# Patient Record
Sex: MALE | Race: WHITE | Employment: OTHER | URBAN - METROPOLITAN AREA
[De-identification: names, ages, dates, MRNs, and addresses within clinical notes are randomized per-mention and may not be internally consistent; named-entity substitution may affect disease eponyms.]

---

## 2022-08-10 ENCOUNTER — TELEPHONE (OUTPATIENT)
Dept: PRIMARY CARE CLINIC | Age: 62
End: 2022-08-10

## 2022-08-10 ENCOUNTER — OFFICE VISIT (OUTPATIENT)
Dept: PRIMARY CARE CLINIC | Age: 62
End: 2022-08-10
Payer: MEDICAID

## 2022-08-10 VITALS
DIASTOLIC BLOOD PRESSURE: 74 MMHG | OXYGEN SATURATION: 98 % | SYSTOLIC BLOOD PRESSURE: 144 MMHG | HEIGHT: 72 IN | TEMPERATURE: 98.1 F | WEIGHT: 205.2 LBS | BODY MASS INDEX: 27.79 KG/M2 | HEART RATE: 73 BPM

## 2022-08-10 DIAGNOSIS — G47.33 OBSTRUCTIVE SLEEP APNEA SYNDROME: ICD-10-CM

## 2022-08-10 DIAGNOSIS — Z86.73: ICD-10-CM

## 2022-08-10 DIAGNOSIS — Z12.5 PROSTATE CANCER SCREENING: ICD-10-CM

## 2022-08-10 DIAGNOSIS — I10 PRIMARY HYPERTENSION: ICD-10-CM

## 2022-08-10 DIAGNOSIS — E11.65 TYPE 2 DIABETES MELLITUS WITH HYPERGLYCEMIA, WITHOUT LONG-TERM CURRENT USE OF INSULIN (HCC): ICD-10-CM

## 2022-08-10 DIAGNOSIS — Z79.01 CHRONIC ANTICOAGULATION: ICD-10-CM

## 2022-08-10 DIAGNOSIS — I63.541 CEREBROVASCULAR ACCIDENT (CVA) DUE TO OCCLUSION OF RIGHT CEREBELLAR ARTERY (HCC): ICD-10-CM

## 2022-08-10 DIAGNOSIS — Z72.0 TOBACCO ABUSE: ICD-10-CM

## 2022-08-10 DIAGNOSIS — I10 PRIMARY HYPERTENSION: Primary | ICD-10-CM

## 2022-08-10 DIAGNOSIS — R09.89 BRUIT OF RIGHT CAROTID ARTERY: ICD-10-CM

## 2022-08-10 PROBLEM — E11.9 TYPE 2 DIABETES MELLITUS (HCC): Status: ACTIVE | Noted: 2022-08-10

## 2022-08-10 LAB
A/G RATIO: 1.7 (ref 1.1–2.2)
ALBUMIN SERPL-MCNC: 5 G/DL (ref 3.4–5)
ALP BLD-CCNC: 61 U/L (ref 40–129)
ALT SERPL-CCNC: 60 U/L (ref 10–40)
ANION GAP SERPL CALCULATED.3IONS-SCNC: 15 MMOL/L (ref 3–16)
AST SERPL-CCNC: 32 U/L (ref 15–37)
BASOPHILS ABSOLUTE: 0 K/UL (ref 0–0.2)
BASOPHILS RELATIVE PERCENT: 0.8 %
BILIRUB SERPL-MCNC: 0.4 MG/DL (ref 0–1)
BUN BLDV-MCNC: 26 MG/DL (ref 7–20)
CALCIUM SERPL-MCNC: 10.2 MG/DL (ref 8.3–10.6)
CHLORIDE BLD-SCNC: 100 MMOL/L (ref 99–110)
CO2: 25 MMOL/L (ref 21–32)
CREAT SERPL-MCNC: 1.3 MG/DL (ref 0.8–1.3)
CREATININE URINE: 193.3 MG/DL (ref 39–259)
EOSINOPHILS ABSOLUTE: 0.2 K/UL (ref 0–0.6)
EOSINOPHILS RELATIVE PERCENT: 2.6 %
GFR AFRICAN AMERICAN: >60
GFR NON-AFRICAN AMERICAN: 56
GLUCOSE BLD-MCNC: 119 MG/DL (ref 70–99)
HCT VFR BLD CALC: 40.6 % (ref 40.5–52.5)
HEMOGLOBIN: 13.6 G/DL (ref 13.5–17.5)
LYMPHOCYTES ABSOLUTE: 1.6 K/UL (ref 1–5.1)
LYMPHOCYTES RELATIVE PERCENT: 25.7 %
MCH RBC QN AUTO: 30.2 PG (ref 26–34)
MCHC RBC AUTO-ENTMCNC: 33.4 G/DL (ref 31–36)
MCV RBC AUTO: 90.4 FL (ref 80–100)
MICROALBUMIN UR-MCNC: 1.5 MG/DL
MICROALBUMIN/CREAT UR-RTO: 7.8 MG/G (ref 0–30)
MONOCYTES ABSOLUTE: 0.6 K/UL (ref 0–1.3)
MONOCYTES RELATIVE PERCENT: 9.4 %
NEUTROPHILS ABSOLUTE: 3.9 K/UL (ref 1.7–7.7)
NEUTROPHILS RELATIVE PERCENT: 61.5 %
PDW BLD-RTO: 13.1 % (ref 12.4–15.4)
PLATELET # BLD: 202 K/UL (ref 135–450)
PMV BLD AUTO: 9.3 FL (ref 5–10.5)
POTASSIUM SERPL-SCNC: 4.9 MMOL/L (ref 3.5–5.1)
PROSTATE SPECIFIC ANTIGEN: 0.57 NG/ML (ref 0–4)
RBC # BLD: 4.49 M/UL (ref 4.2–5.9)
SODIUM BLD-SCNC: 140 MMOL/L (ref 136–145)
TOTAL PROTEIN: 7.9 G/DL (ref 6.4–8.2)
WBC # BLD: 6.4 K/UL (ref 4–11)

## 2022-08-10 PROCEDURE — G8427 DOCREV CUR MEDS BY ELIG CLIN: HCPCS | Performed by: FAMILY MEDICINE

## 2022-08-10 PROCEDURE — 2022F DILAT RTA XM EVC RTNOPTHY: CPT | Performed by: FAMILY MEDICINE

## 2022-08-10 PROCEDURE — 4004F PT TOBACCO SCREEN RCVD TLK: CPT | Performed by: FAMILY MEDICINE

## 2022-08-10 PROCEDURE — G8419 CALC BMI OUT NRM PARAM NOF/U: HCPCS | Performed by: FAMILY MEDICINE

## 2022-08-10 PROCEDURE — 99203 OFFICE O/P NEW LOW 30 MIN: CPT | Performed by: FAMILY MEDICINE

## 2022-08-10 PROCEDURE — 3017F COLORECTAL CA SCREEN DOC REV: CPT | Performed by: FAMILY MEDICINE

## 2022-08-10 PROCEDURE — 3051F HG A1C>EQUAL 7.0%<8.0%: CPT | Performed by: FAMILY MEDICINE

## 2022-08-10 RX ORDER — ATORVASTATIN CALCIUM 80 MG/1
TABLET, FILM COATED ORAL
COMMUNITY
Start: 2022-08-05 | End: 2022-09-27 | Stop reason: SDUPTHER

## 2022-08-10 RX ORDER — TRAZODONE HYDROCHLORIDE 100 MG/1
TABLET ORAL
COMMUNITY
Start: 2022-08-05 | End: 2022-09-27 | Stop reason: SDUPTHER

## 2022-08-10 RX ORDER — NIFEDIPINE 30 MG/1
TABLET, EXTENDED RELEASE ORAL
COMMUNITY
Start: 2022-08-05 | End: 2022-09-27 | Stop reason: SDUPTHER

## 2022-08-10 RX ORDER — GLIPIZIDE 5 MG/1
5 TABLET ORAL
COMMUNITY
Start: 2022-08-05 | End: 2022-09-27 | Stop reason: SDUPTHER

## 2022-08-10 RX ORDER — LISINOPRIL 40 MG/1
40 TABLET ORAL DAILY
COMMUNITY
End: 2022-09-27 | Stop reason: SDUPTHER

## 2022-08-10 RX ORDER — LANOLIN ALCOHOL/MO/W.PET/CERES
3 CREAM (GRAM) TOPICAL DAILY
COMMUNITY

## 2022-08-10 RX ORDER — CLOPIDOGREL BISULFATE 75 MG/1
75 TABLET ORAL DAILY
COMMUNITY
Start: 2022-08-05 | End: 2022-09-27 | Stop reason: SDUPTHER

## 2022-08-10 RX ORDER — NICOTINE 21 MG/24HR
1 PATCH, TRANSDERMAL 24 HOURS TRANSDERMAL EVERY 24 HOURS
COMMUNITY

## 2022-08-10 SDOH — ECONOMIC STABILITY: FOOD INSECURITY: WITHIN THE PAST 12 MONTHS, YOU WORRIED THAT YOUR FOOD WOULD RUN OUT BEFORE YOU GOT MONEY TO BUY MORE.: NEVER TRUE

## 2022-08-10 SDOH — ECONOMIC STABILITY: FOOD INSECURITY: WITHIN THE PAST 12 MONTHS, THE FOOD YOU BOUGHT JUST DIDN'T LAST AND YOU DIDN'T HAVE MONEY TO GET MORE.: NEVER TRUE

## 2022-08-10 ASSESSMENT — PATIENT HEALTH QUESTIONNAIRE - PHQ9
1. LITTLE INTEREST OR PLEASURE IN DOING THINGS: 0
SUM OF ALL RESPONSES TO PHQ QUESTIONS 1-9: 0
SUM OF ALL RESPONSES TO PHQ9 QUESTIONS 1 & 2: 0
2. FEELING DOWN, DEPRESSED OR HOPELESS: 0

## 2022-08-10 ASSESSMENT — SOCIAL DETERMINANTS OF HEALTH (SDOH): HOW HARD IS IT FOR YOU TO PAY FOR THE VERY BASICS LIKE FOOD, HOUSING, MEDICAL CARE, AND HEATING?: NOT HARD AT ALL

## 2022-08-10 ASSESSMENT — ENCOUNTER SYMPTOMS
SHORTNESS OF BREATH: 0
ABDOMINAL PAIN: 0
EYES NEGATIVE: 1

## 2022-08-10 NOTE — PROGRESS NOTES
SUBJECTIVE:  Patient ID: Capo Will is a 57 y.o. male. Chief Complaint:  Chief Complaint   Patient presents with    Establish Care     Stroke two weeks ago       HPI  57year old Male  In with wife   New Patient visit  Seen by Dr Freddy Barrios in past  Recent Stroke  @ [facility] 7/25/2022   Then Rehab center    Patient Active Problem List   Diagnosis    HTN (hypertension)    Allergic rhinitis    Chronic back pain     Current Outpatient Medications   Medication Sig Dispense Refill    clopidogrel (PLAVIX) 75 MG tablet       glipiZIDE (GLUCOTROL) 5 MG tablet       NIFEdipine (PROCARDIA XL) 30 MG extended release tablet       traZODone (DESYREL) 100 MG tablet       aspirin EC 81 MG EC tablet Take 1 tablet by mouth daily. 30 tablet 3    lisinopril-hydrochlorothiazide (PRINZIDE;ZESTORETIC) 20-25 MG per tablet TAKE ONE TABLET BY MOUTH TWICE DAILY (Patient not taking: Reported on 8/10/2022) 60 tablet 0    cloNIDine (CATAPRES) 0.2 MG tablet TAKE ONE TABLET BY MOUTH TWICE DAILY (Patient not taking: Reported on 8/10/2022) 60 tablet 0    furosemide (LASIX) 40 MG tablet Take 40 mg by mouth 2 times daily. (Patient not taking: Reported on 8/10/2022)       No current facility-administered medications for this visit. No results found for: WBC, HGB, HCT, MCV, PLT  No results found for: CHOL  No results found for: TRIG  No results found for: HDL  No results found for: LDLCHOLESTEROL, LDLCALC  No results found for: LABVLDL, VLDL  No results found for: CHOLHDLRATIO    Chemistry    No results found for: NA, K, CL, CO2, BUN, CREATININE, GLU No results found for: CALCIUM, ALKPHOS, AST, ALT, BILITOT         Review of Systems   Constitutional:  Negative for fever. HENT: Negative. Eyes: Negative. Respiratory:  Negative for shortness of breath. Tobacco abuse   Cardiovascular:  Negative for leg swelling.         Cardiology Evaluation @ [facility]  Carotid bruit Rt  Vascular surgeon is pending   Gastrointestinal:  Negative for abdominal pain. Endocrine: Negative. NIDDM   Genitourinary:  Negative for enuresis and frequency. Neurological:  Positive for facial asymmetry and weakness. Recent Stroke 7/25/22  Few CT head  Few MRI  Weakness Left side face & upper arm & hands   Psychiatric/Behavioral:          Working things with wife   Lauren Separation in past  One son 25year old     OBJECTIVE:  BP (!) 144/74 (Site: Right Upper Arm, Position: Sitting, Cuff Size: Medium Adult)   Pulse 73   Temp 98.1 °F (36.7 °C) (Infrared)   Ht 5' 11.5\" (1.816 m)   Wt 205 lb 3.2 oz (93.1 kg)   SpO2 98%   BMI 28.22 kg/m²   Physical Exam  Constitutional:       Comments: In with wife   Kirti TORRES:      Head: Normocephalic. Eyes:      Extraocular Movements: Extraocular movements intact. Pupils: Pupils are equal, round, and reactive to light. Neck:      Vascular: Carotid bruit present. Comments: Carotid bruit Rt  Cardiovascular:      Rate and Rhythm: Normal rate and regular rhythm. Pulses: Normal pulses. Heart sounds: Normal heart sounds. Pulmonary:      Breath sounds: Rhonchi present. No wheezing. Comments: Wearing Patch left arm  Musculoskeletal:      Cervical back: Normal range of motion and neck supple. Neurological:      Mental Status: He is alert and oriented to person, place, and time. Comments: Flat face Left   Weakness Upper extremity & hand Left       ASSESSMENT/PLAN:      Diagnosis Orders   1. Hospital discharge follow-up        2. Obstructive sleep apnea syndrome  Lisette Carmen MD, Sleep Medicine, [facility]      3. Primary hypertension  CBC with Auto Differential    Comprehensive Metabolic Panel      4. Type 2 diabetes mellitus with hyperglycemia, without long-term current use of insulin (HCC)  CBC with Auto Differential    Comprehensive Metabolic Panel    Hemoglobin A1C    Microalbumin / Creatinine Urine Ratio      5. Prostate cancer screening  PSA Screening      6.  History of ischemic anterior cerebral artery stroke        7. Tobacco abuse        8. Bruit of right carotid artery        9. Cerebrovascular accident (CVA) due to occlusion of right cerebellar artery ([address]        10.  Chronic anticoagulation            PMH,PSH,Allergy,FHx,Social Hx documentation  MRI Head done 7/27/2022   ECHO  done  7/26/2022  Carotid bruit  RT side  Laec with surgeon Misha [facility]   S/P Stroke full evaluation @[facility]  Hypertension continue current Rx  NIDDM continue current Rx  Tobacco abuse + Patch   Visit 3 month

## 2022-08-11 LAB
ESTIMATED AVERAGE GLUCOSE: 165.7 MG/DL
HBA1C MFR BLD: 7.4 %

## 2022-08-12 DIAGNOSIS — R80.9 TYPE 2 DIABETES MELLITUS WITH MICROALBUMINURIA, WITH LONG-TERM CURRENT USE OF INSULIN (HCC): ICD-10-CM

## 2022-08-12 DIAGNOSIS — Z79.4 TYPE 2 DIABETES MELLITUS WITH MICROALBUMINURIA, WITH LONG-TERM CURRENT USE OF INSULIN (HCC): ICD-10-CM

## 2022-08-12 DIAGNOSIS — N28.9 LOW KIDNEY FUNCTION: Primary | ICD-10-CM

## 2022-08-12 DIAGNOSIS — E11.29 TYPE 2 DIABETES MELLITUS WITH MICROALBUMINURIA, WITH LONG-TERM CURRENT USE OF INSULIN (HCC): ICD-10-CM

## 2022-08-18 ENCOUNTER — TELEPHONE (OUTPATIENT)
Dept: PRIMARY CARE CLINIC | Age: 62
End: 2022-08-18

## 2022-08-18 NOTE — TELEPHONE ENCOUNTER
Can he come in for an appointment to review medications, BP, and sugars tomorrow? Please track AM fasting sugar and 2 hour post prandial sugars. I would like to review daily log of BP's as well.

## 2022-08-18 NOTE — TELEPHONE ENCOUNTER
On two different bp meds, took both today bg's been elevated. Just was hospitalized for stroke. Home care wants to know if medication should be changed.

## 2022-08-18 NOTE — TELEPHONE ENCOUNTER
Vickie Kaiser from [address] [place] [address] called and stated the pt resting blood pressure of 167/93. Random blood sugar of 156.  Pls call Vickie Kaiser back at [phone] to discuss

## 2022-08-18 NOTE — TELEPHONE ENCOUNTER
Tiffanie Ordonez [facility]) advised. She is currently with the patient. Advised to have him call us with readings on Monday.

## 2022-08-23 ENCOUNTER — TELEPHONE (OUTPATIENT)
Dept: PRIMARY CARE CLINIC | Age: 62
End: 2022-08-23

## 2022-08-23 NOTE — TELEPHONE ENCOUNTER
Pt wife, Josh Castro called and wanted to give  this info. .. BP Friday /79  BP Friday 7:40PM 115/71  BP Sat- 10:15AM 127/70  BP Sat 1:00PM 126/69  BP Sun 10AM 134/78  BP Sun 8:45PM 129/82  BP mon 1pM 148/87  Mon 7PM 105/60  Tues 9 /68    Pls call pt wife back if needed.

## 2022-08-24 ENCOUNTER — TELEPHONE (OUTPATIENT)
Dept: PRIMARY CARE CLINIC | Age: 62
End: 2022-08-24

## 2022-08-24 ENCOUNTER — HOSPITAL ENCOUNTER (OUTPATIENT)
Dept: ULTRASOUND IMAGING | Age: 62
Discharge: HOME | End: 2022-08-24
Payer: MEDICAID

## 2022-08-24 DIAGNOSIS — N18.31 STAGE 3A CHRONIC KIDNEY DISEASE (HCC): ICD-10-CM

## 2022-08-24 DIAGNOSIS — I69.354 HEMIPLGA FOLLOWING CEREBRAL INFRC AFFECTING LEFT NONDOM SIDE (HCC): Primary | ICD-10-CM

## 2022-08-24 PROCEDURE — 76770 US EXAM ABDO BACK WALL COMP: CPT

## 2022-09-26 NOTE — TELEPHONE ENCOUNTER
Since Dr Darshana Obregon has never prescribed  these meds for him before, we need to verify doses and frequency. Please call patient and review med refill requests.

## 2022-09-26 NOTE — TELEPHONE ENCOUNTER
Medication:   Requested Prescriptions     Pending Prescriptions Disp Refills    lisinopril (PRINIVIL;ZESTRIL) 40 MG tablet 30 tablet      Sig: Take 1 tablet by mouth daily    clopidogrel (PLAVIX) 75 MG tablet 30 tablet      Sig: Take 1 tablet by mouth daily    glipiZIDE (GLUCOTROL) 5 MG tablet 60 tablet      Sig: Take 1 tablet by mouth 2 times daily (before meals)    NIFEdipine (PROCARDIA XL) 30 MG extended release tablet 30 tablet     traZODone (DESYREL) 100 MG tablet      atorvastatin (LIPITOR) 80 MG tablet 30 tablet           Last appt: 8/10/2022   Next appt: Visit date not found    Last Labs DM:   Lab Results   Component Value Date/Time    LABA1C 7.4 08/10/2022 03:31 PM     Last Lipid: No results found for: CHOL, TRIG, HDL, LDLCALC  Last PSA:   Lab Results   Component Value Date/Time    PSA 0.57 08/10/2022 03:31 PM     Last Thyroid: No results found for: TSH, FT3, A7YVWVB, T4FREE, P6VLFBH

## 2022-09-26 NOTE — TELEPHONE ENCOUNTER
Pt needs meds refills. LOV 8/10/22 Pt needs today.        lisinopril (PRINIVIL;ZESTRIL) 40 MG tablet  clopidogrel (PLAVIX) 75 MG tablet  glipiZIDE (GLUCOTROL) 5 MG tablet  NIFEdipine (PROCARDIA XL) 30 MG extended release tablet  traZODone (DESYREL) 100 MG tablet  atorvastatin (LIPITOR) 80 MG tablet  [id number] [facility],[address] [place] [address] [phone]   [address] [place] [address]   Phone:  [phone]  Fax:  [phone]

## 2022-09-27 RX ORDER — CLOPIDOGREL BISULFATE 75 MG/1
75 TABLET ORAL DAILY
Qty: 30 TABLET | Refills: 2 | Status: SHIPPED | OUTPATIENT
Start: 2022-09-27

## 2022-09-27 RX ORDER — ATORVASTATIN CALCIUM 80 MG/1
TABLET, FILM COATED ORAL
Qty: 30 TABLET | OUTPATIENT
Start: 2022-09-27

## 2022-09-27 RX ORDER — GLIPIZIDE 5 MG/1
5 TABLET ORAL
Qty: 60 TABLET | Refills: 2 | OUTPATIENT
Start: 2022-09-27

## 2022-09-27 RX ORDER — GLIPIZIDE 5 MG/1
5 TABLET ORAL
Qty: 60 TABLET | Refills: 2 | Status: SHIPPED | OUTPATIENT
Start: 2022-09-27

## 2022-09-27 RX ORDER — ATORVASTATIN CALCIUM 80 MG/1
80 TABLET, FILM COATED ORAL DAILY
Qty: 30 TABLET | Refills: 2 | Status: SHIPPED | OUTPATIENT
Start: 2022-09-27

## 2022-09-27 RX ORDER — NIFEDIPINE 30 MG/1
TABLET, EXTENDED RELEASE ORAL
Qty: 30 TABLET | OUTPATIENT
Start: 2022-09-27

## 2022-09-27 RX ORDER — LISINOPRIL 40 MG/1
40 TABLET ORAL DAILY
Qty: 30 TABLET | Refills: 2 | OUTPATIENT
Start: 2022-09-27

## 2022-09-27 RX ORDER — NIFEDIPINE 30 MG/1
30 TABLET, EXTENDED RELEASE ORAL DAILY
Qty: 30 TABLET | Refills: 2 | Status: SHIPPED | OUTPATIENT
Start: 2022-09-27

## 2022-09-27 RX ORDER — CLOPIDOGREL BISULFATE 75 MG/1
75 TABLET ORAL DAILY
Qty: 30 TABLET | Refills: 2 | OUTPATIENT
Start: 2022-09-27

## 2022-09-27 RX ORDER — TRAZODONE HYDROCHLORIDE 100 MG/1
TABLET ORAL
OUTPATIENT
Start: 2022-09-27

## 2022-09-27 RX ORDER — TRAZODONE HYDROCHLORIDE 100 MG/1
100 TABLET ORAL NIGHTLY
Qty: 30 TABLET | Refills: 2 | Status: SHIPPED | OUTPATIENT
Start: 2022-09-27

## 2022-09-27 RX ORDER — LISINOPRIL 40 MG/1
40 TABLET ORAL DAILY
Qty: 30 TABLET | Refills: 2 | Status: SHIPPED | OUTPATIENT
Start: 2022-09-27

## 2022-10-26 ENCOUNTER — OFFICE VISIT (OUTPATIENT)
Dept: PRIMARY CARE CLINIC | Age: 62
End: 2022-10-26
Payer: MEDICAID

## 2022-10-26 VITALS
HEIGHT: 72 IN | TEMPERATURE: 97.9 F | HEART RATE: 80 BPM | WEIGHT: 206.6 LBS | RESPIRATION RATE: 20 BRPM | SYSTOLIC BLOOD PRESSURE: 136 MMHG | DIASTOLIC BLOOD PRESSURE: 84 MMHG | OXYGEN SATURATION: 98 % | BODY MASS INDEX: 27.98 KG/M2

## 2022-10-26 DIAGNOSIS — E78.9 LIPID DISORDER: ICD-10-CM

## 2022-10-26 DIAGNOSIS — R09.81 NASAL CONGESTION: ICD-10-CM

## 2022-10-26 DIAGNOSIS — Z86.73 S/P STROKE DUE TO CEREBROVASCULAR DISEASE: ICD-10-CM

## 2022-10-26 DIAGNOSIS — M54.2 NECK PAIN: Primary | ICD-10-CM

## 2022-10-26 DIAGNOSIS — Z79.01 CHRONIC ANTICOAGULATION: ICD-10-CM

## 2022-10-26 DIAGNOSIS — I10 PRIMARY HYPERTENSION: ICD-10-CM

## 2022-10-26 DIAGNOSIS — E11.65 TYPE 2 DIABETES MELLITUS WITH HYPERGLYCEMIA, WITHOUT LONG-TERM CURRENT USE OF INSULIN (HCC): ICD-10-CM

## 2022-10-26 PROCEDURE — G8419 CALC BMI OUT NRM PARAM NOF/U: HCPCS | Performed by: FAMILY MEDICINE

## 2022-10-26 PROCEDURE — 3078F DIAST BP <80 MM HG: CPT | Performed by: FAMILY MEDICINE

## 2022-10-26 PROCEDURE — 2022F DILAT RTA XM EVC RTNOPTHY: CPT | Performed by: FAMILY MEDICINE

## 2022-10-26 PROCEDURE — 3017F COLORECTAL CA SCREEN DOC REV: CPT | Performed by: FAMILY MEDICINE

## 2022-10-26 PROCEDURE — 99214 OFFICE O/P EST MOD 30 MIN: CPT | Performed by: FAMILY MEDICINE

## 2022-10-26 PROCEDURE — 4004F PT TOBACCO SCREEN RCVD TLK: CPT | Performed by: FAMILY MEDICINE

## 2022-10-26 PROCEDURE — G8427 DOCREV CUR MEDS BY ELIG CLIN: HCPCS | Performed by: FAMILY MEDICINE

## 2022-10-26 PROCEDURE — 3074F SYST BP LT 130 MM HG: CPT | Performed by: FAMILY MEDICINE

## 2022-10-26 PROCEDURE — G8484 FLU IMMUNIZE NO ADMIN: HCPCS | Performed by: FAMILY MEDICINE

## 2022-10-26 PROCEDURE — 3051F HG A1C>EQUAL 7.0%<8.0%: CPT | Performed by: FAMILY MEDICINE

## 2022-10-26 NOTE — PROGRESS NOTES
SUBJECTIVE:  Patient ID: Johanne Kehr is a 57 y.o. male. Chief Complaint:  Chief Complaint   Patient presents with    Neck Pain     X 3-4 months     Shoulder Pain     X 3-4 months        HPI  57year old Male  In with wife   Neck pain x 1-2 year   Pain got worse post stoke July 2022 +Physical Therapy   Stroke affected left some limitation ROM  Stroke July 25,2022  Weakness left shoulder   Retired 2021 @age 57   Vascular surgeon evaluation for Carotis disease  Patient Active Problem List   Diagnosis    HTN (hypertension)    Allergic rhinitis    Chronic back pain    Cerebrovascular accident (CVA) due to occlusion of right cerebellar artery (HCC)    Type 2 diabetes mellitus (HCC)    Chronic anticoagulation     Current Outpatient Medications   Medication Sig Dispense Refill    clopidogrel (PLAVIX) 75 MG tablet Take 1 tablet by mouth daily 30 tablet 2    glipiZIDE (GLUCOTROL) 5 MG tablet Take 1 tablet by mouth 2 times daily (before meals) 60 tablet 2    NIFEdipine (PROCARDIA XL) 30 MG extended release tablet Take 1 tablet by mouth daily 30 tablet 2    traZODone (DESYREL) 100 MG tablet Take 1 tablet by mouth nightly 30 tablet 2    lisinopril (PRINIVIL;ZESTRIL) 40 MG tablet Take 1 tablet by mouth daily 30 tablet 2    atorvastatin (LIPITOR) 80 MG tablet Take 1 tablet by mouth daily 30 tablet 2    NONFORMULARY TOTAL BEETS (blood pressure)      melatonin 3 MG TABS tablet Take 3 mg by mouth daily 2 tablet nightly 6mgh      nicotine (NICODERM CQ) 21 MG/24HR Place 1 patch onto the skin every 24 hours      aspirin EC 81 MG EC tablet Take 1 tablet by mouth daily. 30 tablet 3     No current facility-administered medications for this visit.      Lab Results   Component Value Date    WBC 6.4 08/10/2022    HGB 13.6 08/10/2022    HCT 40.6 08/10/2022    MCV 90.4 08/10/2022     08/10/2022     No results found for: CHOL  No results found for: TRIG  No results found for: HDL  No results found for: LDLCHOLESTEROL, LDLCALC  No results found for: LABVLDL, VLDL  No results found for: [facility]    Chemistry        Component Value Date/Time     08/10/2022 1531    K 4.9 08/10/2022 1531     08/10/2022 1531    CO2 25 08/10/2022 1531    BUN 26 (H) 08/10/2022 1531    CREATININE 1.3 08/10/2022 1531        Component Value Date/Time    CALCIUM 10.2 08/10/2022 1531    ALKPHOS 61 08/10/2022 1531    AST 32 08/10/2022 1531    ALT 60 (H) 08/10/2022 1531    BILITOT 0.4 08/10/2022 1531        Hemoglobin A1C   Date Value Ref Range Status   08/10/2022 7.4 See comment % Final     Comment:     Comment:  Diagnosis of Diabetes: > or = 6.5%  Increased risk of diabetes (Prediabetes): 5.7-6.4%  Glycemic Control: Nonpregnant Adults: <7.0%                    Pregnant: <6.0%         Lab Results   Component Value Date    PSA 0.57 08/10/2022         Review of Systems   Constitutional:         In with wife   HENT: Negative. Eyes:         Advice due eye exam   Respiratory:  Negative for apnea, shortness of breath and wheezing. Dip x 30 year  Tobacco use in past    Cardiovascular:  Negative for chest pain, palpitations and leg swelling. Hx Rt Carotid angiogram  Hx cerebral Angiogram  Hx Duplex study  Hx CTA neck   Gastrointestinal:  Negative for abdominal pain. Endocrine:        DM  Rx Glucotrol    Genitourinary:  Negative for dysuria and flank pain. Musculoskeletal:  Positive for neck pain. Neck & left shoulder pain  Left UE + weakness x Stroke July 2022   Neurological:  Positive for weakness. Negative for dizziness, light-headedness and headaches. Weakness Left UE  Stroke July 2022   Psychiatric/Behavioral: Negative. Negative for behavioral problems, dysphoric mood, hallucinations, self-injury, sleep disturbance and suicidal ideas. The patient is not nervous/anxious and is not hyperactive.          Currently with wife again  Get along      OBJECTIVE:  /84 (Site: Right Upper Arm, Position: Sitting, Cuff Size: Medium Adult)   Pulse 80   Temp 97.9 °F (36.6 °C) (Infrared)   Resp 20   Ht 5' 11.5\" (1.816 m)   Wt 206 lb 9.6 oz (93.7 kg)   SpO2 98%   BMI 28.41 kg/m²   Physical Exam  HENT:      Head: Normocephalic. Eyes:      Extraocular Movements: Extraocular movements intact. Pupils: Pupils are equal, round, and reactive to light. Cardiovascular:      Rate and Rhythm: Normal rate and regular rhythm. Pulses: Normal pulses. Heart sounds: Normal heart sounds. Pulmonary:      Effort: Pulmonary effort is normal.      Breath sounds: Normal breath sounds. No wheezing or rhonchi. Musculoskeletal:      Cervical back: Neck supple. Comments: Left UE weakness   Neurological:      Mental Status: He is alert and oriented to person, place, and time. Psychiatric:         Behavior: Behavior normal.         Thought Content: Thought content normal.         Judgment: Judgment normal.       ASSESSMENT/PLAN:      Diagnosis Orders   1. Neck pain  MRI CERVICAL SPINE WO CONTRAST      2. Nasal congestion  Venus Gutierrez DO, Otolaryngology, [facility]      3. Primary hypertension        4. Type 2 diabetes mellitus with hyperglycemia, without long-term current use of insulin (HCC)        5. Lipid disorder        6.  S/P stroke due to cerebrovascular disease              Hx Acute Ischemic stroke  seen by Vascular surgeon Kaitlyn Bauer   NIDDM advice get eye exam for update  Hypertension BP adequate reading continue current Rx   Lipid disorder Continue current Rx   Chronic anticoagulation  Continue Rx Plavix  Neck pain get MRI

## 2022-10-28 ASSESSMENT — ENCOUNTER SYMPTOMS
ABDOMINAL PAIN: 0
SHORTNESS OF BREATH: 0
WHEEZING: 0
APNEA: 0

## 2022-11-10 ENCOUNTER — OFFICE VISIT (OUTPATIENT)
Dept: ENT CLINIC | Age: 62
End: 2022-11-10
Payer: MEDICAID

## 2022-11-10 ENCOUNTER — HOSPITAL ENCOUNTER (OUTPATIENT)
Dept: MRI IMAGING | Age: 62
Discharge: HOME | End: 2022-11-10
Payer: MEDICAID

## 2022-11-10 VITALS
BODY MASS INDEX: 27.9 KG/M2 | HEIGHT: 72 IN | HEART RATE: 67 BPM | WEIGHT: 206 LBS | SYSTOLIC BLOOD PRESSURE: 146 MMHG | DIASTOLIC BLOOD PRESSURE: 86 MMHG

## 2022-11-10 DIAGNOSIS — K11.7 SIALORRHEA: Primary | ICD-10-CM

## 2022-11-10 DIAGNOSIS — J39.2 DRY THROAT: ICD-10-CM

## 2022-11-10 DIAGNOSIS — Z87.891 HISTORY OF SMOKELESS TOBACCO USE: ICD-10-CM

## 2022-11-10 DIAGNOSIS — M54.2 NECK PAIN: ICD-10-CM

## 2022-11-10 DIAGNOSIS — I63.541 CEREBROVASCULAR ACCIDENT (CVA) DUE TO OCCLUSION OF RIGHT CEREBELLAR ARTERY (HCC): ICD-10-CM

## 2022-11-10 DIAGNOSIS — R13.13 PHARYNGEAL DYSPHAGIA: ICD-10-CM

## 2022-11-10 DIAGNOSIS — J30.0 VASOMOTOR RHINITIS: ICD-10-CM

## 2022-11-10 PROCEDURE — G8419 CALC BMI OUT NRM PARAM NOF/U: HCPCS | Performed by: OTOLARYNGOLOGY

## 2022-11-10 PROCEDURE — G8484 FLU IMMUNIZE NO ADMIN: HCPCS | Performed by: OTOLARYNGOLOGY

## 2022-11-10 PROCEDURE — G8428 CUR MEDS NOT DOCUMENT: HCPCS | Performed by: OTOLARYNGOLOGY

## 2022-11-10 PROCEDURE — 72141 MRI NECK SPINE W/O DYE: CPT

## 2022-11-10 PROCEDURE — 4004F PT TOBACCO SCREEN RCVD TLK: CPT | Performed by: OTOLARYNGOLOGY

## 2022-11-10 PROCEDURE — 3017F COLORECTAL CA SCREEN DOC REV: CPT | Performed by: OTOLARYNGOLOGY

## 2022-11-10 PROCEDURE — 99204 OFFICE O/P NEW MOD 45 MIN: CPT | Performed by: OTOLARYNGOLOGY

## 2022-11-10 PROCEDURE — 31575 DIAGNOSTIC LARYNGOSCOPY: CPT | Performed by: OTOLARYNGOLOGY

## 2022-11-10 PROCEDURE — 3078F DIAST BP <80 MM HG: CPT | Performed by: OTOLARYNGOLOGY

## 2022-11-10 PROCEDURE — 3074F SYST BP LT 130 MM HG: CPT | Performed by: OTOLARYNGOLOGY

## 2022-11-10 RX ORDER — IPRATROPIUM BROMIDE 21 UG/1
2 SPRAY, METERED NASAL 3 TIMES DAILY PRN
Qty: 30 ML | Refills: 3 | Status: SHIPPED | OUTPATIENT
Start: 2022-11-10

## 2022-11-10 ASSESSMENT — ENCOUNTER SYMPTOMS
FACIAL SWELLING: 0
STRIDOR: 0
BLOOD IN STOOL: 0
SORE THROAT: 0
EYE ITCHING: 0
CONSTIPATION: 0
COUGH: 0
TROUBLE SWALLOWING: 0
VOMITING: 0
EYE DISCHARGE: 0
BACK PAIN: 0
DIARRHEA: 0
WHEEZING: 0
CHOKING: 0
RHINORRHEA: 1
VOICE CHANGE: 0
NAUSEA: 0
SINUS PRESSURE: 0
PHOTOPHOBIA: 0
COLOR CHANGE: 0
SHORTNESS OF BREATH: 0
SINUS PAIN: 0

## 2022-11-10 NOTE — PROGRESS NOTES
[facility]  [address] [address] [facility], [address] [place] [address] Av  P: [phone]  F: [phone]       Patient     Ollie Moralez  1960    ChiefComplaint     Chief Complaint   Patient presents with    New Patient     Patient is here today because he has a saliva build up that causes him to chock, dry mouth, drainage and clears his throat, he had a stroke back in July       History of Present Illness     Ollie Moralez is a pleasant 57 y.o. male who presents as a new consultation for sialorrhea, rhinorrhea, dry throat. Patient suffered a right-sided acute ischemic stroke in July. He had a left-sided facial droop, left-sided weakness. Since his stroke, he has been having issues with significant mount of saliva production in the mouth as well as significant rhinorrhea that is worse when he eats. He is also experiencing some dry throat. He was started on trazodone after the stroke. He does admit to some mild dysphagia since the stroke. No odynophagia or change in voice. Seng Sharma He does have a 30-year history of smokeless tobacco use. He recently quit. Denies any sores in the mouth. No significant alcohol use. Denies fevers, chills, night sweats or unexpected weight loss. He has been using a nasal steroid without any significant relief. Past Medical History     Past Medical History:   Diagnosis Date    Stroke (cerebrum) ([facility] [address] 07/25/2022       Past Surgical History     Past Surgical History:   Procedure Laterality Date    CARDIOVASCULAR STRESS TEST  Oct 2012    JHK, negative GXT       Family History     Family History   Problem Relation Age of Onset    Cancer Mother         breast ca.   @age 68    Diabetes Father         @age 76       Social History     Social History     Socioeconomic History    Marital status:      Spouse name: Not on file    Number of children: 1    Years of education: Not on file    Highest education level: Not on file   Occupational History    Occupation: labor   Tobacco Use    Smoking status: Former     Packs/day: 1.00     Years: 15.00     Pack years: 15.00     Types: Cigarettes     Quit date:      Years since quittin.8    Smokeless tobacco: Current     Types: Chew   Vaping Use    Vaping Use: Never used   Substance and Sexual Activity    Alcohol use: No    Drug use: Never    Sexual activity: Not on file   Other Topics Concern    Not on file   Social History Narrative    He was  from wife    SS  since Dec    Covered under insurance of his wife     Dip nicotine 2 can/day Alcohol None     Social Determinants of Health     Financial Resource Strain: Low Risk     Difficulty of Paying Living Expenses: Not hard at all   Food Insecurity: No Food Insecurity    Worried About Running Out of Food in the Last Year: Never true    Ran Out of Food in the Last Year: Never true   Transportation Needs: Not on file   Physical Activity: Not on file   Stress: Not on file   Social Connections: Not on file   Intimate Partner Violence: Not on file   Housing Stability: Not on file       Allergies     No Known Allergies    Medications     Current Outpatient Medications   Medication Sig Dispense Refill    ipratropium (ATROVENT) 0.03 % nasal spray 2 sprays by Each Nostril route 3 times daily as needed for Rhinitis 30 mL 3    clopidogrel (PLAVIX) 75 MG tablet Take 1 tablet by mouth daily 30 tablet 2    glipiZIDE (GLUCOTROL) 5 MG tablet Take 1 tablet by mouth 2 times daily (before meals) 60 tablet 2    NIFEdipine (PROCARDIA XL) 30 MG extended release tablet Take 1 tablet by mouth daily 30 tablet 2    traZODone (DESYREL) 100 MG tablet Take 1 tablet by mouth nightly 30 tablet 2    lisinopril (PRINIVIL;ZESTRIL) 40 MG tablet Take 1 tablet by mouth daily 30 tablet 2    atorvastatin (LIPITOR) 80 MG tablet Take 1 tablet by mouth daily 30 tablet 2    NONFORMULARY TOTAL BEETS (blood pressure)      melatonin 3 MG TABS tablet Take 3 mg by mouth daily 2 tablet nightly 6mgh      nicotine (NICODERM CQ) 21 MG/24HR Place 1 patch onto the skin every 24 hours      aspirin EC 81 MG EC tablet Take 1 tablet by mouth daily. 30 tablet 3     No current facility-administered medications for this visit. Review of Systems     Review of Systems   Constitutional:  Negative for activity change, appetite change, chills, diaphoresis, fatigue, fever and unexpected weight change. HENT:  Positive for rhinorrhea. Negative for congestion, dental problem, drooling, ear discharge, ear pain, facial swelling, hearing loss, mouth sores, nosebleeds, postnasal drip, sinus pressure, sinus pain, sneezing, sore throat, tinnitus, trouble swallowing and voice change. Eyes:  Negative for photophobia, discharge, itching and visual disturbance. Respiratory:  Negative for cough, choking, shortness of breath, wheezing and stridor. Gastrointestinal:  Negative for blood in stool, constipation, diarrhea, nausea and vomiting. Endocrine: Negative for cold intolerance, heat intolerance, polyphagia and polyuria. Musculoskeletal:  Negative for back pain, gait problem, neck pain and neck stiffness. Skin:  Negative for color change, pallor, rash and wound. Neurological:  Negative for dizziness, syncope, facial asymmetry, speech difficulty, light-headedness, numbness and headaches. Hematological:  Negative for adenopathy. Does not bruise/bleed easily. Psychiatric/Behavioral:  Negative for agitation, confusion and sleep disturbance. PhysicalExam     Vitals:    11/10/22 1026   BP: (!) 146/86   Pulse: 67       Physical Exam  Constitutional:       Appearance: He is well-developed. HENT:      Head: Normocephalic and atraumatic. Not macrocephalic and not microcephalic. No raccoon eyes, Morrow's sign, abrasion, contusion, right periorbital erythema, left periorbital erythema or laceration. Hair is normal.      Jaw: No trismus. Right Ear: Hearing, tympanic membrane and external ear normal. No decreased hearing noted. No drainage, swelling or tenderness. No middle ear effusion. No mastoid tenderness. Tympanic membrane is not perforated, retracted or bulging. Tympanic membrane has normal mobility. Left Ear: Hearing, tympanic membrane and external ear normal. No decreased hearing noted. No drainage, swelling or tenderness. No middle ear effusion. No mastoid tenderness. Tympanic membrane is not perforated, retracted or bulging. Tympanic membrane has normal mobility. Nose: Septal deviation present. No nasal deformity, laceration, mucosal edema or rhinorrhea. Right Sinus: No maxillary sinus tenderness or frontal sinus tenderness. Left Sinus: No maxillary sinus tenderness or frontal sinus tenderness. Mouth/Throat:      Mouth: Mucous membranes are not pale, not dry and not cyanotic. No lacerations or oral lesions. Dentition: Normal dentition. No dental caries or dental abscesses. Pharynx: Uvula midline. No oropharyngeal exudate, posterior oropharyngeal erythema or uvula swelling. Tonsils: No tonsillar abscesses. Eyes:      General: Lids are normal.         Right eye: No discharge. Left eye: No discharge. Extraocular Movements:      Right eye: Normal extraocular motion and no nystagmus. Left eye: Normal extraocular motion and no nystagmus. Conjunctiva/sclera:      Right eye: No chemosis or exudate. Left eye: No chemosis or exudate. Neck:      Thyroid: No thyroid mass or thyromegaly. Vascular: Normal carotid pulses. Trachea: No tracheal tenderness or tracheal deviation. Cardiovascular:      Rate and Rhythm: Normal rate and regular rhythm. Pulmonary:      Effort: No tachypnea, bradypnea or respiratory distress. Breath sounds: No stridor. Musculoskeletal:      Right shoulder: Normal range of motion. Cervical back: Neck supple.    Lymphadenopathy:      Head:      Right side of head: No submental, submandibular, tonsillar, preauricular, posterior auricular or occipital adenopathy. Left side of head: No submental, submandibular, tonsillar, preauricular, posterior auricular or occipital adenopathy. Cervical: No cervical adenopathy. Right cervical: No superficial, deep or posterior cervical adenopathy. Left cervical: No superficial, deep or posterior cervical adenopathy. Skin:     General: Skin is warm and dry. Findings: No bruising, erythema, laceration, lesion or rash. Neurological:      Mental Status: He is alert and oriented to person, place, and time. Cranial Nerves: Facial asymmetry (HB II/VI left oral commisure) present. No cranial nerve deficit. Psychiatric:         Speech: Speech normal.         Behavior: Behavior normal.         Procedure     Flexible Laryngoscopy    Pre op: Dry throat, dysphagia, history of smokeless tobacco use. Procedure : Flexible Laryngoscopy  Surgeon: Werner Miranda DO  Anesthesia: Afrin with 4% lidocaine  Indication: Laryngeal mirror examination was not tolerated due to gag reflex  Description:  The scope was passed along the floor of the right naris to the level of the larynx. There was no evidence of concerning masses or lesions of the base of tongue, vallecula, epiglottis, aryepiglottic folds, arytenoids, false vocal folds, true vocal folds, or pyriform sinuses. True vocal folds exhibited symmetric motion bilaterally without evidence of paralysis or paresis. The scope was removed. The patient tolerated the procedure without difficulty. There were no complications. Pertinent findings: Normal exam           Assessment and Plan     1. Sialorrhea      2. Vasomotor rhinitis  I suspect the vasomotor rhinitis and sialorrhea are secondary to his recent stroke. I recommend a trial of Atrovent nasal spray. Proper administration and risks discussed. In regards to the dry throat, this may be secondary to his trazodone prescription.   He may discuss with his neurologist potentially switching the medication to something else. I would like to see how he does with the Atrovent prior to initiating any therapy to decrease sialorrhea as it may be mostly nasal related. Additionally, he may have some dysphagia from the stroke that is causing impaired clearance of mucus secretions in the mouth. I would like to see him back in 3 months. If symptoms persist, may consider fees. - ipratropium (ATROVENT) 0.03 % nasal spray; 2 sprays by Each Nostril route 3 times daily as needed for Rhinitis  Dispense: 30 mL; Refill: 3    3. Dry throat      4. History of smokeless tobacco use      5. Pharyngeal dysphagia      Return in about 3 months (around 2/10/2023). Portions of this note were dictated using Dragon.  There may be linguistic errors secondary to the use of this program.

## 2022-11-11 DIAGNOSIS — M50.90 DISORDER OF INTERVERTEBRAL DISC OF CERVICAL SPINE: Primary | ICD-10-CM

## 2022-11-15 ENCOUNTER — TELEPHONE (OUTPATIENT)
Dept: ORTHOPEDIC SURGERY | Age: 62
End: 2022-11-15

## 2022-11-15 DIAGNOSIS — N18.31 STAGE 3A CHRONIC KIDNEY DISEASE (HCC): ICD-10-CM

## 2022-11-15 LAB
ANION GAP SERPL CALCULATED.3IONS-SCNC: 19 MMOL/L (ref 3–16)
BUN BLDV-MCNC: 23 MG/DL (ref 7–20)
CALCIUM SERPL-MCNC: 10.5 MG/DL (ref 8.3–10.6)
CHLORIDE BLD-SCNC: 100 MMOL/L (ref 99–110)
CO2: 22 MMOL/L (ref 21–32)
CREAT SERPL-MCNC: 1.3 MG/DL (ref 0.8–1.3)
CREATININE URINE: 202.7 MG/DL (ref 39–259)
GFR SERPL CREATININE-BSD FRML MDRD: >60 ML/MIN/{1.73_M2}
GLUCOSE BLD-MCNC: 153 MG/DL (ref 70–99)
MICROALBUMIN UR-MCNC: 3.3 MG/DL
MICROALBUMIN/CREAT UR-RTO: 16.3 MG/G (ref 0–30)
POTASSIUM SERPL-SCNC: 5.1 MMOL/L (ref 3.5–5.1)
SODIUM BLD-SCNC: 141 MMOL/L (ref 136–145)

## 2022-11-15 NOTE — TELEPHONE ENCOUNTER
Attempted to contact patient twice to get an appointment set up with a back and spine specialist. LVM with the back and spine call center for patient to callback at their convenience. [phone].

## 2022-11-21 ENCOUNTER — OFFICE VISIT (OUTPATIENT)
Dept: ORTHOPEDIC SURGERY | Age: 62
End: 2022-11-21
Payer: MEDICAID

## 2022-11-21 DIAGNOSIS — Z86.39 HISTORY OF DIABETES MELLITUS: ICD-10-CM

## 2022-11-21 DIAGNOSIS — M50.30 DDD (DEGENERATIVE DISC DISEASE), CERVICAL: ICD-10-CM

## 2022-11-21 DIAGNOSIS — M48.02 DEGENERATIVE CERVICAL SPINAL STENOSIS: ICD-10-CM

## 2022-11-21 DIAGNOSIS — Z79.02 LONG TERM CURRENT USE OF ANTITHROMBOTICS/ANTIPLATELETS: ICD-10-CM

## 2022-11-21 DIAGNOSIS — M47.812 CERVICAL SPONDYLOSIS: ICD-10-CM

## 2022-11-21 DIAGNOSIS — M54.2 NECK PAIN: ICD-10-CM

## 2022-11-21 PROCEDURE — 4004F PT TOBACCO SCREEN RCVD TLK: CPT | Performed by: INTERNAL MEDICINE

## 2022-11-21 PROCEDURE — G8428 CUR MEDS NOT DOCUMENT: HCPCS | Performed by: INTERNAL MEDICINE

## 2022-11-21 PROCEDURE — G8419 CALC BMI OUT NRM PARAM NOF/U: HCPCS | Performed by: INTERNAL MEDICINE

## 2022-11-21 PROCEDURE — 99204 OFFICE O/P NEW MOD 45 MIN: CPT | Performed by: INTERNAL MEDICINE

## 2022-11-21 PROCEDURE — G8484 FLU IMMUNIZE NO ADMIN: HCPCS | Performed by: INTERNAL MEDICINE

## 2022-11-21 PROCEDURE — 3017F COLORECTAL CA SCREEN DOC REV: CPT | Performed by: INTERNAL MEDICINE

## 2022-11-21 RX ORDER — TIZANIDINE 4 MG/1
4 TABLET ORAL 3 TIMES DAILY PRN
Qty: 30 TABLET | Refills: 1 | Status: SHIPPED | OUTPATIENT
Start: 2022-11-21

## 2022-11-21 NOTE — PROGRESS NOTES
Chief Complaint:   Chief Complaint   Patient presents with    Neck Pain          History of Present Illness:       Patient is a 57 y.o. male presents with the above complaint. The symptoms began 5 monthsago and started without an injury. The patient describes a aching, stiffness pain that does not radiate. The symptoms are constant  and are show no change since the onset. Past medical history significant for CVA and residual mild left hemiparesis-July 2022    The neck pain is location:  Posterior and bilateral  severity: 3 out of 10     The patient has residual left upper extremity and left lower extremity weakness related to CVA, however, the patient has not noted new onset or progressive weakness of the upper extremites. The neck pain : arm pain is 100 : 0     Treatment to date has included none and symptoms have improved with this treatment. The patient denies history of neck trauma. Their is not history or orthopaedic cervical spine surgery. Work up to date has included: MRI which is outlined below. The patient has no prior history of autoimmune disease, inflammatory arthropathy or crystal arthropathy.             Past Medical History:        Past Medical History:   Diagnosis Date    Stroke (cerebrum) ([facility] [address] 07/25/2022         Past Surgical History:   Procedure Laterality Date    CARDIOVASCULAR STRESS TEST  Oct 2012    JHK, negative GXT         Present Medications:         Current Outpatient Medications   Medication Sig Dispense Refill    tiZANidine (ZANAFLEX) 4 MG tablet Take 1 tablet by mouth 3 times daily as needed (Muscle tightness/spasm) 30 tablet 1    ipratropium (ATROVENT) 0.03 % nasal spray 2 sprays by Each Nostril route 3 times daily as needed for Rhinitis 30 mL 3    clopidogrel (PLAVIX) 75 MG tablet Take 1 tablet by mouth daily 30 tablet 2    glipiZIDE (GLUCOTROL) 5 MG tablet Take 1 tablet by mouth 2 times daily (before meals) 60 tablet 2    NIFEdipine (PROCARDIA XL) 30 MG extended release tablet Take 1 tablet by mouth daily 30 tablet 2    traZODone (DESYREL) 100 MG tablet Take 1 tablet by mouth nightly 30 tablet 2    lisinopril (PRINIVIL;ZESTRIL) 40 MG tablet Take 1 tablet by mouth daily 30 tablet 2    atorvastatin (LIPITOR) 80 MG tablet Take 1 tablet by mouth daily 30 tablet 2    NONFORMULARY TOTAL BEETS (blood pressure)      melatonin 3 MG TABS tablet Take 3 mg by mouth daily 2 tablet nightly 6mgh      nicotine (NICODERM CQ) 21 MG/24HR Place 1 patch onto the skin every 24 hours      aspirin EC 81 MG EC tablet Take 1 tablet by mouth daily. 30 tablet 3     No current facility-administered medications for this visit.          Allergies:      No Known Allergies     Social History:         Social History     Socioeconomic History    Marital status:      Spouse name: Not on file    Number of children: 1    Years of education: Not on file    Highest education level: Not on file   Occupational History    Occupation: labor   Tobacco Use    Smoking status: Former     Packs/day: 1.00     Years: 15.00     Pack years: 15.00     Types: Cigarettes     Quit date:      Years since quittin.9    Smokeless tobacco: Current     Types: Chew   Vaping Use    Vaping Use: Never used   Substance and Sexual Activity    Alcohol use: No    Drug use: Never    Sexual activity: Not on file   Other Topics Concern    Not on file   Social History Narrative    He was  from wife    SS  since Dec    Covered under insurance of his wife     Dip nicotine 2 can/day Alcohol None     Social Determinants of Health     Financial Resource Strain: Low Risk     Difficulty of Paying Living Expenses: Not hard at all   Food Insecurity: No Food Insecurity    Worried About Running Out of Food in the Last Year: Never true    Ran Out of Food in the Last Year: Never true   Transportation Needs: Not on file   Physical Activity: Not on file   Stress: Not on file   Social Connections: Not on file   Intimate Partner Violence: Not on file   Housing Stability: Not on file        Review of Symptoms:    Pertinent items are noted in HPI    Review of systems reviewed from Patient History Form dated on today's date and   available in the patient's chart under the Media tab. Vital Signs: There were no vitals filed for this visit. General Exam:     Constitutional: Patient is adequately groomed with no evidence of malnutrition  Mental Status: The patient is oriented to time, place and person. The patient's mood and affect are appropriate. Vascular: Examination reveals no swelling or calf tenderness. Peripheral pulses are palpable and 2+. Lymphatics: no lymphadenopathy of the cervical or axillary regions or upper extremity      Physical Exam: Cervical spine      Primary Exam:    Inspection: No deformity atrophy appreciable curvature      Palpation: No focal trigger point tenderness      Range of Motion: Mild global restriction with subjective stiffness      Strength: Mild asymmetric generalized weakness left upper extremity C5-C8 consistent with mild left hemiparesis      Special Tests: Negative Lopez's      Skin: There are no rashes, ulcerations or lesions. Gait: Nonantalgic      Reflex asymmetric hyperreflexia left upper left biceps consistent with history of right hemispheric CVA     Additional Comments:        Additional Examinations:           Neurologic -Light touch sensation and manual muscle testing is normal C5-C8 . Biceps and triceps reflexes are symmetric and +2.             Office Imaging Results/Procedures PerformedToday:        Office Procedures:     Orders Placed This Encounter   Procedures    [facility]     Referral Priority:   Routine     Referral Type:   Eval and Treat     Referral Reason:   Specialty Services Required     Requested Specialty:   Physical Therapist     Number of Visits Requested:   1           Other Outside Imaging and Testing Personally Reviewed:    EXAM: MRI CERVICAL SPINE WO CONTRAST       INDICATION: Neck pain       COMPARISON: None. TECHNIQUE: Multiplanar, multisequence MR imaging of the cervical spine obtained. IV contrast: None. FINDINGS:       Mild reversal of normal cervical lordosis. Vertebral body heights are normal. Multilevel type I Modic endplate marrow signal changes. C3-4 level: Disc osteophyte complex causing mild-to-moderate thecal sac narrowing. Severe foraminal stenosis. C4-5 level: Disc osteophyte complex causing moderate thecal sac narrowing and flattening of ventral cord. Severe foraminal stenosis. C5-6 level: Disc osteophyte complex causing moderate thecal sac narrowing and flattening of ventral cord. Severe foraminal stenosis. C6-7 level: Disc osteophyte complex causing mild-to-moderate thecal sac narrowing. Severe right and moderate left foraminal stenosis. C7-T1 level: Disc osteophyte complex causing minimal thecal sac narrowing. Mild to moderate left foraminal stenosis. Impression       1. Multilevel degenerative disc disease as above, most pronounced at C4-5 and C5-6 where there are disc osteophyte complex causing moderate thecal sac narrowing and flattening of ventral cord. 3. Multilevel foraminal stenosis as above, most pronounced at bilateral C3-4, C4-5, C5-6, and right C6-7. Assessment   Impression: . Encounter Diagnoses   Name Primary? Cervical spondylosis     DDD (degenerative disc disease), cervical     Degenerative cervical spinal stenosis     History of diabetes mellitus     Long term current use of antithrombotics/antiplatelets     Neck pain               Plan:        Activity modification cervical spine precautions   Cervical spine stabilization home exercise program and formal course of PT and trial of cervical traction  Trial of muscle relaxants as needed  Steroids contraindicated and NSAIDs contraindicated relative to diabetes and

## 2022-11-28 RX ORDER — GLIPIZIDE 5 MG/1
TABLET ORAL
Qty: 60 TABLET | Refills: 2 | Status: SHIPPED | OUTPATIENT
Start: 2022-11-28

## 2022-11-28 NOTE — TELEPHONE ENCOUNTER
Medication:   Requested Prescriptions     Pending Prescriptions Disp Refills    glipiZIDE (GLUCOTROL) 5 MG tablet [Pharmacy Med Name: glipiZIDE 5 MG Oral Tablet] 60 tablet 0     Sig: TAKE 1 TABLET BY MOUTH TWICE DAILY BEFORE MEAL(S)     Last Filled:  9.27.22    Last appt: 10/26/2022   Next appt: Visit date not found    Last Labs DM:   Lab Results   Component Value Date/Time    LABA1C 7.4 08/10/2022 03:31 PM

## 2022-12-07 ENCOUNTER — OFFICE VISIT (OUTPATIENT)
Dept: PULMONOLOGY | Age: 62
End: 2022-12-07
Payer: MEDICAID

## 2022-12-07 ENCOUNTER — OFFICE VISIT (OUTPATIENT)
Dept: PRIMARY CARE CLINIC | Age: 62
End: 2022-12-07
Payer: MEDICAID

## 2022-12-07 VITALS
BODY MASS INDEX: 29.57 KG/M2 | TEMPERATURE: 97.6 F | DIASTOLIC BLOOD PRESSURE: 80 MMHG | WEIGHT: 212 LBS | RESPIRATION RATE: 14 BRPM | SYSTOLIC BLOOD PRESSURE: 139 MMHG | HEART RATE: 84 BPM | OXYGEN SATURATION: 99 %

## 2022-12-07 VITALS
HEART RATE: 84 BPM | WEIGHT: 206 LBS | TEMPERATURE: 97.8 F | HEIGHT: 71 IN | SYSTOLIC BLOOD PRESSURE: 140 MMHG | DIASTOLIC BLOOD PRESSURE: 91 MMHG | BODY MASS INDEX: 28.84 KG/M2 | OXYGEN SATURATION: 98 %

## 2022-12-07 DIAGNOSIS — E11.69 TYPE 2 DIABETES MELLITUS WITH OTHER SPECIFIED COMPLICATION, WITHOUT LONG-TERM CURRENT USE OF INSULIN (HCC): Chronic | ICD-10-CM

## 2022-12-07 DIAGNOSIS — R06.83 SNORING: ICD-10-CM

## 2022-12-07 DIAGNOSIS — Z86.73 S/P STROKE DUE TO CEREBROVASCULAR DISEASE: ICD-10-CM

## 2022-12-07 DIAGNOSIS — Z79.01 CHRONIC ANTICOAGULATION: ICD-10-CM

## 2022-12-07 DIAGNOSIS — E11.69 TYPE 2 DIABETES MELLITUS WITH OTHER SPECIFIED COMPLICATION, WITHOUT LONG-TERM CURRENT USE OF INSULIN (HCC): ICD-10-CM

## 2022-12-07 DIAGNOSIS — I10 PRIMARY HYPERTENSION: Chronic | ICD-10-CM

## 2022-12-07 DIAGNOSIS — M25.9 SHOULDER DISORDER: Primary | ICD-10-CM

## 2022-12-07 DIAGNOSIS — G47.10 HYPERSOMNIA: Primary | ICD-10-CM

## 2022-12-07 DIAGNOSIS — Z12.11 COLON CANCER SCREENING: ICD-10-CM

## 2022-12-07 DIAGNOSIS — I10 PRIMARY HYPERTENSION: ICD-10-CM

## 2022-12-07 DIAGNOSIS — E78.9 LIPID DISORDER: ICD-10-CM

## 2022-12-07 PROBLEM — I63.541 CEREBROVASCULAR ACCIDENT (CVA) DUE TO OCCLUSION OF RIGHT CEREBELLAR ARTERY (HCC): Chronic | Status: ACTIVE | Noted: 2022-07-25

## 2022-12-07 PROBLEM — E11.9 TYPE 2 DIABETES MELLITUS (HCC): Chronic | Status: ACTIVE | Noted: 2022-08-10

## 2022-12-07 LAB
A/G RATIO: 1.5 (ref 1.1–2.2)
ALBUMIN SERPL-MCNC: 4.8 G/DL (ref 3.4–5)
ALP BLD-CCNC: 78 U/L (ref 40–129)
ALT SERPL-CCNC: 35 U/L (ref 10–40)
ANION GAP SERPL CALCULATED.3IONS-SCNC: 15 MMOL/L (ref 3–16)
AST SERPL-CCNC: 18 U/L (ref 15–37)
BILIRUB SERPL-MCNC: 0.3 MG/DL (ref 0–1)
BUN BLDV-MCNC: 20 MG/DL (ref 7–20)
CALCIUM SERPL-MCNC: 10.3 MG/DL (ref 8.3–10.6)
CHLORIDE BLD-SCNC: 100 MMOL/L (ref 99–110)
CO2: 26 MMOL/L (ref 21–32)
CREAT SERPL-MCNC: 1.2 MG/DL (ref 0.8–1.3)
GFR SERPL CREATININE-BSD FRML MDRD: >60 ML/MIN/{1.73_M2}
GLUCOSE BLD-MCNC: 191 MG/DL (ref 70–99)
POTASSIUM SERPL-SCNC: 5.1 MMOL/L (ref 3.5–5.1)
SODIUM BLD-SCNC: 141 MMOL/L (ref 136–145)
TOTAL PROTEIN: 8.1 G/DL (ref 6.4–8.2)

## 2022-12-07 PROCEDURE — 2022F DILAT RTA XM EVC RTNOPTHY: CPT | Performed by: FAMILY MEDICINE

## 2022-12-07 PROCEDURE — G8419 CALC BMI OUT NRM PARAM NOF/U: HCPCS | Performed by: INTERNAL MEDICINE

## 2022-12-07 PROCEDURE — 3078F DIAST BP <80 MM HG: CPT | Performed by: FAMILY MEDICINE

## 2022-12-07 PROCEDURE — 3074F SYST BP LT 130 MM HG: CPT | Performed by: FAMILY MEDICINE

## 2022-12-07 PROCEDURE — 2022F DILAT RTA XM EVC RTNOPTHY: CPT | Performed by: INTERNAL MEDICINE

## 2022-12-07 PROCEDURE — G8427 DOCREV CUR MEDS BY ELIG CLIN: HCPCS | Performed by: FAMILY MEDICINE

## 2022-12-07 PROCEDURE — 4004F PT TOBACCO SCREEN RCVD TLK: CPT | Performed by: FAMILY MEDICINE

## 2022-12-07 PROCEDURE — 3078F DIAST BP <80 MM HG: CPT | Performed by: INTERNAL MEDICINE

## 2022-12-07 PROCEDURE — G8484 FLU IMMUNIZE NO ADMIN: HCPCS | Performed by: INTERNAL MEDICINE

## 2022-12-07 PROCEDURE — 99214 OFFICE O/P EST MOD 30 MIN: CPT | Performed by: FAMILY MEDICINE

## 2022-12-07 PROCEDURE — 3074F SYST BP LT 130 MM HG: CPT | Performed by: INTERNAL MEDICINE

## 2022-12-07 PROCEDURE — 3017F COLORECTAL CA SCREEN DOC REV: CPT | Performed by: FAMILY MEDICINE

## 2022-12-07 PROCEDURE — G8484 FLU IMMUNIZE NO ADMIN: HCPCS | Performed by: FAMILY MEDICINE

## 2022-12-07 PROCEDURE — G8419 CALC BMI OUT NRM PARAM NOF/U: HCPCS | Performed by: FAMILY MEDICINE

## 2022-12-07 PROCEDURE — 99204 OFFICE O/P NEW MOD 45 MIN: CPT | Performed by: INTERNAL MEDICINE

## 2022-12-07 PROCEDURE — G8427 DOCREV CUR MEDS BY ELIG CLIN: HCPCS | Performed by: INTERNAL MEDICINE

## 2022-12-07 PROCEDURE — 3044F HG A1C LEVEL LT 7.0%: CPT | Performed by: FAMILY MEDICINE

## 2022-12-07 ASSESSMENT — SLEEP AND FATIGUE QUESTIONNAIRES
HOW LIKELY ARE YOU TO NOD OFF OR FALL ASLEEP WHILE LYING DOWN TO REST IN THE AFTERNOON WHEN CIRCUMSTANCES PERMIT: 2
ESS TOTAL SCORE: 3
HOW LIKELY ARE YOU TO NOD OFF OR FALL ASLEEP WHILE SITTING QUIETLY AFTER LUNCH WITHOUT ALCOHOL: 1
HOW LIKELY ARE YOU TO NOD OFF OR FALL ASLEEP WHEN YOU ARE A PASSENGER IN A CAR FOR AN HOUR WITHOUT A BREAK: 0
HOW LIKELY ARE YOU TO NOD OFF OR FALL ASLEEP WHILE SITTING AND READING: 0
HOW LIKELY ARE YOU TO NOD OFF OR FALL ASLEEP IN A CAR, WHILE STOPPED FOR A FEW MINUTES IN TRAFFIC: 0
HOW LIKELY ARE YOU TO NOD OFF OR FALL ASLEEP WHILE SITTING INACTIVE IN A PUBLIC PLACE: 0
HOW LIKELY ARE YOU TO NOD OFF OR FALL ASLEEP WHILE SITTING AND TALKING TO SOMEONE: 0
NECK CIRCUMFERENCE (INCHES): 17
HOW LIKELY ARE YOU TO NOD OFF OR FALL ASLEEP WHILE WATCHING TV: 0

## 2022-12-07 ASSESSMENT — ENCOUNTER SYMPTOMS
CHOKING: 0
EYE PAIN: 0
ABDOMINAL PAIN: 0
ALLERGIC/IMMUNOLOGIC NEGATIVE: 1
CHEST TIGHTNESS: 0
APNEA: 0
RHINORRHEA: 0
NAUSEA: 0
ABDOMINAL DISTENTION: 0
SHORTNESS OF BREATH: 0
PHOTOPHOBIA: 0
VOMITING: 0

## 2022-12-07 NOTE — PROGRESS NOTES
[facility]  [address] [address], [address]- [phone]   [facility] [address]. [address] [phone]     [place] [address]  Dept: [phone]  Loc: [phone]    Assessment:      Visit Diagnoses and Associated Orders       Hypersomnia   (New Problem)  -  Primary    needs work-up    POLYSOMNOGRAPHY (PSG) - Diagnostic Testing [86411 Custom]   - Future Order         Snoring   (New Problem)      needs work-up    POLYSOMNOGRAPHY (PSG) - Diagnostic Testing [67682 Custom]   - Future Order         Primary hypertension   (Stable)           Type 2 diabetes mellitus with other specified complication, without long-term current use of insulin (HCC)   (Stable)                    Plan:      One or more undiagnosed new problem with uncertain prognosis till final diagnosis is made. Differential diagnosis includes but not limited to: CHARO, PLMD's, narcolepsy, parasomnias. Reviewed CHARO (which is the highest likelihood diagnosis): pathophysiology, diagnosis, complications and treatment. Instructed him not to drive if drowsy. Continue medications per his PCP and other physicians. Limit caffeine use after 3pm. Will do PSG to rule-out CHARO and other sleep disorders. 1 wk follow up after study to review his results. The chronic medical conditions listed are directly related to the primary diagnosis listed above. The management of the primary diagnosis affects the secondary diagnosis and vice versa. This information was analyzed to assess complexity and medical decision making in regards to further testing and management. Continue meds for: hypertension and diabetes mellitus. Pt would medically benefit from wt loss for CHARO (diet, exercise, surgical).     Orders Placed This Encounter   Procedures    POLYSOMNOGRAPHY (PSG) - Diagnostic Testing            Subjective:     Patient ID: Aakash Wells is a 60 y.o. male. Chief Complaint   Patient presents with    Sleep Apnea    Breathing Problem    Daytime Sleepiness       HPI:      Aakash Wells is a 60 y.o. male referred by Janie Carter MD for a sleep evaluation. He complains of: snoring, excessive daytime sleepiness , non-restorative sleep, nocturia, napping, tossing and turning at night, and legs kicking at night. He denies: cataplexy and hypnagogic hallucinations. No recent stroke in July the left him with some left arm weakness. Ever since his stroke he has been kicking when he tries to fall asleep at nighttime as he was placed on trazodone 100 mg which she feels may have helped a least with some sleep. Symptoms began several years ago, gradually worsening since that time. Previous evaluation and treatment has included- none    Chronic stable medical conditions: hypertension and diabetes mellitus. DOT/CDL - no  FAA/'s license -no    Previous Report(s) Reviewed: historical medical records, office notes, andreferral letter(s). Pertinent data has been documented.     Avant - Avant Sleepiness Score: 3    Caffeine Intake - Coffee: 4 cup(s) per day    Social History     Socioeconomic History    Marital status:      Spouse name: Not on file    Number of children: 1    Years of education: Not on file    Highest education level: Not on file   Occupational History    Occupation: labor   Tobacco Use    Smoking status: Former     Packs/day: 1.00     Years: 15.00     Pack years: 15.00     Types: Cigarettes     Quit date:      Years since quittin.9    Smokeless tobacco: Current     Types: Chew   Vaping Use    Vaping Use: Never used   Substance and Sexual Activity    Alcohol use: No    Drug use: Never    Sexual activity: Not on file   Other Topics Concern    Not on file   Social History Narrative    He was  from wife    SS  since Dec    Covered under insurance of his wife     Dip nicotine 2 can/day Alcohol None     Social Determinants of Health     Financial Resource Strain: Low Risk     Difficulty of Paying Living Expenses: Not hard at all   Food Insecurity: No Food Insecurity    Worried About Running Out of Food in the Last Year: Never true    Ran Out of Food in the Last Year: Never true   Transportation Needs: Not on file   Physical Activity: Not on file   Stress: Not on file   Social Connections: Not on file   Intimate Partner Violence: Not on file   Housing Stability: Not on file        Current Outpatient Medications   Medication Instructions    aspirin EC 81 mg, Oral, DAILY    atorvastatin (LIPITOR) 80 mg, Oral, DAILY    clopidogrel (PLAVIX) 75 mg, Oral, DAILY    glipiZIDE (GLUCOTROL) 5 MG tablet TAKE 1 TABLET BY MOUTH TWICE DAILY BEFORE MEAL(S)    ipratropium (ATROVENT) 0.03 % nasal spray 2 sprays, Each Nostril, 3 TIMES DAILY PRN    lisinopril (PRINIVIL;ZESTRIL) 40 mg, Oral, DAILY    melatonin 3 mg, Oral, DAILY, 2 tablet nightly 6mgh    nicotine (NICODERM CQ) 21 MG/24HR 1 patch, TransDERmal, EVERY 24 HOURS    NIFEdipine (PROCARDIA XL) 30 mg, Oral, DAILY    NONFORMULARY TOTAL BEETS (blood pressure)    tiZANidine (ZANAFLEX) 4 mg, Oral, 3 TIMES DAILY PRN    traZODone (DESYREL) 100 mg, Oral, NIGHTLY       Allergies as of 12/07/2022    (No Known Allergies)       Patient Active Problem List   Diagnosis    HTN (hypertension)    Allergic rhinitis    Chronic back pain    Cerebrovascular accident (CVA) due to occlusion of right cerebellar artery ([address]    Type 2 diabetes mellitus ([address]    Chronic anticoagulation       Past Medical History:   Diagnosis Date    Allergic rhinitis 10/11/2012    Cerebrovascular accident (CVA) due to occlusion of right cerebellar artery ([address] 7/25/2022    [facility]  /    HTN (hypertension) 10/11/2012    Stroke (cerebrum) ([address] 07/25/2022    Type 2 diabetes mellitus ([address] 8/10/2022       Past Surgical History:   Procedure Laterality Date CARDIOVASCULAR STRESS TEST  Oct 2012    JHK, negative GXT       Family History   Problem Relation Age of Onset    Cancer Mother         breast ca. @age 68    Diabetes Father         @age 76       Review of Systems   Constitutional:  Positive for fatigue. Negative for activity change and appetite change. HENT:  Positive for congestion. Negative for nosebleeds, postnasal drip, rhinorrhea and sneezing. Eyes:  Negative for photophobia, pain and visual disturbance. Respiratory:  Negative for apnea, choking, chest tightness and shortness of breath. Cardiovascular: Negative. Gastrointestinal:  Negative for abdominal distention, abdominal pain, nausea and vomiting. Endocrine: Negative for cold intolerance and heat intolerance. Genitourinary:  Positive for frequency. Negative for difficulty urinating, dysuria and urgency. Musculoskeletal: Negative. Negative for neck pain and neck stiffness. Skin: Negative. Allergic/Immunologic: Negative. Neurological:  Positive for weakness. Negative for tremors, seizures and syncope. Hematological:  Negative for adenopathy. Does not bruise/bleed easily. Psychiatric/Behavioral:  Positive for sleep disturbance. Negative for agitation, behavioral problems and confusion. Objective:     Vitals:  Weight BMI   Wt Readings from Last 3 Encounters:   12/07/22 206 lb (93.4 kg)   11/10/22 206 lb (93.4 kg)   10/26/22 206 lb 9.6 oz (93.7 kg)    Body mass index is 28.73 kg/m². BP HR SaO2   BP Readings from Last 3 Encounters:   12/07/22 (!) 140/91   11/10/22 (!) 146/86   10/26/22 136/84    Pulse Readings from Last 3 Encounters:   12/07/22 84   11/10/22 67   10/26/22 80    SpO2 Readings from Last 3 Encounters:   12/07/22 98%   10/26/22 98%   08/10/22 98%        Physical Exam  Vitals reviewed. Constitutional:       General: He is not in acute distress. Appearance: Normal appearance. He is well-developed. He is not toxic-appearing or diaphoretic.    HENT: Head: Normocephalic and atraumatic. Not macrocephalic and not microcephalic. Right Ear: External ear normal.      Left Ear: External ear normal.      Nose: Septal deviation and mucosal edema present. No nasal deformity. Mouth/Throat:      Lips: Pink. Mouth: Mucous membranes are moist.      Tongue: No lesions. Palate: No mass. Pharynx: Uvula midline. Uvula swelling present. No oropharyngeal exudate. Tonsils: No tonsillar exudate or tonsillar abscesses. Comments: Tonsils: normal size  Eyes:      General: Lids are normal.      Extraocular Movements: Extraocular movements intact. Conjunctiva/sclera: Conjunctivae normal.      Pupils: Pupils are equal, round, and reactive to light. Neck:      Vascular: No JVD. Trachea: Trachea normal.      Comments: Neck Circ: 17 inches    Cardiovascular:      Rate and Rhythm: Normal rate and regular rhythm. Heart sounds: Normal heart sounds. Pulmonary:      Effort: Pulmonary effort is normal.      Breath sounds: Normal breath sounds. Abdominal:      General: Bowel sounds are normal.   Musculoskeletal:      Cervical back: Normal range of motion. Comments: No evidence of cyanosis or clubbing of nails   Skin:     General: Skin is warm. Nails: There is no clubbing. Neurological:      General: No focal deficit present. Mental Status: He is alert. Psychiatric:         Attention and Perception: Attention normal.         Mood and Affect: Mood and affect normal.         Speech: Speech normal.         Behavior: Behavior normal. Behavior is cooperative. Thought Content:  Thought content normal.       Electronically signed by Monica Armstrong MD on12/7/2022 at 1:40 PM

## 2022-12-07 NOTE — LETTER
[facility] Sleep Medicine  [place] [id number] [place] [address]  Phone: [phone]  Fax: [phone]           Eliu Rivera MD      December 7, 2022     Patient: Celina Strickland   MR Number: [id number]   YOB: 1960   Date of Visit: 12/7/2022       Dear Dr. Marlo Cantu:    Thank you for referring Charlet Schlatter to me for evaluation/treatment. Below are the relevant portions of my assessment and plan of care. Visit Diagnoses and Associated Orders       Hypersomnia   (New Problem)  -  Primary    needs work-up    POLYSOMNOGRAPHY (PSG) - Diagnostic Testing [28885 Custom]   - Future Order         Snoring   (New Problem)      needs work-up    POLYSOMNOGRAPHY (PSG) - Diagnostic Testing [50616 Custom]   - Future Order         Primary hypertension   (Stable)           Type 2 diabetes mellitus with other specified complication, without long-term current use of insulin (HCC)   (Stable)                   One or more undiagnosed new problem with uncertain prognosis till final diagnosis is made. Differential diagnosis includes but not limited to: CHARO, PLMD's, narcolepsy, parasomnias. Reviewed CHARO (which is the highest likelihood diagnosis): pathophysiology, diagnosis, complications and treatment. Instructed him not to drive if drowsy. Continue medications per his PCP and other physicians. Limit caffeine use after 3pm. Will do PSG to rule-out CHARO and other sleep disorders. 1 wk follow up after study to review his results. The chronic medical conditions listed are directly related to the primary diagnosis listed above. The management of the primary diagnosis affects the secondary diagnosis and vice versa. This information was analyzed to assess complexity and medical decision making in regards to further testing and management. Continue meds for: hypertension and diabetes mellitus. Pt would medically benefit from wt loss for CHARO (diet, exercise, surgical).     Orders Placed This Encounter   Procedures    POLYSOMNOGRAPHY (PSG) - Diagnostic Testing         If you have questions, please do not hesitate to call me. I look forward to following Joseline Fagan along with you.     Sincerely,        Juan Ramon Gorman MD    CC providers:  Paulette Neely MD  [address] [place] [address]  [address]

## 2022-12-07 NOTE — PROGRESS NOTES
SUBJECTIVE:  Patient ID: Dori Huynh is a 60 y.o. male. Chief Complaint:  Chief Complaint   Patient presents with    Hand Pain     Both hand swollen since July        HPI  60year old Male   Stiff in Left shoulder since Stroke  Left thumb disorder   Neck & Shoulder issue  Physical therapy while in Hospital & @ home   ENT  prescribe Nasal spray   NIDDM check glucose bid  average 230     Past Medical History:   Diagnosis Date    Allergic rhinitis 10/11/2012    Cerebrovascular accident (CVA) due to occlusion of right cerebellar artery ([address] 7/25/2022    [facility]  /[facility]    HTN (hypertension) 10/11/2012    Stroke (cerebrum) ([facility] 07/25/2022    Type 2 diabetes mellitus ([facility] 8/10/2022     Past Surgical History:   Procedure Laterality Date    CARDIOVASCULAR STRESS TEST  Oct 2012    JHK, negative GXT     No Known Allergies    Family History   Problem Relation Age of Onset    Cancer Mother         breast ca.   @age 68    Diabetes Father         @age 76     Social History     Social History Narrative    He was  from wife    SS  since Dec    Covered under insurance of his wife     Dip nicotine 2 can/day Alcohol None       Patient Active Problem List   Diagnosis    HTN (hypertension)    Allergic rhinitis    Chronic back pain    Cerebrovascular accident (CVA) due to occlusion of right cerebellar artery (HCC)    Type 2 diabetes mellitus (HCC)    Chronic anticoagulation     Current Outpatient Medications   Medication Sig Dispense Refill    glipiZIDE (GLUCOTROL) 5 MG tablet TAKE 1 TABLET BY MOUTH TWICE DAILY BEFORE MEAL(S) 60 tablet 2    tiZANidine (ZANAFLEX) 4 MG tablet Take 1 tablet by mouth 3 times daily as needed (Muscle tightness/spasm) 30 tablet 1    ipratropium (ATROVENT) 0.03 % nasal spray 2 sprays by Each Nostril route 3 times daily as needed for Rhinitis 30 mL 3    clopidogrel (PLAVIX) 75 MG tablet Take 1 tablet by mouth daily 30 tablet 2    NIFEdipine (PROCARDIA XL) 30 MG extended release tablet Take 1 tablet by mouth daily 30 tablet 2    traZODone (DESYREL) 100 MG tablet Take 1 tablet by mouth nightly 30 tablet 2    lisinopril (PRINIVIL;ZESTRIL) 40 MG tablet Take 1 tablet by mouth daily 30 tablet 2    atorvastatin (LIPITOR) 80 MG tablet Take 1 tablet by mouth daily 30 tablet 2    melatonin 3 MG TABS tablet Take 3 mg by mouth daily 2 tablet nightly 6mgh      nicotine (NICODERM CQ) 21 MG/24HR Place 1 patch onto the skin every 24 hours      aspirin EC 81 MG EC tablet Take 1 tablet by mouth daily. 30 tablet 3    NONFORMULARY TOTAL BEETS (blood pressure)       No current facility-administered medications for this visit. Lab Results   Component Value Date    WBC 6.4 08/10/2022    HGB 13.6 08/10/2022    HCT 40.6 08/10/2022    MCV 90.4 08/10/2022     08/10/2022     No results found for: CHOL  No results found for: TRIG  No results found for: HDL  No results found for: LDLCHOLESTEROL, LDLCALC  No results found for: LABVLDL, VLDL  No results found for: [facility]    Chemistry        Component Value Date/Time     11/15/2022 1017    K 5.1 11/15/2022 1017     11/15/2022 1017    CO2 22 11/15/2022 1017    BUN 23 (H) 11/15/2022 1017    CREATININE 1.3 11/15/2022 1017        Component Value Date/Time    CALCIUM 10.5 11/15/2022 1017    ALKPHOS 61 08/10/2022 1531    AST 32 08/10/2022 1531    ALT 60 (H) 08/10/2022 1531    BILITOT 0.4 08/10/2022 1531            Review of Systems   Constitutional: Negative. Negative for chills, diaphoresis, fatigue and fever. HENT: Negative. Negative for congestion. Eyes: Negative. Negative for photophobia and visual disturbance. Respiratory:  Negative for cough and chest tightness. Hx Tobacco use   Cardiovascular:  Negative for chest pain, palpitations and leg swelling. Gastrointestinal:  Negative for abdominal pain, nausea and vomiting. Endocrine:        DM Type II   Genitourinary:  Negative for dysuria, flank pain and frequency.    Musculoskeletal: Positive for neck pain. Negative for gait problem and neck stiffness. Neck & shoulder pain  Limitation ROM lt sshoulder   Skin:  Negative for rash and wound. Allergic/Immunologic: Negative. Neurological:  Positive for numbness. Negative for dizziness. Hx Stroke  Admission & [facility] @[facility]   Hematological: Negative. Psychiatric/Behavioral:  Positive for sleep disturbance. Negative for behavioral problems, confusion, self-injury and suicidal ideas. The patient is not nervous/anxious. Wife is taking good care of him     OBJECTIVE:  /80 (Site: Right Upper Arm, Position: Sitting, Cuff Size: Medium Adult)   Pulse 84   Temp 97.6 °F (36.4 °C)   Resp 14   Wt 212 lb (96.2 kg)   SpO2 99%   BMI 29.57 kg/m²   Physical Exam  Constitutional:       Comments: BMI 29   HENT:      Head: Normocephalic. Eyes:      Extraocular Movements: Extraocular movements intact. Pupils: Pupils are equal, round, and reactive to light. Cardiovascular:      Rate and Rhythm: Normal rate and regular rhythm. Pulses: Normal pulses. Heart sounds: Normal heart sounds. Pulmonary:      Breath sounds: No wheezing or rhonchi. Musculoskeletal:         General: No swelling or signs of injury. Cervical back: Neck supple. Right lower leg: No edema. Left lower leg: No edema. Comments: Left shoulder + limitation   Skin:     Findings: No erythema or rash. Neurological:      Mental Status: He is alert and oriented to person, place, and time. Psychiatric:         Behavior: Behavior normal.         Thought Content: Thought content normal.         Judgment: Judgment normal.       ASSESSMENT/PLAN:      Diagnosis Orders   1. Shoulder disorder  Danish García MD, Orthopedics and Sports Medicine (Hip; Knee; Shoulder), [place]      2.  Type 2 diabetes mellitus with other specified complication, without long-term current use of insulin ([facility]) SITagliptin (JANUVIA) 100 MG tablet    Hemoglobin A1C    Comprehensive Metabolic Panel      3. Colon cancer screening  FRANCINE - Melony Mckeon MD, Gastroenterology, [facility]      4. Primary hypertension        5. Lipid disorder        6. Chronic anticoagulation        7.  S/P stroke due to cerebrovascular disease              Neck pain MRI C spine 11/10/2022 +Multiple disc disorder referred Physical therapy Pt will start tomorrow   Hypertension BP reading adequate continue current Rx  S/P stroke continue Rx Plavix  Lipid disorder continue current Rx  Shoulder disorder get Ortho consult as above

## 2022-12-08 ENCOUNTER — HOSPITAL ENCOUNTER (OUTPATIENT)
Dept: PHYSICAL THERAPY | Age: 62
Setting detail: THERAPIES SERIES
Discharge: HOME | End: 2022-12-08
Payer: MEDICAID

## 2022-12-08 LAB
ESTIMATED AVERAGE GLUCOSE: 151.3 MG/DL
HBA1C MFR BLD: 6.9 %

## 2022-12-08 PROCEDURE — 97140 MANUAL THERAPY 1/> REGIONS: CPT | Performed by: PHYSICAL THERAPIST

## 2022-12-08 PROCEDURE — 97161 PT EVAL LOW COMPLEX 20 MIN: CPT | Performed by: PHYSICAL THERAPIST

## 2022-12-08 PROCEDURE — 97530 THERAPEUTIC ACTIVITIES: CPT | Performed by: PHYSICAL THERAPIST

## 2022-12-08 NOTE — FLOWSHEET NOTE
[facility], [facility] [email or web address] [address], [address] [place] [address]  Phone: [phone]   Fax:     [phone]                                                     Physical Therapy Daily Treatment Note  Date:  2022    Patient Name:  Florin Salazar    :  1960  MRN: [id number]  Restrictions/Precautions:    Medical/Treatment Diagnosis Information:  Diagnosis: M54.2 (ICD-10-CM) - Neck pain  Treatment Diagnosis: E32.0  Insurance/Certification information:  PT Insurance Information: [facility]  Physician Information:  Dr. Yue Ray  Has the plan of care been signed (Y/N):        []  Yes  [x]  No     Date of Patient follow up with Physician:       Is this a Progress Report:     []  Yes  [x]  No        If Yes:  Date Range for reporting period:  Inmpbpwes68/8  Ending    Progress report will be due (10 Rx or 30 days whichever is less):        Recertification will be due (POC Duration  / 90 days whichever is less):          Visit # Insurance Allowable Auth Required   1 ?  []  Yes []  No        Functional Scale: FOTO 53    Date assessed:       Latex Allergy:  [x]NO      []YES  Preferred Language for Healthcare:   [x]English       []other:      Pain level:  3/10     SUBJECTIVE:  See eval    OBJECTIVE: See eval  Observation:   Test measurements:      RESTRICTIONS/PRECAUTIONS:     Exercises/Interventions:   Exercise/Equipment Resistance/Repetitions Other comments   Stretching/PROM     CROM     Chin tuck Supine 10x10\"    UT side bend stretch     Levater scap stretch     Scalene stretch     Pectoral stretch               Isometrics     Retraction 3x10    Shrugs w/ bkwd roll 3x10    Cervical Flexion      Cervical Extension     Cervical sidebending     Gripping  30x         PRE's     External Rotation     Internal Rotation     Serratus     Biceps     Triceps     Shrugs     Horizontal Abd with ER     Reverse Flys     EXT     Flexion Abduction          Cable Column/Theraband     Scapular Retraction     External Rotation     Internal Rotation     Ext     TRIC     Lats     Shrugs     Flex     BIC     PNF     No money 3x10               Manual intervention Suboccipital release, cervical distraction, pec stretch 8'             Therapeutic Exercise and NMR EXR  [] (07314) Provided verbal/tactile cueing for activities related to strengthening, flexibility, endurance, ROM  for improvements in cervical, postural, scapular, scapulothoracic and UE control with self care, reaching, carrying, lifting, house/yardwork, driving/computer work.    [] (23753) Provided verbal/tactile cueing for activities related to improving balance, coordination, kinesthetic sense, posture, motor skill, proprioception  to assist with cervical, scapular, scapulothoracic and UE control with self care, reaching, carrying, lifting, house/yardwork, driving/computer work. Therapeutic Activities:    [x] (96046 or 48374) Provided verbal/tactile cueing for activities related to improving balance, coordination, kinesthetic sense, posture, motor skill, proprioception and motor activation to allow for proper function of cervical, scapular, scapulothoracic and UE control with self care, carrying, lifting, driving/computer work.      Home Exercise Program:    [x] (74262) Reviewed/Progressed HEP activities related to strengthening, flexibility, endurance, ROM of cervical, scapular, scapulothoracic and UE control with self care, reaching, carrying, lifting, house/yardwork, driving/computer work  [] (62142) Reviewed/Progressed HEP activities related to improving balance, coordination, kinesthetic sense, posture, motor skill, proprioception of cervical, scapular, scapulothoracic and UE control with self care, reaching, carrying, lifting, house/yardwork, driving/computer work      Manual Treatments:  PROM / STM / Oscillations-Mobs:  G-I, II, III, IV (PA's, Inf., Post.)  [x] (16885) Provided manual therapy to mobilize soft tissue/joints of cervical/CT, scapular GHJ and UE for the purpose of decreasing headache, modulating pain, promoting relaxation,  increasing ROM, reducing/eliminating soft tissue swelling/inflammation/restriction, improving soft tissue extensibility and allowing for proper ROM for normal function with self care, reaching, carrying, lifting, house/yardwork, driving/computer work    Modalities:      Charges:  Timed Code Treatment Minutes: 25   Total Treatment Minutes: 50       [x] EVAL (LOW) 93483 (typically 20 minutes face-to-face)  [] EVAL (MOD) 81136 (typically 30 minutes face-to-face)  [] EVAL (HIGH) 12563 (typically 45 minutes face-to-face)  [] RE-EVAL     [] RJ(55346) x     [] IONTO  [] NMR (95361) x     [] VASO  [x] Manual (03639) x 1     [] Other:  [x] TA x 1     [] Mech Traction (85022)  [] ES(attended) (72063)      [] ES (un) (02903):     HEP instruction:   Access Code: [email or web address]  URL: [email or web address]  Date: 12/08/2022  Prepared by: Jaquelin Bennett    Exercises  Putty Squeezes - 2-3 x daily - 7 x weekly - 1 sets - 30 reps  Seated Scapular Retraction - 2-3 x daily - 7 x weekly - 3 sets - 10 reps  Seated Shoulder Shrug Circles AROM Backward - 2-3 x daily - 7 x weekly - 3 sets - 10 reps  Supine Passive Cervical Retraction - 2-3 x daily - 7 x weekly - 1 sets - 10 reps - 10 sec hold  Shoulder External Rotation and Scapular Retraction - 2-3 x daily - 7 x weekly - 3 sets - 10 reps      GOALS:   Patient stated goal: Get rid of pain and gain normal function    [] Progressing: [] Met: [] Not Met: [] Adjusted    Therapist goals for Patient:   Short Term Goals: To be achieved in: 2 weeks  1. Independent in HEP and progression per patient tolerance, in order to prevent re-injury. [] Progressing: [] Met: [] Not Met: [] Adjusted   2. Patient will have a decrease in pain to facilitate improvement in movement, function, and ADLs as indicated by Functional Deficits.     [] Progressing: [] Met: [] Not Met: [] Adjusted    Long Term Goals: To be achieved in: 8-10 weeks  1. Improve FOTO score to at least 66 to assist with reaching prior level of function. [] Progressing: [] Met: [] Not Met: [] Adjusted  2. Patient will demonstrate increased AROM to WNL of cervical/thoracic spine and L UE ROM to allow for proper joint functioning as indicated by patients Functional Deficits. [] Progressing: [] Met: [] Not Met: [] Adjusted  Patient will demonstrate an increase in postural awareness and control and activation of  Deep cervical stabilizers to allow for proper functional mobility as indicated by patients Functional Deficits. [] Progressing: [] Met: [] Not Met: [] Adjusted  4. Patient will return to daily functional activities without increased symptoms or restriction. [] Progressing: [] Met: [] Not Met: [] Adjusted  5. Patient will be able to hunt without increased symptoms or restriction. [] Progressing: [] Met: [] Not Met: [] Adjusted    Overall Progression Towards Functional goals/ Treatment Progress Update:  [] Patient is progressing as expected towards functional goals listed. [] Progression is slowed due to complexities/Impairments listed. [] Progression has been slowed due to co-morbidities. [x] Plan just implemented, too soon to assess goals progression <30days   [] Goals require adjustment due to lack of progress  [] Patient is not progressing as expected and requires additional follow up with physician  [] Other    Prognosis for POC: [x] Good [] Fair  [] Poor      Patient requires continued skilled intervention: [x] Yes  [] No    Treatment/Activity Tolerance:  [x] Patient able to complete treatment  [] Patient limited by fatigue  [] Patient limited by pain     [] Patient limited by other medical complications  [] Other:                   Patient Education:  12/8 Patient education on PT and plan of care including diagnosis, prognosis, treatment goals and options.

## 2022-12-08 NOTE — PLAN OF CARE
The [address] [place] [address] and [address]  Phone [phone]  Fax [phone]                                                          Physical Therapy Certification    Dear Dr. Cm Menon,     We had the pleasure of evaluating the following patient for physical therapy services at [address] [place] [address]. A summary of our findings can be found in the initial assessment below. This includes our plan of care. If you have any questions or concerns regarding these findings, please do not hesitate to contact me at the office phone number checked above. Thank you for the referral.       Physician Signature:_______________________________Date:__________________  By signing above (or electronic signature), therapists plan is approved by physician      Patient: Samuel Velez   : 1960   MRN: [id number]  Referring Physician: Dr. Chaim Evans     Evaluation Date: 2022      Medical Diagnosis Information:  Diagnosis: M54.2 (ICD-10-CM) - Neck pain   Treatment Diagnosis: M54.2                                         Insurance information: PT Insurance Information: [address]    Precautions/ Contra-indications:     Latex Allergy:  [x]NO      []YES  Preferred Language for Healthcare:   [x]English       []other:    SUBJECTIVE: Patient stated complaint: Pt reports he has had neck pain prior to his stroke in July, but his symptoms have progressively gotten worse since then. It stays constant and he can tell that he has had loss of function of his L arm especially. He notices swelling in his hand and cannot perform gripping. Radicular symptoms noted into L arm all the way to the fingers.         Imaging: MRI of cervical spine - see MD note    Right handed     Relevant Medical History: Diabetes, HBP, stroke (L hemiparesis) - 2022, anxiety, kidney disease    C-SSRS Triggered by Intake questionnaire (Past 2 wk assessment): [x] No, Questionnaire did not trigger screening.   [] Yes, Patient intake triggered further evaluation      [] C-SSRS Screening completed  [] PCP notified via Plan of Care  [] Emergency services notified     Functional Disability Index: FOTO 53    Pain Scale: 3/10  Easing factors: stretching exercises, warm shower   Provocative factors: \"constant pain\", swelling in the left arm, limited motion in arm, shaking in arm, getting dressed, night     Type: []Constant   []Intermittent  []Radiating []Localized []other:     Numbness/Tingling: + into L UE    Occupation/School: Does not work      Living Status/Prior Level of Function: Independent with ADLs and IADLs, hunting      OBJECTIVE:     ROM AROM Comments   Flexion 55    Extension 30    Side bend R 15    Side bend L 10    Rotation R Mod limited  \"Crunching\"   Rotation L Mod limited  \"Crunching\"    Shoulder AROM clearing R WFL   L flexion 140, abd 30 (P!), IR mod limited with p!               Strength L R Comments   Neck flexion (C1-2)      Neck side bend (C3)      Shoulder elevation (C4) NT 4+    Shoulder abduction (C5) NT 4+    Elbow flexion and /or wrist extension  (C6) 4- 4+    Elbow extension and/or wrist flexion  (C7) 4- 4+    Thumb extension and/or ulnar deviation ((C8)      Abduction and /or adduction of hand intrinsics (T1)      IR/ER  4-/3+ 4+/4-        Special Tests Results Comments   Foraminal compression test  +    Distraction test +    Upper limb tension test -    Shoulder abduction(relief) test  -    Vertebral artery test  -    Valsalva test -    Neurologic Signs +    Joint play assessments  hypo    other         Joint mobility:    []Normal    [x]Hypo   []Hyper    Palpation: TTP on suboccipitals and L sided paraspinals     Functional Mobility/Transfers: WFL    Posture: significant fwd head, rounded shoulders     Bandages/Dressings/Incisions: n/a     Gait: (include devices/WB status): WNL     Orthopedic Special Tests: see above                       [x] Patient history, allergies, meds reviewed. Medical chart reviewed. See intake form. Review Of Systems (ROS):  [x]Performed Review of systems (Integumentary, CardioPulmonary, Neurological) by intake and observation. Intake form has been scanned into medical record. Patient has been instructed to contact their primary care physician regarding ROS issues if not already being addressed at this time. Co-morbidities/Complexities (which will affect course of rehabilitation):   []None           Arthritic conditions   []Rheumatoid arthritis (M05.9)  []Osteoarthritis (M19.91)   Cardiovascular conditions   [x]Hypertension (I10)  []Hyperlipidemia (E78.5)  []Angina pectoris (I20)  []Atherosclerosis (I70)   Musculoskeletal conditions   [x]Disc pathology   []Congenital spine pathologies   []Prior surgical intervention  []Osteoporosis (M81.8)  []Osteopenia (M85.8)   Endocrine conditions   []Hypothyroid (E03.9)  []Hyperthyroid Gastrointestinal conditions   []Constipation (O55.34)   Metabolic conditions   []Morbid obesity (E66.01)  [x]Diabetes type 1(E10.65) or 2 (E11.65)   []Neuropathy (G60.9)     Pulmonary conditions   []Asthma (J45)  []Coughing   []COPD (J44.9)   Psychological Disorders  [x]Anxiety (F41.9)  []Depression (F32.9)   []Other:   [x]Other:   Stroke 7/2022       Barriers to/and or personal factors that will affect rehab potential:              [x]Age  []Sex              []Motivation/Lack of Motivation                        [x]Co-Morbidities              []Cognitive Function, education/learning barriers              []Environmental, home barriers              []profession/work barriers  []past PT/medical experience  []other:  Justification:     Falls Risk Assessment (30 days):   [x] Falls Risk assessed and no intervention required.   [] Falls Risk assessed and Patient requires intervention due to being higher risk   TUG score (>12s at risk):     [] Falls education provided, including     ASSESSMENT:    Functional Impairments:     [x]Noted cervical/thoracic/GHJ joint hypomobility   []Noted cervical/thoracic/GHJ joint hypermobility   [x]Decreased cervical/UE functional ROM   []Noted Headache pain aggravated by neck movements with/without dizziness   []Abnormal reflexes/sensation/myotomal/dermatomal deficits   []Decreased DCF control or ability to hold head up   [x]Decreased RC/scapular/core strength and neuromuscular control    [x]Decreased UE functional strength   []other:      Functional Activity Limitations (from functional questionnaire and intake)   [x]Reduced ability to tolerate prolonged functional positions   [x]Reduced ability or difficulty with changes of positions or transfers between positions   [x]Reduced ability to maintain good posture and demonstrate good body mechanics with sitting, bending, and lifting   [x] Reduced ability or tolerance with driving and/or computer work   [x]Reduced ability to perform lifting, reaching, carrying tasks   []Reduced ability to concentrate   []Reduced ability to sleep    [x]Reduced ability to tolerate any impact through UE or spine   [x]Reduced ability to ambulate prolonged functional periods/distances   []other:    Participation Restrictions   [x]Reduced participation in self care activities   [x]Reduced participation in home management activities   []Reduced participation in work activities   [x]Reduced participation in social activities. [x]Reduced participation in sport/recreational activities.     Classification/Subgrouping:   []signs/symptoms consistent with neck pain with mobility deficits     []signs/symptoms consistent with neck pain with movement coordinated impairments    []signs/symptoms consistent with neck pain with radiating pain    []signs/symptoms consistent with neck pain with headaches (cervicogenic)    []Signs/symptoms consistent with nerve root involvement including myotome & dermatome dysfunction   []sign/symptoms consistent with facet dysfunction of cervical and thoracic spine    []signs/symptoms consistent suggesting central cord compression/UMN syndromes   []signs/symptoms consistent with discogenic cervical pain   []signs/symptoms consistent with rib dysfunction   [x]signs/symptoms consistent with postural dysfunction   [x]signs/symptoms consistent with shoulder pathology    []signs/symptoms consistent with post-surgical status including decreased ROM, strength and function. []signs/symptoms consistent with pathology which may benefit from Dry Needling   []signs/symptoms which may limit the use of advanced manual therapy techniques: (Hypertension, recent trauma, intolerance to end range positions, prior TIA, visual issues, UE myotomes loss )     Prognosis/Rehab Potential:      []Excellent   [x]Good    []Fair   []Poor    Tolerance of evaluation/treatment:    []Excellent   [x]Good    []Fair   []Poor      Physical Therapy Evaluation Complexity Justification  [x] A history of present problem with:  [] no personal factors and/or comorbidities that impact the plan of care;  []1-2 personal factors and/or comorbidities that impact the plan of care  [x]3 personal factors and/or comorbidities that impact the plan of care  [x] An examination of body systems using standardized tests and measures addressing any of the following: body structures and functions (impairments), activity limitations, and/or participation restrictions;:  [] a total of 1-2 or more elements   [] a total of 3 or more elements   [x] a total of 4 or more elements   [x] A clinical presentation with:  [x] stable and/or uncomplicated characteristics   [] evolving clinical presentation with changing characteristics  [] unstable and unpredictable characteristics;   [x] Clinical decision making of [x] low, [] moderate, [] high complexity using standardized patient assessment instrument and/or measurable assessment of functional outcome.     [x] EVAL (LOW) 40877 (typically 20 minutes face-to-face)  [] EVAL (MOD) 11494 (typically 30 minutes face-to-face)  [] EVAL (HIGH) 76508 (typically 45 minutes face-to-face)  [] RE-EVAL     PLAN:   Frequency/Duration:  2 days per week for 4 Weeks:  Interventions:  [x]  Therapeutic exercise including: strength training, ROM, for cervical spine,scapula, core and Upper extremity, including postural re-education. [x]  NMR activation and proprioception for Deep cervical flexors, periscapular and RC muscles and Core, including postural re-education. [x]  Manual therapy as indicated for C/T spine, ribs, Soft tissue to include: Dry Needling/IASTM, STM, PROM, Gr I-IV mobilizations, manipulation. [x] Modalities as needed that may include: thermal agents, E-stim, Biofeedback, US, iontophoresis as indicated  [x] Patient education on joint protection, postural re-education, activity modification, progression of HEP. HEP instruction:   Access Code: [email or web address]  URL: [email or web address]  Date: 12/08/2022  Prepared by: [place] Score    Exercises  Putty Squeezes - 2-3 x daily - 7 x weekly - 1 sets - 30 reps  Seated Scapular Retraction - 2-3 x daily - 7 x weekly - 3 sets - 10 reps  Seated Shoulder Shrug Circles AROM Backward - 2-3 x daily - 7 x weekly - 3 sets - 10 reps  Supine Passive Cervical Retraction - 2-3 x daily - 7 x weekly - 1 sets - 10 reps - 10 sec hold  Shoulder External Rotation and Scapular Retraction - 2-3 x daily - 7 x weekly - 3 sets - 10 reps      GOALS:   Patient stated goal: Get rid of pain and gain normal function    [] Progressing: [] Met: [] Not Met: [] Adjusted    Therapist goals for Patient:   Short Term Goals: To be achieved in: 2 weeks  1. Independent in HEP and progression per patient tolerance, in order to prevent re-injury. [] Progressing: [] Met: [] Not Met: [] Adjusted   2. Patient will have a decrease in pain to facilitate improvement in movement, function, and ADLs as indicated by Functional Deficits.     [] Progressing: [] Met: [] Not Met: [] Adjusted    Long Term Goals: To be achieved in: 8-10 weeks  1. Improve FOTO score to at least 66 to assist with reaching prior level of function. [] Progressing: [] Met: [] Not Met: [] Adjusted  2. Patient will demonstrate increased AROM to WNL of cervical/thoracic spine and L UE ROM to allow for proper joint functioning as indicated by patients Functional Deficits. [] Progressing: [] Met: [] Not Met: [] Adjusted  Patient will demonstrate an increase in postural awareness and control and activation of  Deep cervical stabilizers to allow for proper functional mobility as indicated by patients Functional Deficits. [] Progressing: [] Met: [] Not Met: [] Adjusted  4. Patient will return to daily functional activities without increased symptoms or restriction. [] Progressing: [] Met: [] Not Met: [] Adjusted  5. Patient will be able to hunt without increased symptoms or restriction. [] Progressing: [] Met: [] Not Met: [] Adjusted     Electronically signed by:  Rach Cook PT    Note: If patient does not return for scheduled/ recommended follow up visits, this note will serve as a discharge from care along with most recent update on progress.

## 2022-12-09 ENCOUNTER — TELEPHONE (OUTPATIENT)
Dept: SLEEP CENTER | Age: 62
End: 2022-12-09

## 2022-12-09 ENCOUNTER — OFFICE VISIT (OUTPATIENT)
Dept: ORTHOPEDIC SURGERY | Age: 62
End: 2022-12-09

## 2022-12-09 VITALS — BODY MASS INDEX: 29.68 KG/M2 | HEIGHT: 71 IN | WEIGHT: 212 LBS

## 2022-12-09 DIAGNOSIS — M25.512 LEFT SHOULDER PAIN, UNSPECIFIED CHRONICITY: Primary | ICD-10-CM

## 2022-12-09 NOTE — PROGRESS NOTES
Date:  2022    Name:  Michelle Fitzpatrick  Address:  [address]    :  1960      Age:   60 y.o.    SSN:  [id number]      Medical Record Number:  [id number]    Reason for Visit:    Chief Complaint    Shoulder Pain (New patient left shoulder )      DOS:2022     HPI: Connor Arceo is a 60 y.o. male here today for left shoulder pain, left arm pain and chronic left hand swelling. He unfortunately suffered a stroke in July of this year. It primarily affected his left side. His left leg is getting stronger but left arm has not turned the corner yet. Over the past 4, 5 months that he has noted progressive limited range of motion stiffness pain throughout his left arm weakness and swelling in the left hand. He was referred to our office by his primary care physician to make sure he did not have any muscular structural damage at his left shoulder with arthritis in his left shoulder that can potentially be masked by his stroke. ROS: All systems reviewed on patient intake form. Pertinent items are noted in HPI. Past Medical History:   Diagnosis Date    Allergic rhinitis 10/11/2012    Cerebrovascular accident (CVA) due to occlusion of right cerebellar artery ([address] 2022    [facility]  /    HTN (hypertension) 10/11/2012    Stroke (cerebrum) ([facility] 2022    Type 2 diabetes mellitus ([facility] 8/10/2022        Past Surgical History:   Procedure Laterality Date    CARDIOVASCULAR STRESS TEST  Oct 2012    JHK, negative GXT       Family History   Problem Relation Age of Onset    Cancer Mother         breast ca.   @age 68    Diabetes Father         @age 76       Social History     Socioeconomic History    Marital status:     Number of children: 1   Occupational History    Occupation: labor   Tobacco Use    Smoking status: Former     Packs/day: 1.00     Years: 15.00     Pack years: 15.00     Types: Cigarettes     Quit date:      Years since quittin.9 Smokeless tobacco: Current     Types: Chew   Vaping Use    Vaping Use: Never used   Substance and Sexual Activity    Alcohol use: No    Drug use: Never   Social History Narrative    He was  from wife    SS  since Dec    Covered under insurance of his wife     Dip nicotine 2 can/day Alcohol None     Social Determinants of Health     Financial Resource Strain: Low Risk     Difficulty of Paying Living Expenses: Not hard at all   Food Insecurity: No Food Insecurity    Worried About [employer] in the Last Year: Never true    Ran Out of Food in the Last Year: Never true       Current Outpatient Medications   Medication Sig Dispense Refill    SITagliptin (JANUVIA) 100 MG tablet Take 1 tablet by mouth daily 90 tablet 1    glipiZIDE (GLUCOTROL) 5 MG tablet TAKE 1 TABLET BY MOUTH TWICE DAILY BEFORE MEAL(S) 60 tablet 2    tiZANidine (ZANAFLEX) 4 MG tablet Take 1 tablet by mouth 3 times daily as needed (Muscle tightness/spasm) 30 tablet 1    ipratropium (ATROVENT) 0.03 % nasal spray 2 sprays by Each Nostril route 3 times daily as needed for Rhinitis 30 mL 3    clopidogrel (PLAVIX) 75 MG tablet Take 1 tablet by mouth daily 30 tablet 2    NIFEdipine (PROCARDIA XL) 30 MG extended release tablet Take 1 tablet by mouth daily 30 tablet 2    traZODone (DESYREL) 100 MG tablet Take 1 tablet by mouth nightly 30 tablet 2    lisinopril (PRINIVIL;ZESTRIL) 40 MG tablet Take 1 tablet by mouth daily 30 tablet 2    atorvastatin (LIPITOR) 80 MG tablet Take 1 tablet by mouth daily 30 tablet 2    NONFORMULARY TOTAL BEETS (blood pressure)      melatonin 3 MG TABS tablet Take 3 mg by mouth daily 2 tablet nightly 6mgh      nicotine (NICODERM CQ) 21 MG/24HR Place 1 patch onto the skin every 24 hours      aspirin EC 81 MG EC tablet Take 1 tablet by mouth daily. 30 tablet 3     No current facility-administered medications for this visit. No Known Allergies    Vital signs: There were no vitals taken for this visit. Gen:  Subtle facial droop and subtle slurred speech. Able to walk without any assistive device minimal limp. L shoulder exam    Inspection:  Held in a normal posture. Normal contour at the acromioclavicular joint. No swelling, ecchymosis, or erythema about the shoulder. No atrophy appreciated. No scapular winging. Palpation:  No subacromial crepitus. No tenderness of the AC joint. No greater tuberosity tenderness. No tenderness in the bicipital groove. Range of Motion: Limited passive and active ROM with stiffness and pain, forward elevation abduction limited at 100 degrees both actively and passively external rotation limited to 5-10 on the side internal rotation is also limited with arm abducted indicating posterior capsule tightness. Normal scapulothoracic rhythm. Strength:  Normal supraspinatus, infraspinatus, and subscapularis muscle strength. Stability: No anterior instability. No posterior instability. Special Tests: Impingement findings are negative. Labral findings are negative. Speed sign and Yergason signs are both negative. Crossover sign is negative. Belly press sign is negative. Lift off sign is negative. Other findings: The skin is warm dry and well perfused. 2+ radial pulse. Sensation is intact to light touch over the deltoid. R comparison shoulder exam    Inspection:  Held in a normal posture. Normal contour at the acromioclavicular joint. No swelling, ecchymosis, or erythema about the shoulder. No atrophy appreciated. No scapular winging. Palpation:  No subacromial crepitus. No tenderness of the AC joint. No greater tuberosity tenderness. No tenderness in the bicipital groove. Range of Motion: Full passive and active ROM. Normal scapulothoracic rhythm. Strength:  Normal supraspinatus, infraspinatus, and subscapularis muscle strength. Stability: No anterior instability. No posterior instability. Special Tests: Impingement findings are negative. Labral findings are negative. Speed sign and Yergason signs are both negative. Crossover sign is negative. Belly press sign is negative. Lift off sign is negative. Other findings: The skin is warm dry and well perfused. 2+ radial pulse. Sensation is intact to light touch over the deltoid. Diagnostics:  Radiology:       3 views of the left shoulder reviewed in detail  Impression:  Maintained glenohumeral osseous alignment no acute fracture or dislocation. Assessment: 58-year-old gentleman suffered a acute stroke which primarily affected the left side of his body. His left shoulder weakness pain and stiffness is primarily related to his neurologic damage. Plan: Pertinent imaging was reviewed. The etiology, natural history, and treatment options for the disorder were discussed. The roles of activity medication, antiinflammatories, injections, bracing, physical therapy, and surgical interventions were all described to the patient and questions were answered. Dr. Apoorva Short explained the disease process in detail. His x-ray is benign appearing. In the absence of traumatic event and absence of any left shoulder problem prior to his stroke it is rare to have a massive rotator cuff tear. Even if he does have an underlying rotator cuff tear, surgical repair would be contraindicated as he would not be able to participate in postoperative rehab. The priority is for him to continue his stroke rehabilitation at our [place] physical therapy office. We will add shoulder capsular stretching exercises focusing on external and internal rotation of the shoulder manual therapy and cuff strengthening exercises. He will follow-up in 8 weeks to assess his shoulder range of motion progress with therapy currently his pain is more generalized and weakness is also more generalized of the left entire arm.   If and when the symptom is more focal at the left shoulder we can consider intra-articular steroid injection to help with stretching exercises Constance Blount is in agreement with this plan. All questions were answered to patient's satisfaction and was encouraged to call with any further questions. The patient was advised that NSAID-type medications have several potential side effects that include: gastrointestinal irritation including hemorrhage, renal injury, as well as an increased risk for heart attack and stroke. The patient was asked to take the medication with food and to stop if there is any symptoms of GI upset, including heartburn, nausea, increased gas or diarrhea. I asked the patient to contact their medical provider for vomiting, abdominal pain or black/bloody stools. The patient should have renal function testing per his medical provider periodically if the medication is taken on a regular basis. The patient should be alert for any swelling in the lower extremities and should stop taking the medication immediately and contact their medical provider should this occur. In addition, the patient should stop taking the medication immediately and contact their medical provider should there be any shortness of breath, fatigue and be evaluated in an emergency facility for any chest pain. The patient expresses understanding of these issues and questions were answered. Total time spent for evaluation, education, and development of treatment plan: 43 minutes. Ezekiel Falcon MD  [facility] Clinical Fellow  12/9/2022    Orders Placed This Encounter   Procedures    XR SHOULDER LEFT (MIN 2 VIEWS)     Standing Status:   Future     Number of Occurrences:   1     Standing Expiration Date:   12/9/2023     Order Specific Question:   Reason for exam:     Answer:   pain     I attest that I met personally with the patient, performed the described exam, reviewed the radiographic studies and medical records associated with this patient and supervised the services that are described above.      Tawanna Leavitt MD

## 2022-12-09 NOTE — TELEPHONE ENCOUNTER
Called to schedule psg per Daryl Ayers to pt's wife - she's having pt call us back when he's back home    n [facility]

## 2022-12-12 ENCOUNTER — HOSPITAL ENCOUNTER (OUTPATIENT)
Dept: PHYSICAL THERAPY | Age: 62
Setting detail: THERAPIES SERIES
Discharge: HOME | End: 2022-12-12
Payer: MEDICAID

## 2022-12-12 PROCEDURE — 97112 NEUROMUSCULAR REEDUCATION: CPT | Performed by: PHYSICAL THERAPIST

## 2022-12-12 PROCEDURE — 97110 THERAPEUTIC EXERCISES: CPT | Performed by: PHYSICAL THERAPIST

## 2022-12-12 PROCEDURE — 97140 MANUAL THERAPY 1/> REGIONS: CPT | Performed by: PHYSICAL THERAPIST

## 2022-12-12 NOTE — FLOWSHEET NOTE
[facility], [facility] [address], [address] [place] [address]  Phone: [phone]   Fax:     [phone]                                                     Physical Therapy Daily Treatment Note  Date:  2022    Patient Name:  Constantine Negro    :  1960  MRN: [id number]  Restrictions/Precautions:    Medical/Treatment Diagnosis Information:  Diagnosis: M54.2 (ICD-10-CM) - Neck pain  Treatment Diagnosis: F45.6  Insurance/Certification information:  PT Insurance Information: [facility] Community Plan  Physician Information:  Dr. Anabella Prather  Has the plan of care been signed (Y/N):        []  Yes  [x]  No     Date of Patient follow up with Physician:       Is this a Progress Report:     []  Yes  [x]  No        If Yes:  Date Range for reporting period:  Adxnjwgnu34/8  Ending    Progress report will be due (10 Rx or 30 days whichever is less):        Recertification will be due (POC Duration  / 90 days whichever is less):          Visit # Insurance Allowable Auth Required   2     Auth req [x]  Yes []  No        Functional Scale: FOTO 48    Date assessed:       Latex Allergy:  [x]NO      []YES  Preferred Language for Healthcare:   [x]English       []other:      Pain level:  2/10     SUBJECTIVE:  No new issues.      OBJECTIVE: See eval  Observation:   Test measurements:      RESTRICTIONS/PRECAUTIONS:     Exercises/Interventions:   Exercise/Equipment Resistance/Repetitions Other comments   Stretching/PROM     CROM     Chin tuck Supine 10x10\"    UT side bend stretch     Levater scap stretch     Scalene stretch     Pectoral stretch     Pulleys  10x10\" flexion, scaption   Added         Isometrics     Retraction 3x10    Shrugs w/ bkwd roll 3x10    Cervical Flexion      Cervical Extension     Cervical sidebending     Gripping  2 min red gripper  Added         PRE's     External Rotation Side lying 2x10  Added  Internal Rotation     Serratus     Biceps     Triceps     Shrugs     Horizontal Abd with ER     Reverse Flys     EXT     Flexion     Abduction          Cable Column/Theraband     Scapular Retraction 2x10 green Added 12/12   External Rotation 2x10 red (left) green (right)     Internal Rotation 2x10 green Added 12/12   Ext     TRIC     Lats     Shrugs     Flex     BIC     PNF     No money 3x10               Manual intervention Suboccipital release, cervical distraction, pec stretch 8'             Therapeutic Exercise and NMR EXR  [x] (16836) Provided verbal/tactile cueing for activities related to strengthening, flexibility, endurance, ROM  for improvements in cervical, postural, scapular, scapulothoracic and UE control with self care, reaching, carrying, lifting, house/yardwork, driving/computer work.    [] (05884) Provided verbal/tactile cueing for activities related to improving balance, coordination, kinesthetic sense, posture, motor skill, proprioception  to assist with cervical, scapular, scapulothoracic and UE control with self care, reaching, carrying, lifting, house/yardwork, driving/computer work. Therapeutic Activities:    [x] (46046 or 37659) Provided verbal/tactile cueing for activities related to improving balance, coordination, kinesthetic sense, posture, motor skill, proprioception and motor activation to allow for proper function of cervical, scapular, scapulothoracic and UE control with self care, carrying, lifting, driving/computer work.      Home Exercise Program:    [x] (38208) Reviewed/Progressed HEP activities related to strengthening, flexibility, endurance, ROM of cervical, scapular, scapulothoracic and UE control with self care, reaching, carrying, lifting, house/yardwork, driving/computer work  [] (01453) Reviewed/Progressed HEP activities related to improving balance, coordination, kinesthetic sense, posture, motor skill, proprioception of cervical, scapular, scapulothoracic and UE control with self care, reaching, carrying, lifting, house/yardwork, driving/computer work      Manual Treatments:  PROM / STM / Oscillations-Mobs:  G-I, II, III, IV (PA's, Inf., Post.)  [x] (24275) Provided manual therapy to mobilize soft tissue/joints of cervical/CT, scapular GHJ and UE for the purpose of decreasing headache, modulating pain, promoting relaxation,  increasing ROM, reducing/eliminating soft tissue swelling/inflammation/restriction, improving soft tissue extensibility and allowing for proper ROM for normal function with self care, reaching, carrying, lifting, house/yardwork, driving/computer work    Modalities:      Charges:  Timed Code Treatment Minutes: 50   Total Treatment Minutes: 9:17-10:07       [] EVAL (LOW) 89239 (typically 20 minutes face-to-face)  [] EVAL (MOD) 82063 (typically 30 minutes face-to-face)  [] EVAL (HIGH) 27781 (typically 45 minutes face-to-face)  [] RE-EVAL     [x] XV(90549) x 1    [] IONTO  [x] NMR (52160) x 1     [] VASO  [x] Manual (73883) x 1     [] Other:  [] TA x      [] Mech Traction (13857)  [] ES(attended) (93427)      [] ES (un) (80308):     HEP instruction:   Access Code: [email or web address]  URL: [email or web address]  Date: 12/08/2022  Prepared by: Philly Elizabeth    Exercises  Putty Squeezes - 2-3 x daily - 7 x weekly - 1 sets - 30 reps  Seated Scapular Retraction - 2-3 x daily - 7 x weekly - 3 sets - 10 reps  Seated Shoulder Shrug Circles AROM Backward - 2-3 x daily - 7 x weekly - 3 sets - 10 reps  Supine Passive Cervical Retraction - 2-3 x daily - 7 x weekly - 1 sets - 10 reps - 10 sec hold  Shoulder External Rotation and Scapular Retraction - 2-3 x daily - 7 x weekly - 3 sets - 10 reps      GOALS:   Patient stated goal: Get rid of pain and gain normal function    [] Progressing: [] Met: [] Not Met: [] Adjusted    Therapist goals for Patient:   Short Term Goals: To be achieved in: 2 weeks  1.  Independent in HEP and progression per patient tolerance, in order to prevent re-injury. [] Progressing: [] Met: [] Not Met: [] Adjusted   2. Patient will have a decrease in pain to facilitate improvement in movement, function, and ADLs as indicated by Functional Deficits. [] Progressing: [] Met: [] Not Met: [] Adjusted    Long Term Goals: To be achieved in: 8-10 weeks  1. Improve FOTO score to at least 66 to assist with reaching prior level of function. [] Progressing: [] Met: [] Not Met: [] Adjusted  2. Patient will demonstrate increased AROM to WNL of cervical/thoracic spine and L UE ROM to allow for proper joint functioning as indicated by patients Functional Deficits. [] Progressing: [] Met: [] Not Met: [] Adjusted  Patient will demonstrate an increase in postural awareness and control and activation of  Deep cervical stabilizers to allow for proper functional mobility as indicated by patients Functional Deficits. [] Progressing: [] Met: [] Not Met: [] Adjusted  4. Patient will return to daily functional activities without increased symptoms or restriction. [] Progressing: [] Met: [] Not Met: [] Adjusted  5. Patient will be able to hunt without increased symptoms or restriction. [] Progressing: [] Met: [] Not Met: [] Adjusted    Overall Progression Towards Functional goals/ Treatment Progress Update:  [] Patient is progressing as expected towards functional goals listed. [] Progression is slowed due to complexities/Impairments listed. [] Progression has been slowed due to co-morbidities.   [x] Plan just implemented, too soon to assess goals progression <30days   [] Goals require adjustment due to lack of progress  [] Patient is not progressing as expected and requires additional follow up with physician  [] Other    Prognosis for POC: [x] Good [] Fair  [] Poor      Patient requires continued skilled intervention: [x] Yes  [] No    Treatment/Activity Tolerance:  [x] Patient able to complete treatment  [] Patient limited by fatigue  [] Patient limited by pain     [] Patient limited by other medical complications  [x] Other: Pt required frequent verbal and tactile cues needed during session to improve quality of movements and improve postural awareness. Stressed the importance of performing exercises with controlled motion and taking adequate rest breaks. 12/12                  Patient Education:  12/8 Patient education on PT and plan of care including diagnosis, prognosis, treatment goals and options. Patient agrees with discussed POC and treatment and is aware of rehab process. Pt was also educated on clinic layout and use of modalities. PLAN: See eval  [x] Continue per plan of care [] Alter current plan (see comments above)  [] Plan of care initiated [] Hold pending MD visit [] Discharge      Electronically signed by:  Avery Ricketts PT    Note: If patient does not return for scheduled/ recommended follow up visits, this note will serve as a discharge from care along with most recent update on progress.

## 2022-12-14 ENCOUNTER — HOSPITAL ENCOUNTER (OUTPATIENT)
Dept: PHYSICAL THERAPY | Age: 62
Setting detail: THERAPIES SERIES
Discharge: HOME | End: 2022-12-14
Payer: MEDICAID

## 2022-12-14 DIAGNOSIS — M25.512 LEFT SHOULDER PAIN, UNSPECIFIED CHRONICITY: Primary | ICD-10-CM

## 2022-12-14 PROCEDURE — MISCPULLYB&C PULLY'S B&C: Performed by: ORTHOPAEDIC SURGERY

## 2022-12-14 PROCEDURE — 97112 NEUROMUSCULAR REEDUCATION: CPT | Performed by: PHYSICAL THERAPIST

## 2022-12-14 PROCEDURE — 97140 MANUAL THERAPY 1/> REGIONS: CPT | Performed by: PHYSICAL THERAPIST

## 2022-12-14 PROCEDURE — 97110 THERAPEUTIC EXERCISES: CPT | Performed by: PHYSICAL THERAPIST

## 2022-12-14 NOTE — FLOWSHEET NOTE
[facility],  [address]  Phone: [phone]   Fax:     [phone]                                                     Physical Therapy Daily Treatment Note  Date:  2022    Patient Name:  Jaskaran Diop    :  1960  MRN: [id number]  Restrictions/Precautions:    Medical/Treatment Diagnosis Information:  Diagnosis: M54.2 (ICD-10-CM) - Neck pain  Treatment Diagnosis: T22.8  Insurance/Certification information:  PT Insurance Information: [facility] Community Plan  Physician Information:  Dr. Severo Minium  Has the plan of care been signed (Y/N):        []  Yes  [x]  No     Date of Patient follow up with Physician:       Is this a Progress Report:     []  Yes  [x]  No        If Yes:  Date Range for reporting period:  Dimanpolv35/8  Ending    Progress report will be due (10 Rx or 30 days whichever is less):        Recertification will be due (POC Duration  / 90 days whichever is less):          Visit # Insurance Allowable Auth Required   3     Auth req [x]  Yes []  No        Functional Scale: FOTO 53    Date assessed:       Latex Allergy:  [x]NO      []YES  Preferred Language for Healthcare:   [x]English       []other:      Pain level:  2/10     SUBJECTIVE:  Felt good after last visit, but had pain when pt did exercises at home.      OBJECTIVE: See eval  Observation:   Test measurements:      RESTRICTIONS/PRECAUTIONS:     Exercises/Interventions:   Exercise/Equipment Resistance/Repetitions Other comments   Stretching/PROM     CROM     Chin tuck Supine 10x10\"    UT side bend stretch     Levater scap stretch     Scalene stretch     Pectoral stretch     Pulleys  10x10\" flexion, scaption   Added         Isometrics     Retraction 3x10    Shrugs w/ bkwd roll 3x10    Cervical Flexion      Cervical Extension     Cervical sidebending     Gripping  2 min red gripper  Added         PRE's External Rotation Side lying 3x10  ^ 12/14   Internal Rotation     Serratus     Biceps     Triceps     Shrugs     Horizontal Abd with ER     Reverse Flys     EXT     Flexion     Abduction          Cable Column/Theraband     Scapular Retraction 2x10 green Added 12/12   External Rotation 2x10 red (left) green (right)     Internal Rotation 2x10 green Added 12/12   Ext     TRIC     Lats     Shrugs     Flex     BIC     PNF     No money 3x10               Manual intervention Suboccipital release, cervical distraction, pec stretch 8'             Therapeutic Exercise and NMR EXR  [x] (39481) Provided verbal/tactile cueing for activities related to strengthening, flexibility, endurance, ROM  for improvements in cervical, postural, scapular, scapulothoracic and UE control with self care, reaching, carrying, lifting, house/yardwork, driving/computer work.    [] (60311) Provided verbal/tactile cueing for activities related to improving balance, coordination, kinesthetic sense, posture, motor skill, proprioception  to assist with cervical, scapular, scapulothoracic and UE control with self care, reaching, carrying, lifting, house/yardwork, driving/computer work. Therapeutic Activities:    [x] (68331 or 85233) Provided verbal/tactile cueing for activities related to improving balance, coordination, kinesthetic sense, posture, motor skill, proprioception and motor activation to allow for proper function of cervical, scapular, scapulothoracic and UE control with self care, carrying, lifting, driving/computer work.      Home Exercise Program:    [x] (12538) Reviewed/Progressed HEP activities related to strengthening, flexibility, endurance, ROM of cervical, scapular, scapulothoracic and UE control with self care, reaching, carrying, lifting, house/yardwork, driving/computer work  [] (15208) Reviewed/Progressed HEP activities related to improving balance, coordination, kinesthetic sense, posture, motor skill, proprioception of cervical, scapular, scapulothoracic and UE control with self care, reaching, carrying, lifting, house/yardwork, driving/computer work      Manual Treatments:  PROM / STM / Oscillations-Mobs:  G-I, II, III, IV (PA's, Inf., Post.)  [x] (55292) Provided manual therapy to mobilize soft tissue/joints of cervical/CT, scapular GHJ and UE for the purpose of decreasing headache, modulating pain, promoting relaxation,  increasing ROM, reducing/eliminating soft tissue swelling/inflammation/restriction, improving soft tissue extensibility and allowing for proper ROM for normal function with self care, reaching, carrying, lifting, house/yardwork, driving/computer work    Modalities:      Charges:  Timed Code Treatment Minutes: 50   Total Treatment Minutes: 11:26-12:19       [] EVAL (LOW) 63244 (typically 20 minutes face-to-face)  [] EVAL (MOD) 21255 (typically 30 minutes face-to-face)  [] EVAL (HIGH) 55027 (typically 45 minutes face-to-face)  [] RE-EVAL     [x] OP(06532) x 1    [] IONTO  [x] NMR (01217) x 1     [] VASO  [x] Manual (02624) x 1     [] Other:  [] TA x      [] Mech Traction (75146)  [] ES(attended) (06097)      [] ES (un) (51376):     HEP instruction:   Access Code: [email or web address]  URL: [email or web address]  Date: 12/08/2022  Prepared by: Shirlene Watson    Exercises  Putty Squeezes - 2-3 x daily - 7 x weekly - 1 sets - 30 reps  Seated Scapular Retraction - 2-3 x daily - 7 x weekly - 3 sets - 10 reps  Seated Shoulder Shrug Circles AROM Backward - 2-3 x daily - 7 x weekly - 3 sets - 10 reps  Supine Passive Cervical Retraction - 2-3 x daily - 7 x weekly - 1 sets - 10 reps - 10 sec hold  Shoulder External Rotation and Scapular Retraction - 2-3 x daily - 7 x weekly - 3 sets - 10 reps      GOALS:   Patient stated goal: Get rid of pain and gain normal function    [] Progressing: [] Met: [] Not Met: [] Adjusted    Therapist goals for Patient:   Short Term Goals: To be achieved in: 2 weeks  1.  Independent in HEP and progression per patient tolerance, in order to prevent re-injury. [] Progressing: [] Met: [] Not Met: [] Adjusted   2. Patient will have a decrease in pain to facilitate improvement in movement, function, and ADLs as indicated by Functional Deficits. [] Progressing: [] Met: [] Not Met: [] Adjusted    Long Term Goals: To be achieved in: 8-10 weeks  1. Improve FOTO score to at least 66 to assist with reaching prior level of function. [] Progressing: [] Met: [] Not Met: [] Adjusted  2. Patient will demonstrate increased AROM to WNL of cervical/thoracic spine and L UE ROM to allow for proper joint functioning as indicated by patients Functional Deficits. [] Progressing: [] Met: [] Not Met: [] Adjusted  Patient will demonstrate an increase in postural awareness and control and activation of  Deep cervical stabilizers to allow for proper functional mobility as indicated by patients Functional Deficits. [] Progressing: [] Met: [] Not Met: [] Adjusted  4. Patient will return to daily functional activities without increased symptoms or restriction. [] Progressing: [] Met: [] Not Met: [] Adjusted  5. Patient will be able to hunt without increased symptoms or restriction. [] Progressing: [] Met: [] Not Met: [] Adjusted    Overall Progression Towards Functional goals/ Treatment Progress Update:  [] Patient is progressing as expected towards functional goals listed. [] Progression is slowed due to complexities/Impairments listed. [] Progression has been slowed due to co-morbidities.   [x] Plan just implemented, too soon to assess goals progression <30days   [] Goals require adjustment due to lack of progress  [] Patient is not progressing as expected and requires additional follow up with physician  [] Other    Prognosis for POC: [x] Good [] Fair  [] Poor      Patient requires continued skilled intervention: [x] Yes  [] No    Treatment/Activity Tolerance:  [x] Patient able to complete treatment  [] Patient limited by fatigue  [] Patient limited by pain     [] Patient limited by other medical complications  [x] Other: Pt required frequent verbal and tactile cues needed during session to improve quality of movements and improve postural awareness. Reminded patient no to push through pain and to modify exercises if anything hurts. 12/14                  Patient Education:  12/8 Patient education on PT and plan of care including diagnosis, prognosis, treatment goals and options. Patient agrees with discussed POC and treatment and is aware of rehab process. Pt was also educated on clinic layout and use of modalities. PLAN: See eval  [x] Continue per plan of care [] Alter current plan (see comments above)  [] Plan of care initiated [] Hold pending MD visit [] Discharge      Electronically signed by:  Caity Campbell PT    Note: If patient does not return for scheduled/ recommended follow up visits, this note will serve as a discharge from care along with most recent update on progress.

## 2022-12-16 RX ORDER — ATORVASTATIN CALCIUM 80 MG/1
TABLET, FILM COATED ORAL
Qty: 30 TABLET | Refills: 5 | Status: SHIPPED | OUTPATIENT
Start: 2022-12-16

## 2022-12-16 RX ORDER — TRAZODONE HYDROCHLORIDE 100 MG/1
100 TABLET ORAL NIGHTLY
Qty: 30 TABLET | Refills: 2 | Status: SHIPPED | OUTPATIENT
Start: 2022-12-16

## 2022-12-16 RX ORDER — CLOPIDOGREL BISULFATE 75 MG/1
TABLET ORAL
Qty: 30 TABLET | Refills: 5 | Status: SHIPPED | OUTPATIENT
Start: 2022-12-16

## 2022-12-16 RX ORDER — NIFEDIPINE 30 MG/1
TABLET, EXTENDED RELEASE ORAL
Qty: 30 TABLET | Refills: 5 | Status: SHIPPED | OUTPATIENT
Start: 2022-12-16

## 2022-12-16 NOTE — TELEPHONE ENCOUNTER
Check with pt  Is he followed by Cardiology ?if yes who ?   If no cardiology I will do referral for him

## 2022-12-16 NOTE — TELEPHONE ENCOUNTER
Medication:   Requested Prescriptions     Pending Prescriptions Disp Refills    clopidogrel (PLAVIX) 75 MG tablet [Pharmacy Med Name: Clopidogrel Bisulfate 75 MG Oral Tablet] 30 tablet 0     Sig: Take 1 tablet by mouth once daily    traZODone (DESYREL) 100 MG tablet [Pharmacy Med Name: traZODone HCl 100 MG Oral Tablet] 30 tablet 0     Sig: Take 1 tablet by mouth nightly    NIFEdipine (PROCARDIA XL) 30 MG extended release tablet [Pharmacy Med Name: NIFEdipine ER Osmotic Release 30 MG Oral Tablet Extended Release 24 Hour] 30 tablet 0     Sig: Take 1 tablet by mouth once daily    atorvastatin (LIPITOR) 80 MG tablet [Pharmacy Med Name: Atorvastatin Calcium 80 MG Oral Tablet] 30 tablet 0     Sig: Take 1 tablet by mouth once daily     Last Filled:  9.27.22 9.27.22 9.27.22 9.27.22    Last appt: 12/7/2022   Next appt: Visit date not found    Last Lipid: No results found for: CHOL, TRIG, HDL, LDLCALC

## 2022-12-20 ENCOUNTER — HOSPITAL ENCOUNTER (OUTPATIENT)
Dept: PHYSICAL THERAPY | Age: 62
Setting detail: THERAPIES SERIES
Discharge: HOME | End: 2022-12-20
Payer: MEDICAID

## 2022-12-20 PROCEDURE — 97112 NEUROMUSCULAR REEDUCATION: CPT | Performed by: PHYSICAL THERAPIST

## 2022-12-20 PROCEDURE — 97110 THERAPEUTIC EXERCISES: CPT | Performed by: PHYSICAL THERAPIST

## 2022-12-20 NOTE — FLOWSHEET NOTE
[address] [facility], [facility] [address] [place] [address], [address] [place] [address]  Phone: [phone]   Fax:     [phone]                                                     Physical Therapy Daily Treatment Note  Date:  2022    Patient Name:  Miles Ibrahim    :  1960  MRN: [id number]  Restrictions/Precautions:    Medical/Treatment Diagnosis Information:  Diagnosis: M54.2 (ICD-10-CM) - Neck pain  Treatment Diagnosis: Y66.6  Insurance/Certification information:  PT Insurance Information: [facility] Community Plan  Physician Information:  Dr. Shanna Frankel  Has the plan of care been signed (Y/N):        []  Yes  [x]  No     Date of Patient follow up with Physician:       Is this a Progress Report:     []  Yes  [x]  No        If Yes:  Date Range for reporting period:  Imrrrugzv43/8  Ending    Progress report will be due (10 Rx or 30 days whichever is less):        Recertification will be due (POC Duration  / 90 days whichever is less):          Visit # Insurance Allowable Auth Required   4     16 visits - [x]  Yes []  No        Functional Scale: FOTO 53    Date assessed:       Latex Allergy:  [x]NO      []YES  Preferred Language for Healthcare:   [x]English       []other:      Pain level:  10     SUBJECTIVE:  Increased stiffness     OBJECTIVE: See eval  Observation:   Test measurements:      RESTRICTIONS/PRECAUTIONS:     Exercises/Interventions:   Exercise/Equipment Resistance/Repetitions Other comments   Stretching/PROM     CROM     Chin tuck Supine 10x10\"    UT side bend stretch     Levater scap stretch     Scalene stretch     Pectoral stretch     Pulleys  10x10\" flexion, scaption   Added         Isometrics     Retraction 3x10    Shrugs w/ bkwd roll 3x10    Cervical Flexion      Cervical Extension     Cervical sidebending     Gripping  2 min red gripper  Added         PRE's     External Rotation Side lying 3x10  ^ 12/14   Internal Rotation     Serratus     Biceps     Triceps     Shrugs     Horizontal Abd with ER     Reverse Flys     EXT     Flexion     Abduction          Cable Column/Theraband     Scapular Retraction 3x10 green Added 12/12   External Rotation 2x10 red (left) green (right)     Internal Rotation 3x10 green ^ 12/20   Ext 3x10 green  ^ 12/20   TRIC     Lats     Shrugs     Flex     BIC     PNF     No money 3x10     Supine horizontal abd  2x10 red  Added 12/20                 Therapeutic Exercise and NMR EXR  [x] (89917) Provided verbal/tactile cueing for activities related to strengthening, flexibility, endurance, ROM  for improvements in cervical, postural, scapular, scapulothoracic and UE control with self care, reaching, carrying, lifting, house/yardwork, driving/computer work.    [] (97261) Provided verbal/tactile cueing for activities related to improving balance, coordination, kinesthetic sense, posture, motor skill, proprioception  to assist with cervical, scapular, scapulothoracic and UE control with self care, reaching, carrying, lifting, house/yardwork, driving/computer work. Therapeutic Activities:    [x] (07471 or 27756) Provided verbal/tactile cueing for activities related to improving balance, coordination, kinesthetic sense, posture, motor skill, proprioception and motor activation to allow for proper function of cervical, scapular, scapulothoracic and UE control with self care, carrying, lifting, driving/computer work.      Home Exercise Program:    [x] (89372) Reviewed/Progressed HEP activities related to strengthening, flexibility, endurance, ROM of cervical, scapular, scapulothoracic and UE control with self care, reaching, carrying, lifting, house/yardwork, driving/computer work  [] (77610) Reviewed/Progressed HEP activities related to improving balance, coordination, kinesthetic sense, posture, motor skill, proprioception of cervical, scapular, scapulothoracic and UE control with self care, reaching, carrying, lifting, house/yardwork, driving/computer work      Manual Treatments:  PROM / STM / Oscillations-Mobs:  G-I, II, III, IV (PA's, Inf., Post.)  [x] (57251) Provided manual therapy to mobilize soft tissue/joints of cervical/CT, scapular GHJ and UE for the purpose of decreasing headache, modulating pain, promoting relaxation,  increasing ROM, reducing/eliminating soft tissue swelling/inflammation/restriction, improving soft tissue extensibility and allowing for proper ROM for normal function with self care, reaching, carrying, lifting, house/yardwork, driving/computer work    Modalities:      Charges:  Timed Code Treatment Minutes: 33   Total Treatment Minutes: 11:35-12:18       [] EVAL (LOW) 91227 (typically 20 minutes face-to-face)  [] EVAL (MOD) 25707 (typically 30 minutes face-to-face)  [] EVAL (HIGH) 60719 (typically 45 minutes face-to-face)  [] RE-EVAL     [x] GY(14351) x 1    [] IONTO  [x] NMR (37686) x 1     [] VASO  [] Manual (56861) x      [] Other:  [] TA x      [] Mech Traction (00014)  [] ES(attended) (01144)      [] ES (un) (32111):     HEP instruction:   Access Code: [email or web address]  URL: [email or web address]  Date: 12/08/2022  Prepared by: Andrea Sandoval    Exercises  Putty Squeezes - 2-3 x daily - 7 x weekly - 1 sets - 30 reps  Seated Scapular Retraction - 2-3 x daily - 7 x weekly - 3 sets - 10 reps  Seated Shoulder Shrug Circles AROM Backward - 2-3 x daily - 7 x weekly - 3 sets - 10 reps  Supine Passive Cervical Retraction - 2-3 x daily - 7 x weekly - 1 sets - 10 reps - 10 sec hold  Shoulder External Rotation and Scapular Retraction - 2-3 x daily - 7 x weekly - 3 sets - 10 reps      GOALS:   Patient stated goal: Get rid of pain and gain normal function    [] Progressing: [] Met: [] Not Met: [] Adjusted    Therapist goals for Patient:   Short Term Goals: To be achieved in: 2 weeks  1. Independent in HEP and progression per patient tolerance, in order to prevent re-injury.      [] other medical complications  [x] Other: Pt required frequent verbal and tactile cues needed during session to improve quality of movements and improve postural awareness. 12/20                  Patient Education:  12/8 Patient education on PT and plan of care including diagnosis, prognosis, treatment goals and options. Patient agrees with discussed POC and treatment and is aware of rehab process. Pt was also educated on clinic layout and use of modalities. PLAN: See eval  [x] Continue per plan of care [] Alter current plan (see comments above)  [] Plan of care initiated [] Hold pending MD visit [] Discharge      Electronically signed by:  Jaqueline Osman PT    Note: If patient does not return for scheduled/ recommended follow up visits, this note will serve as a discharge from care along with most recent update on progress.

## 2022-12-22 ENCOUNTER — HOSPITAL ENCOUNTER (OUTPATIENT)
Dept: PHYSICAL THERAPY | Age: 62
Setting detail: THERAPIES SERIES
Discharge: HOME | End: 2022-12-22
Payer: MEDICAID

## 2022-12-22 PROCEDURE — 97110 THERAPEUTIC EXERCISES: CPT | Performed by: PHYSICAL THERAPIST

## 2022-12-22 PROCEDURE — 97112 NEUROMUSCULAR REEDUCATION: CPT | Performed by: PHYSICAL THERAPIST

## 2022-12-22 PROCEDURE — 97140 MANUAL THERAPY 1/> REGIONS: CPT | Performed by: PHYSICAL THERAPIST

## 2022-12-22 NOTE — FLOWSHEET NOTE
[facility], [facility] [place] [facility] [address], [address] [place] [address]  Phone: [phone]   Fax:     [phone]                                                     Physical Therapy Daily Treatment Note  Date:  2022    Patient Name:  Mey Byrne    :  1960  MRN: [id number]  Restrictions/Precautions:    Medical/Treatment Diagnosis Information:  Diagnosis: M54.2 (ICD-10-CM) - Neck pain  Treatment Diagnosis: C05.7  Insurance/Certification information:  PT Insurance Information: [facility] Community Plan  Physician Information:  Dr. Yao Gray  Has the plan of care been signed (Y/N):        []  Yes  [x]  No     Date of Patient follow up with Physician:       Is this a Progress Report:     []  Yes  [x]  No        If Yes:  Date Range for reporting period:  Mnpbqhfot76/8  Ending    Progress report will be due (10 Rx or 30 days whichever is less):        Recertification will be due (POC Duration  / 90 days whichever is less):          Visit # Insurance Allowable Auth Required   5     16 visits - [x]  Yes []  No        Functional Scale: FOTO 53    Date assessed:       Latex Allergy:  [x]NO      []YES  Preferred Language for Healthcare:   [x]English       []other:      Pain level:  2/10 ()     SUBJECTIVE:  Increased stiffness, but no pain.      OBJECTIVE: See eval  Observation:   Test measurements:      RESTRICTIONS/PRECAUTIONS:     Exercises/Interventions:   Exercise/Equipment Resistance/Repetitions Other comments   Stretching/PROM     CROM     Chin tuck Supine 10x10\"    UT side bend stretch     Levater scap stretch     Scalene stretch     3 finger rotation  20x  post IASTM Added    Pulleys  10x10\" flexion, scaption   Added         Isometrics     Retraction 3x10    Shrugs w/ bkwd roll 3x10    Cervical Flexion      Cervical Extension     Cervical sidebending     Gripping  2 min red gripper  Added  PRE's     External Rotation Side lying 3x10  ^ 12/14   Internal Rotation     Serratus     Biceps     Triceps     Shrugs     Horizontal Abd with ER     Reverse Flys     EXT     Flexion     Abduction          Cable Column/Theraband     Scapular Retraction 3x10 blue ^ 12/22   External Rotation 2x10 green (left) blue (right)  ^ 12/22   Internal Rotation 3x10 blue ^ 12/22   Ext 3x10 blue  ^ 12/22   TRIC     Lats     Shrugs     Flex     BIC     PNF     No money 3x10     Supine horizontal abd  2x10 red Added 12/20       Manual intervention STM with [facility]  to UT area and cervical paraspinals          Therapeutic Exercise and NMR EXR  [x] (08077) Provided verbal/tactile cueing for activities related to strengthening, flexibility, endurance, ROM  for improvements in cervical, postural, scapular, scapulothoracic and UE control with self care, reaching, carrying, lifting, house/yardwork, driving/computer work.    [] (11159) Provided verbal/tactile cueing for activities related to improving balance, coordination, kinesthetic sense, posture, motor skill, proprioception  to assist with cervical, scapular, scapulothoracic and UE control with self care, reaching, carrying, lifting, house/yardwork, driving/computer work. Therapeutic Activities:    [x] (78697 or 90977) Provided verbal/tactile cueing for activities related to improving balance, coordination, kinesthetic sense, posture, motor skill, proprioception and motor activation to allow for proper function of cervical, scapular, scapulothoracic and UE control with self care, carrying, lifting, driving/computer work.      Home Exercise Program:    [x] (29160) Reviewed/Progressed HEP activities related to strengthening, flexibility, endurance, ROM of cervical, scapular, scapulothoracic and UE control with self care, reaching, carrying, lifting, house/yardwork, driving/computer work  [] (66544) Reviewed/Progressed HEP activities related to improving balance, coordination, kinesthetic sense, posture, motor skill, proprioception of cervical, scapular, scapulothoracic and UE control with self care, reaching, carrying, lifting, house/yardwork, driving/computer work      Manual Treatments:  PROM / STM / Oscillations-Mobs:  G-I, II, III, IV (PA's, Inf., Post.)  [x] (72587) Provided manual therapy to mobilize soft tissue/joints of cervical/CT, scapular GHJ and UE for the purpose of decreasing headache, modulating pain, promoting relaxation,  increasing ROM, reducing/eliminating soft tissue swelling/inflammation/restriction, improving soft tissue extensibility and allowing for proper ROM for normal function with self care, reaching, carrying, lifting, house/yardwork, driving/computer work    Modalities:      Charges:  Timed Code Treatment Minutes: 39   Total Treatment Minutes: 11:29-12:08       [] EVAL (LOW) 03654 (typically 20 minutes face-to-face)  [] EVAL (MOD) 46769 (typically 30 minutes face-to-face)  [] EVAL (HIGH) 47607 (typically 45 minutes face-to-face)  [] RE-EVAL     [x] ZA(62156) x 1    [] IONTO  [x] NMR (99038) x 1     [] VASO  [x] Manual (27960) x 1     [] Other:  [] TA x      [] Mech Traction (62204)  [] ES(attended) (62678)      [] ES (un) (70190):     HEP instruction:   Access Code: [email or web address]  URL: [email or web address]  Date: 12/08/2022  Prepared by: Arlene Riley    Exercises  Putty Squeezes - 2-3 x daily - 7 x weekly - 1 sets - 30 reps  Seated Scapular Retraction - 2-3 x daily - 7 x weekly - 3 sets - 10 reps  Seated Shoulder Shrug Circles AROM Backward - 2-3 x daily - 7 x weekly - 3 sets - 10 reps  Supine Passive Cervical Retraction - 2-3 x daily - 7 x weekly - 1 sets - 10 reps - 10 sec hold  Shoulder External Rotation and Scapular Retraction - 2-3 x daily - 7 x weekly - 3 sets - 10 reps      GOALS:   Patient stated goal: Get rid of pain and gain normal function    [] Progressing: [] Met: [] Not Met: [] Adjusted    Therapist goals for Patient:   Short Term Goals:  To be achieved in: 2 weeks  1. Independent in HEP and progression per patient tolerance, in order to prevent re-injury. [] Progressing: [] Met: [] Not Met: [] Adjusted   2. Patient will have a decrease in pain to facilitate improvement in movement, function, and ADLs as indicated by Functional Deficits. [] Progressing: [] Met: [] Not Met: [] Adjusted    Long Term Goals: To be achieved in: 8-10 weeks  1. Improve FOTO score to at least 66 to assist with reaching prior level of function. [] Progressing: [] Met: [] Not Met: [] Adjusted  2. Patient will demonstrate increased AROM to WNL of cervical/thoracic spine and L UE ROM to allow for proper joint functioning as indicated by patients Functional Deficits. [] Progressing: [] Met: [] Not Met: [] Adjusted  Patient will demonstrate an increase in postural awareness and control and activation of  Deep cervical stabilizers to allow for proper functional mobility as indicated by patients Functional Deficits. [] Progressing: [] Met: [] Not Met: [] Adjusted  4. Patient will return to daily functional activities without increased symptoms or restriction. [] Progressing: [] Met: [] Not Met: [] Adjusted  5. Patient will be able to hunt without increased symptoms or restriction. [] Progressing: [] Met: [] Not Met: [] Adjusted    Overall Progression Towards Functional goals/ Treatment Progress Update:  [] Patient is progressing as expected towards functional goals listed. [] Progression is slowed due to complexities/Impairments listed. [] Progression has been slowed due to co-morbidities.   [x] Plan just implemented, too soon to assess goals progression <30days   [] Goals require adjustment due to lack of progress  [] Patient is not progressing as expected and requires additional follow up with physician  [] Other    Prognosis for POC: [x] Good [] Fair  [] Poor      Patient requires continued skilled intervention: [x] Yes  [] No    Treatment/Activity Tolerance:  [x] Patient able to complete treatment  [] Patient limited by fatigue  [] Patient limited by pain     [] Patient limited by other medical complications  [x] Other: Pt required frequent verbal and tactile cues needed during session to improve quality of movements and improve postural awareness. Responded well to IASTM with red, mottling present in UT afterwards. 12/22                  Patient Education:  12/8 Patient education on PT and plan of care including diagnosis, prognosis, treatment goals and options. Patient agrees with discussed POC and treatment and is aware of rehab process. Pt was also educated on clinic layout and use of modalities. PLAN: See eval  [x] Continue per plan of care [] Alter current plan (see comments above)  [] Plan of care initiated [] Hold pending MD visit [] Discharge      Electronically signed by:  Chrystine Gottron, PT    Note: If patient does not return for scheduled/ recommended follow up visits, this note will serve as a discharge from care along with most recent update on progress.

## 2022-12-23 ASSESSMENT — ENCOUNTER SYMPTOMS
NAUSEA: 0
VOMITING: 0
ALLERGIC/IMMUNOLOGIC NEGATIVE: 1
PHOTOPHOBIA: 0
COUGH: 0
EYES NEGATIVE: 1
ABDOMINAL PAIN: 0
CHEST TIGHTNESS: 0

## 2022-12-29 ENCOUNTER — HOSPITAL ENCOUNTER (OUTPATIENT)
Dept: PHYSICAL THERAPY | Age: 62
Setting detail: THERAPIES SERIES
Discharge: HOME | End: 2022-12-29
Payer: MEDICAID

## 2022-12-29 PROCEDURE — 97112 NEUROMUSCULAR REEDUCATION: CPT | Performed by: PHYSICAL THERAPIST

## 2022-12-29 PROCEDURE — 97110 THERAPEUTIC EXERCISES: CPT | Performed by: PHYSICAL THERAPIST

## 2022-12-29 NOTE — FLOWSHEET NOTE
[facility], [facility] [employer] [address] [place] [address], [address] [place] [address]  Phone: [phone]   Fax:     [phone]                                                     Physical Therapy Daily Treatment Note  Date:  2022    Patient Name:  Araceli Levin    :  1960  MRN: [id number]  Restrictions/Precautions:    Medical/Treatment Diagnosis Information:  Diagnosis: M54.2 (ICD-10-CM) - Neck pain  Treatment Diagnosis: R38.1  Insurance/Certification information:  PT Insurance Information: [address]  Physician Information:  Dr. Veronica Schreiber  Has the plan of care been signed (Y/N):        []  Yes  [x]  No     Date of Patient follow up with Physician:       Is this a Progress Report:     []  Yes  [x]  No        If Yes:  Date Range for reporting period:  [id number]/8  Ending    Progress report will be due (10 Rx or 30 days whichever is less):        Recertification will be due (POC Duration  / 90 days whichever is less):          Visit # Insurance Allowable Auth Required   6     16 visits - [x]  Yes []  No        Functional Scale: FOTO 53    Date assessed:       Latex Allergy:  [x]NO      []YES  Preferred Language for Healthcare:   [x]English       []other:      Pain level:  0/10 ()     SUBJECTIVE:  Increased stiffness, but no pain. Inconsistent with strengthening.      OBJECTIVE: See eval  Observation:   Test measurements:      RESTRICTIONS/PRECAUTIONS:     Exercises/Interventions:   Exercise/Equipment Resistance/Repetitions Other comments   Stretching/PROM     CROM     Chin tuck Supine 10x10\"    UT side bend stretch     Levater scap stretch     Scalene stretch     3 finger rotation  20x   Added    Pulleys  10x10\" flexion, scaption   Added         Isometrics     Retraction 3x10    Shrugs w/ bkwd roll 3x10    Cervical Flexion      Cervical Extension     Cervical sidebending     Gripping  2 min red gripper  Added 12/12        PRE's     External Rotation Side lying 3x10  ^ 12/14   Internal Rotation     Serratus     Biceps     Triceps     Shrugs     Horizontal Abd with ER     Reverse Flys     EXT     Flexion     Abduction          Cable Column/Theraband     Scapular Retraction 3x10 blue ^ 12/22   External Rotation 3x10 green (left) blue (right)  ^ 12/29   Internal Rotation 3x10 blue ^ 12/22   Ext 3x10 blue  ^ 12/22   TRIC     Lats     Shrugs     Flex     BIC     PNF     No money 3x10     Supine horizontal abd  3x10 red ^ 12/29       Manual intervention Cervical traction  10' (16 lbs max, 11 lbs min) Added 12/29       Therapeutic Exercise and NMR EXR  [x] (82613) Provided verbal/tactile cueing for activities related to strengthening, flexibility, endurance, ROM  for improvements in cervical, postural, scapular, scapulothoracic and UE control with self care, reaching, carrying, lifting, house/yardwork, driving/computer work.    [] (46847) Provided verbal/tactile cueing for activities related to improving balance, coordination, kinesthetic sense, posture, motor skill, proprioception  to assist with cervical, scapular, scapulothoracic and UE control with self care, reaching, carrying, lifting, house/yardwork, driving/computer work. Therapeutic Activities:    [x] (02266 or 44838) Provided verbal/tactile cueing for activities related to improving balance, coordination, kinesthetic sense, posture, motor skill, proprioception and motor activation to allow for proper function of cervical, scapular, scapulothoracic and UE control with self care, carrying, lifting, driving/computer work.      Home Exercise Program:    [x] (53543) Reviewed/Progressed HEP activities related to strengthening, flexibility, endurance, ROM of cervical, scapular, scapulothoracic and UE control with self care, reaching, carrying, lifting, house/yardwork, driving/computer work  [] (90810) Reviewed/Progressed HEP activities related to improving balance, coordination, kinesthetic sense, posture, motor skill, proprioception of cervical, scapular, scapulothoracic and UE control with self care, reaching, carrying, lifting, house/yardwork, driving/computer work      Manual Treatments:  PROM / STM / Oscillations-Mobs:  G-I, II, III, IV (PA's, Inf., Post.)  [x] (74364) Provided manual therapy to mobilize soft tissue/joints of cervical/CT, scapular GHJ and UE for the purpose of decreasing headache, modulating pain, promoting relaxation,  increasing ROM, reducing/eliminating soft tissue swelling/inflammation/restriction, improving soft tissue extensibility and allowing for proper ROM for normal function with self care, reaching, carrying, lifting, house/yardwork, driving/computer work    Modalities:      Charges:  Timed Code Treatment Minutes: 40   Total Treatment Minutes: 9:30-10:26       [] EVAL (LOW) 16125 (typically 20 minutes face-to-face)  [] EVAL (MOD) 08060 (typically 30 minutes face-to-face)  [] EVAL (HIGH) 39131 (typically 45 minutes face-to-face)  [] RE-EVAL     [x] GH(48664) x 2    [] IONTO  [x] NMR (27342) x 1     [] VASO  [] Manual (71173) x      [] Other:  [] TA x      [x] Mech Traction (10895)  [] ES(attended) (76252)      [] ES (un) (16177):     HEP instruction:   Access Code: [email or web address]  URL: [email or web address]  Date: 12/08/2022  Prepared by: Cheryl Kong    Exercises  Putty Squeezes - 2-3 x daily - 7 x weekly - 1 sets - 30 reps  Seated Scapular Retraction - 2-3 x daily - 7 x weekly - 3 sets - 10 reps  Seated Shoulder Shrug Circles AROM Backward - 2-3 x daily - 7 x weekly - 3 sets - 10 reps  Supine Passive Cervical Retraction - 2-3 x daily - 7 x weekly - 1 sets - 10 reps - 10 sec hold  Shoulder External Rotation and Scapular Retraction - 2-3 x daily - 7 x weekly - 3 sets - 10 reps      GOALS:   Patient stated goal: Get rid of pain and gain normal function    [] Progressing: [] Met: [] Not Met: [] Adjusted    Therapist goals for Patient: Short Term Goals: To be achieved in: 2 weeks  1. Independent in HEP and progression per patient tolerance, in order to prevent re-injury. [] Progressing: [] Met: [] Not Met: [] Adjusted   2. Patient will have a decrease in pain to facilitate improvement in movement, function, and ADLs as indicated by Functional Deficits. [] Progressing: [] Met: [] Not Met: [] Adjusted    Long Term Goals: To be achieved in: 8-10 weeks  1. Improve FOTO score to at least 66 to assist with reaching prior level of function. [] Progressing: [] Met: [] Not Met: [] Adjusted  2. Patient will demonstrate increased AROM to WNL of cervical/thoracic spine and L UE ROM to allow for proper joint functioning as indicated by patients Functional Deficits. [] Progressing: [] Met: [] Not Met: [] Adjusted  Patient will demonstrate an increase in postural awareness and control and activation of  Deep cervical stabilizers to allow for proper functional mobility as indicated by patients Functional Deficits. [] Progressing: [] Met: [] Not Met: [] Adjusted  4. Patient will return to daily functional activities without increased symptoms or restriction. [] Progressing: [] Met: [] Not Met: [] Adjusted  5. Patient will be able to hunt without increased symptoms or restriction. [] Progressing: [] Met: [] Not Met: [] Adjusted    Overall Progression Towards Functional goals/ Treatment Progress Update:  [] Patient is progressing as expected towards functional goals listed. [] Progression is slowed due to complexities/Impairments listed. [] Progression has been slowed due to co-morbidities.   [x] Plan just implemented, too soon to assess goals progression <30days   [] Goals require adjustment due to lack of progress  [] Patient is not progressing as expected and requires additional follow up with physician  [] Other    Prognosis for POC: [x] Good [] Fair  [] Poor      Patient requires continued skilled intervention: [x] Yes  [] No    Treatment/Activity Tolerance:  [x] Patient able to complete treatment  [] Patient limited by fatigue  [] Patient limited by pain     [] Patient limited by other medical complications  [x] Other: Pt required frequent verbal and tactile cues needed during session to improve quality of movements and improve postural awareness. Introduced mechanical traction which he tolerated well.  12/29                  Patient Education:  12/8 Patient education on PT and plan of care including diagnosis, prognosis, treatment goals and options. Patient agrees with discussed POC and treatment and is aware of rehab process. Pt was also educated on clinic layout and use of modalities. 12/29 Discussed importance of doing HEP in order to see improvements         PLAN: See eval  [x] Continue per plan of care [] Alter current plan (see comments above)  [] Plan of care initiated [] Hold pending MD visit [] Discharge      Electronically signed by:  Julio Villalba PT    Note: If patient does not return for scheduled/ recommended follow up visits, this note will serve as a discharge from care along with most recent update on progress.

## 2023-01-03 ENCOUNTER — HOSPITAL ENCOUNTER (OUTPATIENT)
Dept: SLEEP CENTER | Age: 63
Discharge: HOME | End: 2023-01-03
Payer: MEDICAID

## 2023-01-03 DIAGNOSIS — G47.10 HYPERSOMNIA: ICD-10-CM

## 2023-01-03 DIAGNOSIS — R06.83 SNORING: ICD-10-CM

## 2023-01-04 ENCOUNTER — HOSPITAL ENCOUNTER (OUTPATIENT)
Dept: PHYSICAL THERAPY | Age: 63
Setting detail: THERAPIES SERIES
Discharge: HOME | End: 2023-01-04
Payer: MEDICAID

## 2023-01-04 PROCEDURE — 95810 POLYSOM 6/> YRS 4/> PARAM: CPT | Performed by: INTERNAL MEDICINE

## 2023-01-04 NOTE — FLOWSHEET NOTE
Physical Therapy  Cancellation/No-show Note  Patient Name:  Claudia Woods  :  1960   Date:  2023  Cancelled visits to date: 01  No-shows to date: 0    For today's appointment patient:  [x]  Cancelled  [x]  Rescheduled appointment  []  No-show     Reason given by patient:  []  Patient ill  []  Conflicting appointment  []  No transportation    []  Conflict with work  []  No reason given  []  Other:     Comments:  showed up late for appt    Electronically signed by:  Norma Light, PT, DPT

## 2023-01-06 ENCOUNTER — HOSPITAL ENCOUNTER (OUTPATIENT)
Dept: PHYSICAL THERAPY | Age: 63
Setting detail: THERAPIES SERIES
Discharge: HOME | End: 2023-01-06
Payer: MEDICAID

## 2023-01-06 PROCEDURE — 97110 THERAPEUTIC EXERCISES: CPT | Performed by: PHYSICAL THERAPIST

## 2023-01-06 PROCEDURE — 97112 NEUROMUSCULAR REEDUCATION: CPT | Performed by: PHYSICAL THERAPIST

## 2023-01-06 PROCEDURE — 97012 MECHANICAL TRACTION THERAPY: CPT | Performed by: PHYSICAL THERAPIST

## 2023-01-06 NOTE — FLOWSHEET NOTE
[facility], [facility] [address], [address] [place] [address]  Phone: [phone]   Fax:     [phone]                                                     Physical Therapy Daily Treatment Note  Date:  2023    Patient Name:  Stacey Carroll    :  1960  MRN: [id number]  Restrictions/Precautions:    Medical/Treatment Diagnosis Information:  Diagnosis: M54.2 (ICD-10-CM) - Neck pain  Treatment Diagnosis: W16.4  Insurance/Certification information:  PT Insurance Information: [address] [facility]  Physician Information:  Dr. Rhonda Law  Has the plan of care been signed (Y/N):        []  Yes  [x]  No     Date of Patient follow up with Physician:       Is this a Progress Report:     []  Yes  [x]  No        If Yes:  Date Range for reporting period:  [id number]/8  Ending    Progress report will be due (10 Rx or 30 days whichever is less):        Recertification will be due (POC Duration  / 90 days whichever is less):          Visit # Insurance Allowable Auth Required   6     16 visits - [x]  Yes []  No        Functional Scale: FOTO 53    Date assessed:       Latex Allergy:  [x]NO      []YES  Preferred Language for Healthcare:   [x]English       []other:      Pain level:  0/10 ()     SUBJECTIVE:  Pt reports he has been doing the exercises several times per day     OBJECTIVE: See eval  Observation:   Test measurements:      RESTRICTIONS/PRECAUTIONS:     Exercises/Interventions:   Exercise/Equipment Resistance/Repetitions Other comments   Stretching/PROM     AAROM flexion supine with cane  3x10  Added    Chin tuck Supine 10x10\"    UT side bend stretch     Levater scap stretch     Scalene stretch     3 finger rotation  20x   Added    Pulleys  10x10\" flexion, scaption   Added         Isometrics     Retraction 3x10    Shrugs w/ bkwd roll 3x10    Cervical Flexion      Cervical Extension     Cervical sidebending          PRE's     External Rotation Side lying 3x10 1# ^ 1/6   Internal Rotation     Serratus     Biceps     Triceps     Shrugs     Horizontal Abd with ER     Reverse Flys     EXT     Flexion     Abduction          Cable Column/Theraband     Scapular Retraction 3x10 blk ^ 1/6   External Rotation 3x10 green (left) blue (right)  ^ 12/29   Internal Rotation 3x10 blk ^ 1/6   Ext 3x10 blue  ^ 12/22   TRIC     Lats     Shrugs     Flex     BIC     PNF     No money 3x10         Manual intervention Cervical traction  12' (18 lbs max, 13 lbs min) 1/6       Therapeutic Exercise and NMR EXR  [x] (24482) Provided verbal/tactile cueing for activities related to strengthening, flexibility, endurance, ROM  for improvements in cervical, postural, scapular, scapulothoracic and UE control with self care, reaching, carrying, lifting, house/yardwork, driving/computer work.    [] (12577) Provided verbal/tactile cueing for activities related to improving balance, coordination, kinesthetic sense, posture, motor skill, proprioception  to assist with cervical, scapular, scapulothoracic and UE control with self care, reaching, carrying, lifting, house/yardwork, driving/computer work. Therapeutic Activities:    [x] (93331 or 57223) Provided verbal/tactile cueing for activities related to improving balance, coordination, kinesthetic sense, posture, motor skill, proprioception and motor activation to allow for proper function of cervical, scapular, scapulothoracic and UE control with self care, carrying, lifting, driving/computer work.      Home Exercise Program:    [x] (48053) Reviewed/Progressed HEP activities related to strengthening, flexibility, endurance, ROM of cervical, scapular, scapulothoracic and UE control with self care, reaching, carrying, lifting, house/yardwork, driving/computer work  [] (44894) Reviewed/Progressed HEP activities related to improving balance, coordination, kinesthetic sense, posture, motor skill, proprioception of cervical, scapular, scapulothoracic and UE control with self care, reaching, carrying, lifting, house/yardwork, driving/computer work      Manual Treatments:  PROM / STM / Oscillations-Mobs:  G-I, II, III, IV (PA's, Inf., Post.)  [x] (02983) Provided manual therapy to mobilize soft tissue/joints of cervical/CT, scapular GHJ and UE for the purpose of decreasing headache, modulating pain, promoting relaxation,  increasing ROM, reducing/eliminating soft tissue swelling/inflammation/restriction, improving soft tissue extensibility and allowing for proper ROM for normal function with self care, reaching, carrying, lifting, house/yardwork, driving/computer work    Modalities:      Charges:  Timed Code Treatment Minutes: 34   Total Treatment Minutes: 11:27-12:15       [] EVAL (LOW) 52379 (typically 20 minutes face-to-face)  [] EVAL (MOD) 77371 (typically 30 minutes face-to-face)  [] EVAL (HIGH) 43508 (typically 45 minutes face-to-face)  [] RE-EVAL     [x] DI(64850) x 1    [] IONTO  [x] NMR (58327) x 1     [] VASO  [] Manual (51463) x      [] Other:  [] TA x      [x] Mech Traction (60577)  [] ES(attended) (62149)      [] ES (un) (69488):     HEP instruction:   Access Code: [email or web address]  URL: [email or web address]  Date: 12/08/2022  Prepared by: Vicki Vegas    Exercises  Putty Squeezes - 2-3 x daily - 7 x weekly - 1 sets - 30 reps  Seated Scapular Retraction - 2-3 x daily - 7 x weekly - 3 sets - 10 reps  Seated Shoulder Shrug Circles AROM Backward - 2-3 x daily - 7 x weekly - 3 sets - 10 reps  Supine Passive Cervical Retraction - 2-3 x daily - 7 x weekly - 1 sets - 10 reps - 10 sec hold  Shoulder External Rotation and Scapular Retraction - 2-3 x daily - 7 x weekly - 3 sets - 10 reps      GOALS:   Patient stated goal: Get rid of pain and gain normal function    [] Progressing: [] Met: [] Not Met: [] Adjusted    Therapist goals for Patient:   Short Term Goals: To be achieved in: 2 weeks  1.  Independent in HEP and progression per patient tolerance, in order to prevent re-injury. [] Progressing: [] Met: [] Not Met: [] Adjusted   2. Patient will have a decrease in pain to facilitate improvement in movement, function, and ADLs as indicated by Functional Deficits. [] Progressing: [] Met: [] Not Met: [] Adjusted    Long Term Goals: To be achieved in: 8-10 weeks  1. Improve FOTO score to at least 66 to assist with reaching prior level of function. [] Progressing: [] Met: [] Not Met: [] Adjusted  2. Patient will demonstrate increased AROM to WNL of cervical/thoracic spine and L UE ROM to allow for proper joint functioning as indicated by patients Functional Deficits. [] Progressing: [] Met: [] Not Met: [] Adjusted  Patient will demonstrate an increase in postural awareness and control and activation of  Deep cervical stabilizers to allow for proper functional mobility as indicated by patients Functional Deficits. [] Progressing: [] Met: [] Not Met: [] Adjusted  4. Patient will return to daily functional activities without increased symptoms or restriction. [] Progressing: [] Met: [] Not Met: [] Adjusted  5. Patient will be able to hunt without increased symptoms or restriction. [] Progressing: [] Met: [] Not Met: [] Adjusted    Overall Progression Towards Functional goals/ Treatment Progress Update:  [] Patient is progressing as expected towards functional goals listed. [] Progression is slowed due to complexities/Impairments listed. [] Progression has been slowed due to co-morbidities.   [x] Plan just implemented, too soon to assess goals progression <30days   [] Goals require adjustment due to lack of progress  [] Patient is not progressing as expected and requires additional follow up with physician  [] Other    Prognosis for POC: [x] Good [] Fair  [] Poor      Patient requires continued skilled intervention: [x] Yes  [] No    Treatment/Activity Tolerance:  [x] Patient able to complete treatment  [] Patient limited by fatigue  [] Patient limited by pain     [] Patient limited by other medical complications  [x] Other: Pt required frequent verbal and tactile cues needed during session to improve quality of movements and improve postural awareness. Needed to remind him to perform all exercises slow and controlled 1/6                  Patient Education:  12/8 Patient education on PT and plan of care including diagnosis, prognosis, treatment goals and options. Patient agrees with discussed POC and treatment and is aware of rehab process. Pt was also educated on clinic layout and use of modalities. 12/29 Discussed importance of doing HEP in order to see improvements         PLAN: See eval  [x] Continue per plan of care [] Alter current plan (see comments above)  [] Plan of care initiated [] Hold pending MD visit [] Discharge      Electronically signed by:  Quan Rosenthal PT    Note: If patient does not return for scheduled/ recommended follow up visits, this note will serve as a discharge from care along with most recent update on progress.

## 2023-01-10 ENCOUNTER — TELEPHONE (OUTPATIENT)
Dept: PULMONOLOGY | Age: 63
End: 2023-01-10

## 2023-01-10 ENCOUNTER — TELEPHONE (OUTPATIENT)
Dept: ORTHOPEDIC SURGERY | Age: 63
End: 2023-01-10

## 2023-01-10 NOTE — TELEPHONE ENCOUNTER
General Question     Subject: patient call and would like to know if he can get some more PT Time in for his shoulder. He just need a call back,.    Patient: Yudith Spencer  Contact Number: '[phone]

## 2023-01-13 ENCOUNTER — HOSPITAL ENCOUNTER (OUTPATIENT)
Dept: PHYSICAL THERAPY | Age: 63
Setting detail: THERAPIES SERIES
Discharge: HOME | End: 2023-01-13
Payer: MEDICAID

## 2023-01-13 PROCEDURE — 97012 MECHANICAL TRACTION THERAPY: CPT | Performed by: PHYSICAL THERAPIST

## 2023-01-13 PROCEDURE — 97110 THERAPEUTIC EXERCISES: CPT | Performed by: PHYSICAL THERAPIST

## 2023-01-13 PROCEDURE — 97112 NEUROMUSCULAR REEDUCATION: CPT | Performed by: PHYSICAL THERAPIST

## 2023-01-13 NOTE — FLOWSHEET NOTE
[facility],  [address]  Phone: [phone]   Fax:     [phone]                                                     Physical Therapy Daily Treatment Note  Date:  2023    Patient Name:  Jaskaran Diop    :  1960  MRN: [id number]  Restrictions/Precautions:    Medical/Treatment Diagnosis Information:  Diagnosis: M54.2 (ICD-10-CM) - Neck pain  Treatment Diagnosis: S40.2  Insurance/Certification information:  PT Insurance Information: [facility] Community Plan  Physician Information:  Dr. Severo Minium  Has the plan of care been signed (Y/N):        [x]  Yes  []  No     Date of Patient follow up with Physician:       Is this a Progress Report:     []  Yes  [x]  No        If Yes:  Date Range for reporting period:  [id number]/8  Ending    Progress report will be due (10 Rx or 30 days whichever is less):  NPV      Recertification will be due (POC Duration  / 90 days whichever is less):  NPV        Visit # Insurance Allowable Auth Required   7     16 visits - [x]  Yes []  No        Functional Scale: FOTO 53    Date assessed:       Latex Allergy:  [x]NO      []YES  Preferred Language for Healthcare:   [x]English       []other:      Pain level:  0/10 ()     SUBJECTIVE:  Pt states his shoulder is still sore. Pt states he fell while hunting yesterday.       OBJECTIVE: See eval  Observation:   Test measurements:      RESTRICTIONS/PRECAUTIONS:     Exercises/Interventions:   Exercise/Equipment Resistance/Repetitions Other comments   Stretching/PROM     AAROM flexion supine with cane  3x10  Added    Chin tuck Supine 10x10\"    UT side bend stretch     Levater scap stretch     Scalene stretch     3 finger rotation  20x   Added    Pulleys  10x10\" flexion, scaption   Added         Isometrics     Retraction 3x10    Shrugs w/ bkwd roll 3x10    Cervical Flexion      Cervical Extension     Cervical sidebending          PRE's     External Rotation Side lying 3x10 1# ^ 1/6   Internal Rotation     Serratus     Biceps     Triceps     Shrugs     Horizontal Abd with ER     Reverse Flys     EXT     Flexion     Abduction          Cable Column/Theraband     Scapular Retraction 3x10 blk ^ 1/6   External Rotation 3x10 blue ^ 1/6   Internal Rotation 3x10 blk ^ 1/6   Ext 3x10 blue  ^ 12/22   TRIC     Lats     Shrugs     Flex     BIC     PNF     No money 3x10         Manual intervention Cervical traction  12' (18 lbs max, 13 lbs min) 1/6       Therapeutic Exercise and NMR EXR  [x] (69836) Provided verbal/tactile cueing for activities related to strengthening, flexibility, endurance, ROM  for improvements in cervical, postural, scapular, scapulothoracic and UE control with self care, reaching, carrying, lifting, house/yardwork, driving/computer work.    [] (85020) Provided verbal/tactile cueing for activities related to improving balance, coordination, kinesthetic sense, posture, motor skill, proprioception  to assist with cervical, scapular, scapulothoracic and UE control with self care, reaching, carrying, lifting, house/yardwork, driving/computer work. Therapeutic Activities:    [x] (46864 or 87410) Provided verbal/tactile cueing for activities related to improving balance, coordination, kinesthetic sense, posture, motor skill, proprioception and motor activation to allow for proper function of cervical, scapular, scapulothoracic and UE control with self care, carrying, lifting, driving/computer work.      Home Exercise Program:    [x] (09775) Reviewed/Progressed HEP activities related to strengthening, flexibility, endurance, ROM of cervical, scapular, scapulothoracic and UE control with self care, reaching, carrying, lifting, house/yardwork, driving/computer work  [] (04996) Reviewed/Progressed HEP activities related to improving balance, coordination, kinesthetic sense, posture, motor skill, proprioception of cervical, scapular, scapulothoracic and UE control with self care, reaching, carrying, lifting, house/yardwork, driving/computer work      Manual Treatments:  PROM / STM / Oscillations-Mobs:  G-I, II, III, IV (PA's, Inf., Post.)  [x] (81667) Provided manual therapy to mobilize soft tissue/joints of cervical/CT, scapular GHJ and UE for the purpose of decreasing headache, modulating pain, promoting relaxation,  increasing ROM, reducing/eliminating soft tissue swelling/inflammation/restriction, improving soft tissue extensibility and allowing for proper ROM for normal function with self care, reaching, carrying, lifting, house/yardwork, driving/computer work    Modalities:      Charges:  Timed Code Treatment Minutes: 25   Total Treatment Minutes: 10:45-11:35 (50)       [] EVAL (LOW) 23742 (typically 20 minutes face-to-face)  [] EVAL (MOD) 58292 (typically 30 minutes face-to-face)  [] EVAL (HIGH) 99267 (typically 45 minutes face-to-face)  [] RE-EVAL     [x] YJ(22309) x 1    [] IONTO  [x] NMR (46664) x 1     [] VASO  [] Manual (13516) x      [] Other:  [] TA x      [x] Mech Traction (62974)  [] ES(attended) (48244)      [] ES (un) (79103):     HEP instruction:   Access Code: [email or web address]  URL: [email or web address]  Date: 12/08/2022  Prepared by: Eliseo Helms    Exercises  Putty Squeezes - 2-3 x daily - 7 x weekly - 1 sets - 30 reps  Seated Scapular Retraction - 2-3 x daily - 7 x weekly - 3 sets - 10 reps  Seated Shoulder Shrug Circles AROM Backward - 2-3 x daily - 7 x weekly - 3 sets - 10 reps  Supine Passive Cervical Retraction - 2-3 x daily - 7 x weekly - 1 sets - 10 reps - 10 sec hold  Shoulder External Rotation and Scapular Retraction - 2-3 x daily - 7 x weekly - 3 sets - 10 reps      GOALS:   Patient stated goal: Get rid of pain and gain normal function    [] Progressing: [] Met: [] Not Met: [] Adjusted    Therapist goals for Patient:   Short Term Goals: To be achieved in: 2 weeks  1. Independent in HEP and progression per patient tolerance, in order to prevent re-injury. [] Progressing: [] Met: [] Not Met: [] Adjusted   2. Patient will have a decrease in pain to facilitate improvement in movement, function, and ADLs as indicated by Functional Deficits. [] Progressing: [] Met: [] Not Met: [] Adjusted    Long Term Goals: To be achieved in: 8-10 weeks  1. Improve FOTO score to at least 66 to assist with reaching prior level of function. [] Progressing: [] Met: [] Not Met: [] Adjusted  2. Patient will demonstrate increased AROM to WNL of cervical/thoracic spine and L UE ROM to allow for proper joint functioning as indicated by patients Functional Deficits. [] Progressing: [] Met: [] Not Met: [] Adjusted  Patient will demonstrate an increase in postural awareness and control and activation of  Deep cervical stabilizers to allow for proper functional mobility as indicated by patients Functional Deficits. [] Progressing: [] Met: [] Not Met: [] Adjusted  4. Patient will return to daily functional activities without increased symptoms or restriction. [] Progressing: [] Met: [] Not Met: [] Adjusted  5. Patient will be able to hunt without increased symptoms or restriction. [] Progressing: [] Met: [] Not Met: [] Adjusted    Overall Progression Towards Functional goals/ Treatment Progress Update:  [] Patient is progressing as expected towards functional goals listed. [] Progression is slowed due to complexities/Impairments listed. [] Progression has been slowed due to co-morbidities.   [x] Plan just implemented, too soon to assess goals progression <30days   [] Goals require adjustment due to lack of progress  [] Patient is not progressing as expected and requires additional follow up with physician  [] Other    Prognosis for POC: [x] Good [] Fair  [] Poor      Patient requires continued skilled intervention: [x] Yes  [] No    Treatment/Activity Tolerance:  [x] Patient able to complete treatment  [] Patient limited by fatigue  [] Patient limited by pain     [] Patient limited by other medical complications  [x] Other: Pt required frequent verbal and tactile cues needed during session to improve quality of movements and improve postural awareness. Needed to remind him to perform all exercises slow and controlled 1/13                  Patient Education:  12/8 Patient education on PT and plan of care including diagnosis, prognosis, treatment goals and options. Patient agrees with discussed POC and treatment and is aware of rehab process. Pt was also educated on clinic layout and use of modalities. 12/29 Discussed importance of doing HEP in order to see improvements         PLAN: See eval  [x] Continue per plan of care [] Alter current plan (see comments above)  [] Plan of care initiated [] Hold pending MD visit [] Discharge      Electronically signed by:  Gaye Haile PT    Note: If patient does not return for scheduled/ recommended follow up visits, this note will serve as a discharge from care along with most recent update on progress.

## 2023-01-17 ENCOUNTER — TELEPHONE (OUTPATIENT)
Dept: PULMONOLOGY | Age: 63
End: 2023-01-17

## 2023-01-17 DIAGNOSIS — G47.33 OBSTRUCTIVE SLEEP APNEA (ADULT) (PEDIATRIC): Primary | ICD-10-CM

## 2023-01-18 ENCOUNTER — TELEPHONE (OUTPATIENT)
Dept: SLEEP CENTER | Age: 63
End: 2023-01-18

## 2023-01-18 NOTE — TELEPHONE ENCOUNTER
Called to schedule cpap per Sherin Gandhi picked up phone and asked to call back later -they were busy.      Psg done at [facility]

## 2023-01-25 ENCOUNTER — HOSPITAL ENCOUNTER (OUTPATIENT)
Dept: PHYSICAL THERAPY | Age: 63
Setting detail: THERAPIES SERIES
Discharge: HOME | End: 2023-01-25
Payer: MEDICAID

## 2023-01-25 PROCEDURE — 97112 NEUROMUSCULAR REEDUCATION: CPT | Performed by: PHYSICAL THERAPIST

## 2023-01-25 PROCEDURE — 97110 THERAPEUTIC EXERCISES: CPT | Performed by: PHYSICAL THERAPIST

## 2023-01-25 PROCEDURE — 97012 MECHANICAL TRACTION THERAPY: CPT | Performed by: PHYSICAL THERAPIST

## 2023-01-25 NOTE — PLAN OF CARE
The [address] [place] [address] [facility], [address] [place] [address], [address] [place] [address]  Phone: [phone]   Fax:     [phone]                                                    Physical Therapy Re-Certification Plan of Care    Dear Dr. Moncho Chamberlain,     We had the pleasure of treating the following patient for physical therapy services at [address] [place] [address]. A summary of our findings can be found in the updated assessment below. This includes our plan of care. If you have any questions or concerns regarding these findings, please do not hesitate to contact me at the office phone number checked above. Thank you for the referral.     Physician Signature:________________________________Date:__________________  By signing above (or electronic signature), therapists plan is approved by physician      Overall Response to Treatment:   []Patient is responding well to treatment and improvement is noted with regards  to goals   []Patient should continue to improve in reasonable time if they continue HEP   []Patient has plateaued and is no longer responding to skilled PT intervention    []Patient is getting worse and would benefit from return to referring MD   []Patient unable to adhere to initial POC   [x]Other: Patient still presents with limitations in ROM and strength that would benefit from skilled PT.      Date range of previous POC Visits: -    Total Visits: 8          Physical Therapy Daily Treatment Note  Date:  2023    Patient Name:  Rex Clarke    :  1960  MRN: [id number]  Restrictions/Precautions:    Medical/Treatment Diagnosis Information:  Diagnosis: M54.2 (ICD-10-CM) - Neck pain  Treatment Diagnosis: T42.3  Insurance/Certification information:  PT Insurance Information: [facility] Community Plan  Physician Information:  Dr. Moncho Chamberlain  Has the plan of care been signed (Y/N):        [x]  Yes  []  No     Date of Patient follow up with Physician:       Is this a Progress Report:     [x]  Yes  []  No        If Yes:  Date Range for reporting period:  Beginning 12/8  Ending 1/25    Progress report will be due (10 Rx or 30 days whichever is less): 6/92      Recertification will be due (POC Duration  / 90 days whichever is less): 2/25        Visit # Insurance Allowable Auth Required   8 1/25   16 visits 12/18-1/27  NEEDS DATE EXTENSION [x]  Yes []  No        Functional Scale: FOTO 53    Date assessed:  1/25     Latex Allergy:  [x]NO      []YES  Preferred Language for Healthcare:   [x]English       []other:      Pain level:  2/10 (1/25)     SUBJECTIVE:  Pt reports his shoulder is sore after sleeping on the shoulder. He states he is compliant with his HEP. 1/25    OBJECTIVE:   Observation:   Test measurements:       ROM 1/25 AROM Comments   Flexion 55     Extension 30     Side bend R 10  \"tight\"   Side bend L 10  \"tight\"   Rotation R Mod limited  \"Crunching\"   Rotation L Min limited  \"Crunching\"    Shoulder AROM clearing R WFL   L flexion 140, abd 30 (P!), IR mod limited with p!                     Strength 1/25 L R Comments   Neck flexion (C1-2)         Neck side bend (C3)         Shoulder elevation (C4) NT 5     Shoulder abduction (C5) NT 5     Elbow flexion and /or wrist extension  (C6) 4- 5     Elbow extension and/or wrist flexion  (C7) 4- 5     Thumb extension and/or ulnar deviation ((C8)         Abduction and /or adduction of hand intrinsics (T1)         IR/ER  4-/3+ 5            RESTRICTIONS/PRECAUTIONS:     Exercises/Interventions:   Exercise/Equipment Resistance/Repetitions Other comments   Stretching/PROM     AAROM flexion supine with cane  3x10  Added 1/6   Chin tuck Supine 10x10\"    UT side bend stretch     Levater scap stretch     Sleeper  10x10\"  Added 1/25   3 finger rotation  20x   Added 12/22   Pulleys  10x10\" flexion, scaption   Added 12/12        Isometrics     Retraction 3x10    Shrugs w/ bkwd roll 3x10    Cervical Flexion      Cervical Extension     Cervical sidebending          PRE's     External Rotation Side lying 3x10 0# ^ 1/25   Internal Rotation     Serratus     Biceps     Triceps     Shrugs     Abduction     Reverse Flys     EXT     Flexion Supine 3x10 Added 1/25   Abduction Side lying 1x10  D/C due to pain 1/25        Cable Column/Theraband     External Rotation 10x10\" step away ^ 1/25   TRIC     Lats     Shrugs     Flex     BIC     PNF     No money 3x10         Manual intervention Cervical traction  15' (18 lbs max, 13 lbs min) 1/6       Therapeutic Exercise and NMR EXR  [x] (79311) Provided verbal/tactile cueing for activities related to strengthening, flexibility, endurance, ROM  for improvements in cervical, postural, scapular, scapulothoracic and UE control with self care, reaching, carrying, lifting, house/yardwork, driving/computer work.    [] (16784) Provided verbal/tactile cueing for activities related to improving balance, coordination, kinesthetic sense, posture, motor skill, proprioception  to assist with cervical, scapular, scapulothoracic and UE control with self care, reaching, carrying, lifting, house/yardwork, driving/computer work. Therapeutic Activities:    [x] (10534 or 64831) Provided verbal/tactile cueing for activities related to improving balance, coordination, kinesthetic sense, posture, motor skill, proprioception and motor activation to allow for proper function of cervical, scapular, scapulothoracic and UE control with self care, carrying, lifting, driving/computer work.      Home Exercise Program:    [x] (71973) Reviewed/Progressed HEP activities related to strengthening, flexibility, endurance, ROM of cervical, scapular, scapulothoracic and UE control with self care, reaching, carrying, lifting, house/yardwork, driving/computer work  [] (12912) Reviewed/Progressed HEP activities related to improving balance, coordination, kinesthetic sense, posture, motor skill, proprioception of cervical, scapular, scapulothoracic and UE control with self care, reaching, carrying, lifting, house/yardwork, driving/computer work      Manual Treatments:  PROM / STM / Oscillations-Mobs:  G-I, II, III, IV (PA's, Inf., Post.)  [x] (80165) Provided manual therapy to mobilize soft tissue/joints of cervical/CT, scapular GHJ and UE for the purpose of decreasing headache, modulating pain, promoting relaxation,  increasing ROM, reducing/eliminating soft tissue swelling/inflammation/restriction, improving soft tissue extensibility and allowing for proper ROM for normal function with self care, reaching, carrying, lifting, house/yardwork, driving/computer work    Modalities:      Charges:  Timed Code Treatment Minutes: 40   Total Treatment Minutes: 10:42-11:51       [] EVAL (LOW) 23541 (typically 20 minutes face-to-face)  [] EVAL (MOD) 03023 (typically 30 minutes face-to-face)  [] EVAL (HIGH) 20940 (typically 45 minutes face-to-face)  [] RE-EVAL     [x] LG(53296) x 2    [] IONTO  [x] NMR (22199) x 1     [] VASO  [] Manual (95513) x      [] Other:  [] TA x      [x] Mech Traction (73894)  [] ES(attended) (00614)      [] ES (un) (50442):     HEP instruction:   Access Code: [email or web address]  URL: [email or web address]  Date: 12/08/2022  Prepared by: Best Kunz      GOALS:   Patient stated goal: Get rid of pain and gain normal function    [x] Progressing: [] Met: [] Not Met: [] Adjusted    Therapist goals for Patient:   Short Term Goals: To be achieved in: 2 weeks  1. Independent in HEP and progression per patient tolerance, in order to prevent re-injury. [x] Progressing: Pt still requires verbal and tactile cues [] Met: [] Not Met: [] Adjusted   2. Patient will have a decrease in pain to facilitate improvement in movement, function, and ADLs as indicated by Functional Deficits. [] Progressing: [x] Met: [] Not Met: [] Adjusted    Long Term Goals: To be achieved in: 8-10 weeks  1.  Improve FOTO score to at least 66 to assist with reaching prior level of function. [] Progressing: [] Met: [x] Not Met: [] Adjusted  2. Patient will demonstrate increased AROM to WNL of cervical/thoracic spine and L UE ROM to allow for proper joint functioning as indicated by patients Functional Deficits. [x] Progressing: [] Met: [] Not Met: [] Adjusted  Patient will demonstrate an increase in postural awareness and control and activation of  Deep cervical stabilizers to allow for proper functional mobility as indicated by patients Functional Deficits. [x] Progressing: [] Met: [] Not Met: [] Adjusted  4. Patient will return to daily functional activities without increased symptoms or restriction. [x] Progressing: [] Met: [] Not Met: [] Adjusted  5. Patient will be able to hunt without increased symptoms or restriction. [x] Progressing: [] Met: [] Not Met: [] Adjusted    Overall Progression Towards Functional goals/ Treatment Progress Update:  [] Patient is progressing as expected towards functional goals listed. [] Progression is slowed due to complexities/Impairments listed. [] Progression has been slowed due to co-morbidities. [] Plan just implemented, too soon to assess goals progression <30days   [] Goals require adjustment due to lack of progress  [] Patient is not progressing as expected and requires additional follow up with physician  [x] Other: Pt presents with slow progress - continues to need significant cues on his HEP. Neck pain is more manageable and symptoms tend to be more shoulder related. Focus on RC and scapular strength.  1/25    Prognosis for POC: [x] Good [] Fair  [] Poor      Patient requires continued skilled intervention: [x] Yes  [] No    Treatment/Activity Tolerance:  [x] Patient able to complete treatment  [] Patient limited by fatigue  [] Patient limited by pain     [] Patient limited by other medical complications  [x] Other: Pt required frequent verbal and tactile cues needed during session to improve quality of movements and improve postural awareness. Needed to remind him to perform all exercises slow and controlled 1/25                  Patient Education:  12/8 Patient education on PT and plan of care including diagnosis, prognosis, treatment goals and options. Patient agrees with discussed POC and treatment and is aware of rehab process. Pt was also educated on clinic layout and use of modalities. 12/29 Discussed importance of doing HEP in order to see improvements         PLAN: 2x per week for 4 weeks. 1/25  [x] Continue per plan of care [] Alter current plan (see comments above)  [] Plan of care initiated [] Hold pending MD visit [] Discharge      Electronically signed by:  Bolling Cranker, PT    Note: If patient does not return for scheduled/ recommended follow up visits, this note will serve as a discharge from care along with most recent update on progress.

## 2023-01-27 ENCOUNTER — HOSPITAL ENCOUNTER (OUTPATIENT)
Dept: PHYSICAL THERAPY | Age: 63
Setting detail: THERAPIES SERIES
Discharge: HOME | End: 2023-01-27
Payer: MEDICAID

## 2023-01-27 NOTE — FLOWSHEET NOTE
Physical Therapy  Cancellation/No-show Note  Patient Name:  Jaqueline Romero  :  1960   Date:  2023  Cancelled visits to date: 01  No-shows to date: 0    For today's appointment patient:  [x]  Cancelled  []  Rescheduled appointment  []  No-show     Reason given by patient:  []  Patient ill  []  Conflicting appointment  []  No transportation    []  Conflict with work  []  No reason given  [x]  Other:     Comments:  awaiting authorization     Electronically signed by:  Anton Givens, PT, DPT

## 2023-01-30 ENCOUNTER — APPOINTMENT (OUTPATIENT)
Dept: PHYSICAL THERAPY | Age: 63
End: 2023-01-30
Payer: MEDICAID

## 2023-01-31 ENCOUNTER — HOSPITAL ENCOUNTER (OUTPATIENT)
Dept: SLEEP CENTER | Age: 63
Discharge: HOME | End: 2023-01-31
Payer: MEDICAID

## 2023-01-31 DIAGNOSIS — G47.33 OBSTRUCTIVE SLEEP APNEA (ADULT) (PEDIATRIC): ICD-10-CM

## 2023-01-31 PROCEDURE — 95811 POLYSOM 6/>YRS CPAP 4/> PARM: CPT

## 2023-02-01 ENCOUNTER — APPOINTMENT (OUTPATIENT)
Dept: PHYSICAL THERAPY | Age: 63
End: 2023-02-01
Payer: MEDICAID

## 2023-02-02 ENCOUNTER — ANESTHESIA EVENT (OUTPATIENT)
Dept: ENDOSCOPY | Age: 63
End: 2023-02-02
Payer: MEDICAID

## 2023-02-03 ENCOUNTER — HOSPITAL ENCOUNTER (OUTPATIENT)
Age: 63
Setting detail: OUTPATIENT SURGERY
Discharge: HOME | End: 2023-02-03
Attending: INTERNAL MEDICINE | Admitting: INTERNAL MEDICINE
Payer: MEDICAID

## 2023-02-03 ENCOUNTER — ANESTHESIA (OUTPATIENT)
Dept: ENDOSCOPY | Age: 63
End: 2023-02-03
Payer: MEDICAID

## 2023-02-03 VITALS
SYSTOLIC BLOOD PRESSURE: 130 MMHG | OXYGEN SATURATION: 97 % | HEIGHT: 71 IN | RESPIRATION RATE: 16 BRPM | WEIGHT: 205 LBS | BODY MASS INDEX: 28.7 KG/M2 | DIASTOLIC BLOOD PRESSURE: 78 MMHG | HEART RATE: 64 BPM | TEMPERATURE: 97.3 F

## 2023-02-03 LAB
GLUCOSE BLD-MCNC: 117 MG/DL (ref 70–99)
PERFORMED ON: ABNORMAL

## 2023-02-03 PROCEDURE — 2580000003 HC RX 258: Performed by: FAMILY MEDICINE

## 2023-02-03 PROCEDURE — 3609027000 HC COLONOSCOPY: Performed by: INTERNAL MEDICINE

## 2023-02-03 PROCEDURE — 3700000000 HC ANESTHESIA ATTENDED CARE: Performed by: INTERNAL MEDICINE

## 2023-02-03 PROCEDURE — 6360000002 HC RX W HCPCS: Performed by: NURSE ANESTHETIST, CERTIFIED REGISTERED

## 2023-02-03 PROCEDURE — 7100000010 HC PHASE II RECOVERY - FIRST 15 MIN: Performed by: INTERNAL MEDICINE

## 2023-02-03 PROCEDURE — 7100000011 HC PHASE II RECOVERY - ADDTL 15 MIN: Performed by: INTERNAL MEDICINE

## 2023-02-03 RX ORDER — LIDOCAINE HYDROCHLORIDE 20 MG/ML
INJECTION, SOLUTION INTRAVENOUS PRN
Status: DISCONTINUED | OUTPATIENT
Start: 2023-02-03 | End: 2023-02-03 | Stop reason: SDUPTHER

## 2023-02-03 RX ORDER — SODIUM CHLORIDE, SODIUM LACTATE, POTASSIUM CHLORIDE, CALCIUM CHLORIDE 600; 310; 30; 20 MG/100ML; MG/100ML; MG/100ML; MG/100ML
INJECTION, SOLUTION INTRAVENOUS CONTINUOUS
Status: DISCONTINUED | OUTPATIENT
Start: 2023-02-03 | End: 2023-02-03 | Stop reason: HOSPADM

## 2023-02-03 RX ORDER — SODIUM CHLORIDE 0.9 % (FLUSH) 0.9 %
5-40 SYRINGE (ML) INJECTION PRN
Status: CANCELLED | OUTPATIENT
Start: 2023-02-03

## 2023-02-03 RX ORDER — SODIUM CHLORIDE 9 MG/ML
INJECTION, SOLUTION INTRAVENOUS PRN
Status: CANCELLED | OUTPATIENT
Start: 2023-02-03

## 2023-02-03 RX ORDER — SODIUM CHLORIDE 0.9 % (FLUSH) 0.9 %
5-40 SYRINGE (ML) INJECTION EVERY 12 HOURS SCHEDULED
Status: CANCELLED | OUTPATIENT
Start: 2023-02-03

## 2023-02-03 RX ORDER — PROPOFOL 10 MG/ML
INJECTION, EMULSION INTRAVENOUS CONTINUOUS PRN
Status: DISCONTINUED | OUTPATIENT
Start: 2023-02-03 | End: 2023-02-03 | Stop reason: SDUPTHER

## 2023-02-03 RX ORDER — PROPOFOL 10 MG/ML
INJECTION, EMULSION INTRAVENOUS PRN
Status: DISCONTINUED | OUTPATIENT
Start: 2023-02-03 | End: 2023-02-03 | Stop reason: SDUPTHER

## 2023-02-03 RX ADMIN — LIDOCAINE HYDROCHLORIDE 50 MG: 20 INJECTION, SOLUTION INTRAVENOUS at 07:32

## 2023-02-03 RX ADMIN — PROPOFOL 100 MG: 10 INJECTION, EMULSION INTRAVENOUS at 07:32

## 2023-02-03 RX ADMIN — PROPOFOL 120 MCG/KG/MIN: 10 INJECTION, EMULSION INTRAVENOUS at 07:32

## 2023-02-03 RX ADMIN — SODIUM CHLORIDE, POTASSIUM CHLORIDE, SODIUM LACTATE AND CALCIUM CHLORIDE: 600; 310; 30; 20 INJECTION, SOLUTION INTRAVENOUS at 06:54

## 2023-02-03 ASSESSMENT — PAIN - FUNCTIONAL ASSESSMENT: PAIN_FUNCTIONAL_ASSESSMENT: 0-10

## 2023-02-03 ASSESSMENT — PAIN SCALES - WONG BAKER: WONGBAKER_NUMERICALRESPONSE: 0

## 2023-02-03 NOTE — H&P
Gastroenterology Note                 Pre-operative History and Physical    Patient: Ricci Vasques  : 1960  CSN:     History Obtained From:   Patient or guardian. HISTORY OF PRESENT ILLNESS:    The patient is a 60 y.o. male here for screening colonoscopy. He did not stop his aspirin and Plavix for the procedure    Past Medical History:    Past Medical History:   Diagnosis Date    Allergic rhinitis 10/11/2012    Cerebrovascular accident (CVA) due to occlusion of right cerebellar artery ([facility] 2022    [facility]  /    HTN (hypertension) 10/11/2012    Hyperlipidemia     Sleep apnea     Stroke (cerebrum) ([facility] 2022    Type 2 diabetes mellitus ([facility] 08/10/2022     Past Surgical History:    Past Surgical History:   Procedure Laterality Date    CARDIOVASCULAR STRESS TEST  Oct 2012    JHK, negative GXT     Medications Prior to Admission:   No current facility-administered medications on file prior to encounter.      Current Outpatient Medications on File Prior to Encounter   Medication Sig Dispense Refill    clopidogrel (PLAVIX) 75 MG tablet Take 1 tablet by mouth once daily 30 tablet 5    traZODone (DESYREL) 100 MG tablet Take 1 tablet by mouth nightly 30 tablet 2    NIFEdipine (PROCARDIA XL) 30 MG extended release tablet Take 1 tablet by mouth once daily 30 tablet 5    atorvastatin (LIPITOR) 80 MG tablet Take 1 tablet by mouth once daily 30 tablet 5    SITagliptin (JANUVIA) 100 MG tablet Take 1 tablet by mouth daily 90 tablet 1    glipiZIDE (GLUCOTROL) 5 MG tablet TAKE 1 TABLET BY MOUTH TWICE DAILY BEFORE MEAL(S) 60 tablet 2    tiZANidine (ZANAFLEX) 4 MG tablet Take 1 tablet by mouth 3 times daily as needed (Muscle tightness/spasm) (Patient not taking: Reported on 2/3/2023) 30 tablet 1    ipratropium (ATROVENT) 0.03 % nasal spray 2 sprays by Each Nostril route 3 times daily as needed for Rhinitis 30 mL 3    lisinopril (PRINIVIL;ZESTRIL) 40 MG tablet Take 1 tablet by mouth daily 30 tablet 2    NONFORMULARY TOTAL BEETS (blood pressure)      melatonin 3 MG TABS tablet Take 3 mg by mouth daily 2 tablet nightly 6mgh      nicotine (NICODERM CQ) 21 MG/24HR Place 1 patch onto the skin every 24 hours      aspirin EC 81 MG EC tablet Take 1 tablet by mouth daily. 30 tablet 3        Allergies:  Patient has no known allergies. Social History:   Social History     Tobacco Use    Smoking status: Former     Packs/day: 1.00     Years: 15.00     Pack years: 15.00     Types: Cigarettes     Quit date:      Years since quittin.1    Smokeless tobacco: Former     Types: Chew   Substance Use Topics    Alcohol use: No     Family History:   Family History   Problem Relation Age of Onset    Cancer Mother         breast ca. @age 77    Diabetes Father         @age 76       PHYSICAL EXAM:      /84   Pulse 71   Temp 97.4 °F (36.3 °C) (Temporal)   Resp 16   Ht 5' 11\" (1.803 m)   Wt 205 lb (93 kg)   SpO2 96%   BMI 28.59 kg/m²  I        Heart:  RRR, normal s1s2    Lungs:  CTA and normal effort    Abdomen:   Soft, nt nd. ASSESSMENT AND PLAN:    1. Patient is a 60 y.o. male here for endoscopy with MAC sedation. 2.  Procedure options, risks and benefits reviewed with patient and/or guardian. They express understanding.      Matthew Hernández MD  [address] [place] [address]

## 2023-02-03 NOTE — DISCHARGE INSTRUCTIONS
ENDOSCOPY DISCHARGE INSTRUCTIONS:    Call the physician that did your procedure for any questions or concern:    [facility]: [phone]  DR. OLEGARIO SANTILLAN      ACTIVITY:    There are potential side effects to the medications used for sedation and anesthesia during your procedure. These include:  Dizziness or light-headedness, confusion or memory loss, delayed reaction times, loss of coordination, nausea and vomiting. Because of your increased risk for injury, we ask that you observe the following precautions: For the next 24 hours,  DO NOT operate an automobile, bicycle, motorcycle, , power tools or large equipment of any kind. Do not drink alcohol, sign any legal documents or make any legal decisions for 24 hours. Do not bend your head over lower than your heart. DO sit on the side of bed/couch awhile before getting up. Plan on bedrest or quiet relaxation today. You may resume normal activities in 24 hours. DIET:    Your first meal today should be light, avoiding spicy and fatty foods. If you tolerate this first meal, then you may advance to your regular diet unless otherwise advised by your physician. NORMAL SYMPTOMS:  -Gaseous discomfort    NOTIFY YOUR PHYSICIAN IF THESE SYMPTOMS OCCUR:  1. Fever (greater than 100)  5. Increased abdominal bloating  2. Severe pain    6. Excessive bleeding  3. Nausea and vomiting  7. Chest pain                                                                    4. Chills    8. Shortness of breath    ADDITIONAL INSTRUCTIONS:    Biopsy results: NONE    Educational Information:          Please review these discharge instructions this evening or tomorrow for  information you may have forgotten. We want to thank you for choosing the [facility] as your health care provider. We always strive to provide you with excellent care while you are here. You may receive a survey in the mail regarding your care.  We would appreciate you taking a few minutes of your time to complete this survey.

## 2023-02-03 NOTE — ANESTHESIA POSTPROCEDURE EVALUATION
Department of Anesthesiology  Postprocedure Note    Patient: Celina Strickland  MRN: [id number]  YOB: 1960  Date of evaluation: 2/3/2023      Procedure Summary     Date: 02/03/23 Room / Location: [facility]    Anesthesia Start: 5350 Anesthesia Stop: 1697    Procedure: COLONOSCOPY Diagnosis:       Colon cancer screening      (Colon cancer screening [Z12.11])    Surgeons: Zane Ram MD Responsible Provider: Erendira Alvarez MD    Anesthesia Type: MAC ASA Status: 3          Anesthesia Type: No value filed.     Betty Phase I: Betty Score: 10    Betty Phase II: Betty Score: 10      Anesthesia Post Evaluation    Patient location during evaluation: PACU  Patient participation: complete - patient participated  Level of consciousness: awake  Pain score: 0  Airway patency: patent  Nausea & Vomiting: no nausea  Complications: no  Cardiovascular status: hemodynamically stable  Respiratory status: acceptable  Hydration status: stable

## 2023-02-03 NOTE — ANESTHESIA PRE PROCEDURE
Department of Anesthesiology  Preprocedure Note       Name:  Donna Chase   Age:  60 y.o.  :  1960                                          MRN:  [id number]         Date:  2/3/2023      Surgeon: Eber Hodgson):  Ramila Stock MD    Procedure: Procedure(s):  COLONOSCOPY    Medications prior to admission:   Prior to Admission medications    Medication Sig Start Date End Date Taking? Authorizing Provider   clopidogrel (PLAVIX) 75 MG tablet Take 1 tablet by mouth once daily 22   Neeru Judd MD   traZODone (DESYREL) 100 MG tablet Take 1 tablet by mouth nightly 22   Neeru Judd MD   NIFEdipine (PROCARDIA XL) 30 MG extended release tablet Take 1 tablet by mouth once daily 22   Neeru Judd MD   atorvastatin (LIPITOR) 80 MG tablet Take 1 tablet by mouth once daily 22   Neeru Judd MD   SITagliptin (JANUVIA) 100 MG tablet Take 1 tablet by mouth daily 22   Neeru Judd MD   glipiZIDE (GLUCOTROL) 5 MG tablet TAKE 1 TABLET BY MOUTH TWICE DAILY BEFORE MEAL(S) 22   Neeru Judd MD   tiZANidine (ZANAFLEX) 4 MG tablet Take 1 tablet by mouth 3 times daily as needed (Muscle tightness/spasm) 22   Марина John MD   ipratropium (ATROVENT) 0.03 % nasal spray 2 sprays by Each Nostril route 3 times daily as needed for Rhinitis 11/10/22   Sushil Faye DO   lisinopril (PRINIVIL;ZESTRIL) 40 MG tablet Take 1 tablet by mouth daily 22   LETTY Yarbrough - CNP   NONFORMULARY TOTAL BEETS (blood pressure)    Historical Provider, MD   melatonin 3 MG TABS tablet Take 3 mg by mouth daily 2 tablet nightly 6mgh    Historical Provider, MD   nicotine (NICODERM CQ) 21 MG/24HR Place 1 patch onto the skin every 24 hours    Historical Provider, MD   aspirin EC 81 MG EC tablet Take 1 tablet by mouth daily.  10/11/12   Paola Bradshaw MD       Current medications:    Current Facility-Administered Medications   Medication Dose Route Frequency Provider Last Rate Last Admin    lactated ringers IV soln infusion   IntraVENous Continuous Jarrell Aragon MD 50 mL/hr at 23 0659 NoRateChange at 23 1846       Allergies:  No Known Allergies    Problem List:    Patient Active Problem List   Diagnosis Code    HTN (hypertension) I10    Allergic rhinitis J30.9    Chronic back pain M54.9, G89.29    Cerebrovascular accident (CVA) due to occlusion of right cerebellar artery ([facility] I63.541    Type 2 diabetes mellitus ([facility] E11.9    Chronic anticoagulation Z79.01       Past Medical History:        Diagnosis Date    Allergic rhinitis 10/11/2012    Cerebrovascular accident (CVA) due to occlusion of right cerebellar artery ([facility] 2022    [facility]    HTN (hypertension) 10/11/2012    Hyperlipidemia     Sleep apnea     Stroke (cerebrum) ([facility] 2022    Type 2 diabetes mellitus ([facility] 08/10/2022       Past Surgical History:        Procedure Laterality Date    CARDIOVASCULAR STRESS TEST  Oct 2012    JHK, negative GXT       Social History:    Social History     Tobacco Use    Smoking status: Former     Packs/day: 1.00     Years: 15.00     Pack years: 15.00     Types: Cigarettes     Quit date:      Years since quittin.1    Smokeless tobacco: Former     Types: Chew   Substance Use Topics    Alcohol use:  No                                Counseling given: Not Answered      Vital Signs (Current):   Vitals:    23 0636   BP: 139/84   Pulse: 71   Resp: 16   Temp: 97.4 °F (36.3 °C)   TempSrc: Temporal   SpO2: 96%   Weight: 205 lb (93 kg)   Height: 5' 11\" (1.803 m)                                              BP Readings from Last 3 Encounters:   23 139/84   22 139/80   22 (!) 140/91       NPO Status: Time of last liquid consumption: 530                        Time of last solid consumption: 163                        Date of last liquid consumption: 23                        Date of last solid food consumption: 23    BMI:   Wt Readings from Last 3 Encounters:   02/03/23 205 lb (93 kg)   12/09/22 212 lb (96.2 kg)   12/07/22 212 lb (96.2 kg)     Body mass index is 28.59 kg/m². CBC:   Lab Results   Component Value Date/Time    WBC 6.4 08/10/2022 03:31 PM    RBC 4.49 08/10/2022 03:31 PM    HGB 13.6 08/10/2022 03:31 PM    HCT 40.6 08/10/2022 03:31 PM    MCV 90.4 08/10/2022 03:31 PM    RDW 13.1 08/10/2022 03:31 PM     08/10/2022 03:31 PM       CMP:   Lab Results   Component Value Date/Time     12/07/2022 02:35 PM    K 5.1 12/07/2022 02:35 PM     12/07/2022 02:35 PM    CO2 26 12/07/2022 02:35 PM    BUN 20 12/07/2022 02:35 PM    CREATININE 1.2 12/07/2022 02:35 PM    GFRAA >60 08/10/2022 03:31 PM    AGRATIO 1.5 12/07/2022 02:35 PM    LABGLOM >60 12/07/2022 02:35 PM    GLUCOSE 191 12/07/2022 02:35 PM    PROT 8.1 12/07/2022 02:35 PM    CALCIUM 10.3 12/07/2022 02:35 PM    BILITOT 0.3 12/07/2022 02:35 PM    ALKPHOS 78 12/07/2022 02:35 PM    AST 18 12/07/2022 02:35 PM    ALT 35 12/07/2022 02:35 PM       POC Tests:   Recent Labs     02/03/23  0657   POCGLU 117*       Coags: No results found for: PROTIME, INR, APTT    HCG (If Applicable): No results found for: PREGTESTUR, PREGSERUM, HCG, HCGQUANT     ABGs: No results found for: PHART, PO2ART, YBD6EMV, CYQ1NCD, BEART, E6TOXBOQ     Type & Screen (If Applicable):  No results found for: LABABO, LABRH    Drug/Infectious Status (If Applicable):  No results found for: HIV, HEPCAB    COVID-19 Screening (If Applicable): No results found for: COVID19        Anesthesia Evaluation  Patient summary reviewed  Airway: Mallampati: III  TM distance: >3 FB   Neck ROM: full  Mouth opening: > = 3 FB   Dental:          Pulmonary:normal exam    (+) sleep apnea:                             Cardiovascular:  Exercise tolerance: poor (<4 METS),   (+) hypertension:,                   Neuro/Psych:   (+) CVA (LUE weakness, speech.   On plavix, asa): residual symptoms,             GI/Hepatic/Renal:             Endo/Other: (+) DiabetesType II DM, no interval change, , .                 Abdominal:             Vascular: Other Findings:           Anesthesia Plan      MAC     ASA 3       Induction: intravenous. Anesthetic plan and risks discussed with patient.         Attending anesthesiologist reviewed and agrees with Sera Nixon MD   2/3/2023

## 2023-02-03 NOTE — PROCEDURES
[address] [place] [address]/formerly [facility]  Colonoscopy Note    Patient: Johanne Lord  : 1960  Acct#:     Procedure: Colonoscopy     Date:  2/3/2023    Surgeon:  Lucian Ivey MD    Referring Physician:  Shelly Michel MD    Anesthesia: IV propofol, per anesthesia    EBL: <50 mL    Indications: Screening for colon cancer    Procedure: An informed consent was obtained from the patient after explanation of indications, benefits, possible risks and complications of the procedure. The patient was then taken to the endoscopy suite, placed in the left lateral decubitus position, and the above IV anesthesia was administered. A digital rectal examination was performed and revealed negative without mass, lesions or tenderness. The Olympus PCFQ-H190 video colonoscope was placed in the patient's rectum under digital direction and advanced to the cecum. The cecum was identified by characteristic anatomy and ballottment. The prep was poor. Findings:  Poor prep with solid stool throughout the entire colon which precluded visualization. A pedunculated polyp measuring approximately 12 mm was seen in the descending colon. The polyp was not removed due to poor prep and the patient did not hold his Plavix for the procedure. The scope was straightened, the colon was decompressed and the scope was withdrawn from the patient. The patient tolerated the procedure well and was taken to the PACU in good condition. Biopsies:  no       Impression:   Poor prep with solid stool throughout the entire colon which precluded visualization. A pedunculated polyp measuring approximately 12 mm was seen in the descending colon. The polyp was not removed due to poor prep and the patient did not hold his Plavix for the procedure. Recommendations:  Reschedule colonoscopy with more extensive bowel prep and seek permission to hold Plavix for the procedure.        Shelly Michel MD  [address]

## 2023-02-09 ENCOUNTER — OFFICE VISIT (OUTPATIENT)
Dept: ENT CLINIC | Age: 63
End: 2023-02-09
Payer: MEDICAID

## 2023-02-09 VITALS
HEIGHT: 71 IN | HEART RATE: 72 BPM | BODY MASS INDEX: 29.12 KG/M2 | WEIGHT: 208 LBS | TEMPERATURE: 97.3 F | SYSTOLIC BLOOD PRESSURE: 151 MMHG | DIASTOLIC BLOOD PRESSURE: 90 MMHG

## 2023-02-09 DIAGNOSIS — R13.13 PHARYNGEAL DYSPHAGIA: ICD-10-CM

## 2023-02-09 DIAGNOSIS — Z87.891 HISTORY OF SMOKELESS TOBACCO USE: ICD-10-CM

## 2023-02-09 DIAGNOSIS — K11.7 SIALORRHEA: Primary | ICD-10-CM

## 2023-02-09 DIAGNOSIS — I63.541 CEREBROVASCULAR ACCIDENT (CVA) DUE TO OCCLUSION OF RIGHT CEREBELLAR ARTERY (HCC): ICD-10-CM

## 2023-02-09 DIAGNOSIS — J30.0 VASOMOTOR RHINITIS: ICD-10-CM

## 2023-02-09 DIAGNOSIS — J39.2 DRY THROAT: ICD-10-CM

## 2023-02-09 PROCEDURE — 3017F COLORECTAL CA SCREEN DOC REV: CPT | Performed by: OTOLARYNGOLOGY

## 2023-02-09 PROCEDURE — G8427 DOCREV CUR MEDS BY ELIG CLIN: HCPCS | Performed by: OTOLARYNGOLOGY

## 2023-02-09 PROCEDURE — G8419 CALC BMI OUT NRM PARAM NOF/U: HCPCS | Performed by: OTOLARYNGOLOGY

## 2023-02-09 PROCEDURE — G8484 FLU IMMUNIZE NO ADMIN: HCPCS | Performed by: OTOLARYNGOLOGY

## 2023-02-09 PROCEDURE — 3080F DIAST BP >= 90 MM HG: CPT | Performed by: OTOLARYNGOLOGY

## 2023-02-09 PROCEDURE — 99213 OFFICE O/P EST LOW 20 MIN: CPT | Performed by: OTOLARYNGOLOGY

## 2023-02-09 PROCEDURE — 1036F TOBACCO NON-USER: CPT | Performed by: OTOLARYNGOLOGY

## 2023-02-09 PROCEDURE — 3077F SYST BP >= 140 MM HG: CPT | Performed by: OTOLARYNGOLOGY

## 2023-02-09 ASSESSMENT — ENCOUNTER SYMPTOMS
EYE REDNESS: 0
NAUSEA: 0
EYE ITCHING: 0
RHINORRHEA: 0
DIARRHEA: 0
COUGH: 0
COLOR CHANGE: 0
STRIDOR: 0
EYE PAIN: 0
SINUS PRESSURE: 0
TROUBLE SWALLOWING: 0
SORE THROAT: 0
SHORTNESS OF BREATH: 0
CHOKING: 0
FACIAL SWELLING: 0
VOICE CHANGE: 0
PHOTOPHOBIA: 0
SINUS PAIN: 0

## 2023-02-09 NOTE — PROGRESS NOTES
[facility]  [address], [address] [facility], [address]  P: [phone]  F: [phone]       Patient     Gillian Mc  1960    ChiefComplaint     Chief Complaint   Patient presents with    Follow-up     Patient is here today for his 3 month follow up, he is doing much better since his last visit and states that now he does not need as much of the medication       History of Present Illness     Gillian Mc is a pleasant 60 y.o. male here for 3-month follow-up for sialorrhea, vasomotor rhinitis, dysphagia. Since initiating the use of Atrovent, he has noticed a significant improvement of his symptoms. Runny nose is much more manageable. He is not feeling copious amounts of mucus going to his throat or mouth. Overall he is very satisfied with symptom improvement. Past Medical History     Past Medical History:   Diagnosis Date    Allergic rhinitis 10/11/2012    Cerebrovascular accident (CVA) due to occlusion of right cerebellar artery ([address] 07/25/2022    [facility]  /    HTN (hypertension) 10/11/2012    Hyperlipidemia     Sleep apnea     Stroke (cerebrum) ([address] 07/25/2022    Type 2 diabetes mellitus ([address] 08/10/2022       Past Surgical History     Past Surgical History:   Procedure Laterality Date    CARDIOVASCULAR STRESS TEST  Oct 2012    K, negative GXT    COLONOSCOPY N/A 2/3/2023    COLONOSCOPY performed by Alyssa Mccall MD at [address] ENDOSCOPY       Family History     Family History   Problem Relation Age of Onset    Cancer Mother         breast ca.   @age 68    Diabetes Father         @age 76       Social History     Social History     Socioeconomic History    Marital status:      Spouse name: Not on file    Number of children: 1    Years of education: Not on file    Highest education level: Not on file   Occupational History    Occupation: labor   Tobacco Use    Smoking status: Former     Packs/day: 1.00     Years: 15.00     Pack years: 15.00     Types: Cigarettes     Quit date:      Years since quittin.1    Smokeless tobacco: Former     Types: Chew   Vaping Use    Vaping Use: Never used   Substance and Sexual Activity    Alcohol use: No    Drug use: Never    Sexual activity: Not on file   Other Topics Concern    Not on file   Social History Narrative    He was  from wife    SS  since Dec    Covered under insurance of his wife     Dip nicotine 2 can/day Alcohol None     Social Determinants of Health     Financial Resource Strain: Low Risk     Difficulty of Paying Living Expenses: Not hard at all   Food Insecurity: No Food Insecurity    Worried About Running Out of Food in the Last Year: Never true    Ran Out of Food in the Last Year: Never true   Transportation Needs: Not on file   Physical Activity: Not on file   Stress: Not on file   Social Connections: Not on file   Intimate Partner Violence: Not on file   Housing Stability: Not on file       Allergies     No Known Allergies    Medications     Current Outpatient Medications   Medication Sig Dispense Refill    clopidogrel (PLAVIX) 75 MG tablet Take 1 tablet by mouth once daily 30 tablet 5    traZODone (DESYREL) 100 MG tablet Take 1 tablet by mouth nightly 30 tablet 2    NIFEdipine (PROCARDIA XL) 30 MG extended release tablet Take 1 tablet by mouth once daily 30 tablet 5    atorvastatin (LIPITOR) 80 MG tablet Take 1 tablet by mouth once daily 30 tablet 5    SITagliptin (JANUVIA) 100 MG tablet Take 1 tablet by mouth daily 90 tablet 1    glipiZIDE (GLUCOTROL) 5 MG tablet TAKE 1 TABLET BY MOUTH TWICE DAILY BEFORE MEAL(S) 60 tablet 2    tiZANidine (ZANAFLEX) 4 MG tablet Take 1 tablet by mouth 3 times daily as needed (Muscle tightness/spasm) 30 tablet 1    ipratropium (ATROVENT) 0.03 % nasal spray 2 sprays by Each Nostril route 3 times daily as needed for Rhinitis 30 mL 3    lisinopril (PRINIVIL;ZESTRIL) 40 MG tablet Take 1 tablet by mouth daily 30 tablet 2    NONFORMULARY TOTAL BEETS (blood pressure) melatonin 3 MG TABS tablet Take 3 mg by mouth daily 2 tablet nightly 6mgh      nicotine (NICODERM CQ) 21 MG/24HR Place 1 patch onto the skin every 24 hours      aspirin EC 81 MG EC tablet Take 1 tablet by mouth daily. 30 tablet 3     No current facility-administered medications for this visit. Review of Systems     Review of Systems   Constitutional:  Negative for chills, fatigue and fever. HENT:  Negative for congestion, ear discharge, ear pain, facial swelling, hearing loss, nosebleeds, postnasal drip, rhinorrhea, sinus pressure, sinus pain, sneezing, sore throat, tinnitus, trouble swallowing and voice change. Eyes:  Negative for photophobia, pain, redness, itching and visual disturbance. Respiratory:  Negative for cough, choking, shortness of breath and stridor. Gastrointestinal:  Negative for diarrhea and nausea. Musculoskeletal:  Negative for neck pain and neck stiffness. Skin:  Negative for color change and rash. Neurological:  Negative for dizziness, facial asymmetry and light-headedness. Hematological:  Negative for adenopathy. Psychiatric/Behavioral:  Negative for agitation and confusion. PhysicalExam     Vitals:    02/09/23 0951   BP: (!) 150/94   Pulse: 72   Temp: 97.3 °F (36.3 °C)       Physical Exam  Constitutional:       Appearance: He is well-developed. HENT:      Head: Normocephalic and atraumatic. Jaw: No trismus. Right Ear: Tympanic membrane, ear canal and external ear normal. No drainage. No middle ear effusion. Tympanic membrane is not perforated. Left Ear: Tympanic membrane, ear canal and external ear normal. No drainage. No middle ear effusion. Tympanic membrane is not perforated. Nose: No septal deviation, mucosal edema or rhinorrhea. Mouth/Throat:      Dentition: Normal dentition. Pharynx: Uvula midline. No oropharyngeal exudate. Eyes:      General: No scleral icterus. Right eye: No discharge.          Left eye: No discharge. Pupils: Pupils are equal, round, and reactive to light. Neck:      Thyroid: No thyromegaly. Trachea: Phonation normal. No tracheal deviation. Pulmonary:      Effort: Pulmonary effort is normal. No respiratory distress. Breath sounds: No stridor. Musculoskeletal:      Cervical back: Neck supple. Lymphadenopathy:      Cervical: No cervical adenopathy. Skin:     General: Skin is warm and dry. Neurological:      Mental Status: He is alert and oriented to person, place, and time. Cranial Nerves: No cranial nerve deficit. Psychiatric:         Behavior: Behavior normal.         Procedure           Assessment and Plan     1. Sialorrhea  Significant improvement, near resolution of symptoms after initiation of Atrovent. Not recommend any further medications at this time. Continue Atrovent as needed. Follow-up with me in 1 year. If symptoms worsen in the meantime he should call. 2. Vasomotor rhinitis      3. History of smokeless tobacco use      4. Cerebrovascular accident (CVA) due to occlusion of right cerebellar artery ([address]      5. Pharyngeal dysphagia      6. Dry throat      Return in about 1 year (around 2/9/2024). [ ] Review/order radiology tests   [ ] Independent interpretation of diagnostic test by another provider  [ ] Discussed case with another provider  [ ] High risk of loss of major body function  [ ] Elective major surgery with risk factors    Portions of this note were dictated using Dragon.  There may be linguistic errors secondary to the use of this program.

## 2023-02-13 ENCOUNTER — TELEPHONE (OUTPATIENT)
Dept: PULMONOLOGY | Age: 63
End: 2023-02-13

## 2023-02-13 NOTE — TELEPHONE ENCOUNTER
Spoke with spouse. Pt is struggling with nasal mask and would like to try a FFM.  Order to be sent to [facility]

## 2023-02-13 NOTE — PROGRESS NOTES
Jose Eduardo Whitehead         : 1960  MSC    Diagnosis: [x] CHARO (G47.33) [] CSA (G47.31) [] Apnea (G47.30)   Length of Need: [x] 18 Months [] 99 Months [] Other:    Machine (HIMANSHU!): [] Respironics Dream Station   2   Auto [x] ResMed AirSense     Auto S11 [] Other:     []  CPAP () [] Bilevel ()   Mode: [] Auto [] Spontaneous    Mode: [] Auto [] Spontaneous            Comfort Settings:   - Ramp Pressure: 5 cmH2O                                        - Ramp time: 15 min                                     -  Flex/EPR - 3 full time                                    - For ResMed Bilevel (TiMax-4 sec   TiMin- 0.2 sec)     Humidifier: [x] Heated ()        [x] Water chamber replacement ()/ 1 per 6 months        Mask:   [] Nasal () /1 per 3 months [x] Full Face () /1 per 3 months   [] Patient choice -Size and fit mask [x] Patient Choice - Size and fit mask   [] Dispense:  [] Dispense:    [] Headgear () / 1 per 3 months [x] Headgear () / 1 per 3 months   [] Replacement Nasal Cushion ()/2 per month [x] Interface Replacement ()/1 per month   [] Replacement Nasal Pillows ()/2 per month         Tubing: [] Heated ()/1 per 3 months    [] Standard ()/1 per 3 months [] Other:           Filters: [] Non-disposable ()/1 per 6 months     [] Ultra-Fine, Disposable ()/2 per month        Miscellaneous: [] Chin Strap ()/ 1 per 6 months [] O2 bleed-in:       LPM   [] Oximetry on CPAP/Bilevel []  Other:    [] Modem: ()         Start Order Date: 23    MEDICAL JUSTIFICATION:  I, the undersigned, certify that the above prescribed supplies are medically necessary for this patient’s wellbeing.  In my opinion, the supplies are both reasonable and necessary in reference to accepted standards of medicalpractice in treatment of this patient’s condition.    THEODORE P MOSELEY, MD      NPI: [id number]       Order Signed Date: 23    Electronically signed by  Amelia Carlson MD on 2023 at 4:17 PM    [address] [facility]  1960  [place] [id number] [facility] [address]  [phone] (home)   [phone] (mobile)      Insurance Info (confirm with patient if correct):  Payer/Plan Subscr  Sex Relation Sub.  Ins. ID Effective Group Num

## 2023-02-15 ENCOUNTER — OFFICE VISIT (OUTPATIENT)
Dept: ORTHOPEDIC SURGERY | Age: 63
End: 2023-02-15

## 2023-02-15 VITALS — BODY MASS INDEX: 29.26 KG/M2 | HEIGHT: 71 IN | WEIGHT: 209 LBS

## 2023-02-15 DIAGNOSIS — M75.02 ADHESIVE CAPSULITIS OF LEFT SHOULDER: Primary | ICD-10-CM

## 2023-02-15 RX ORDER — GLIPIZIDE 5 MG/1
TABLET ORAL
Qty: 60 TABLET | Refills: 2 | Status: SHIPPED | OUTPATIENT
Start: 2023-02-15

## 2023-02-15 NOTE — PROGRESS NOTES
Follow-up (Lt Shoulder)      History of Present Illness:  Elizabeth Spring is a 60 y.o. male follow-up of left shoulder. He has history of left-sided stroke 6 months ago. He complains he has painful and stiff range of motion of the left shoulder however it is improved since originally suffering the stroke. He did have physical therapy however this was exhausted at the end of 2022. Medical History:  Patient's medications, allergies, past medical, surgical, social and family histories were reviewed and updated as appropriate. Pain Assessment  Location of Pain: Shoulder  Location Modifiers: Left  Severity of Pain: 6  Quality of Pain: Aching  Frequency of Pain: Intermittent  Aggravating Factors: Stretching  Limiting Behavior: Yes  Relieving Factors: Nsaids (Tylenol)  Result of Injury: No  Work-Related Injury: No  Are there other pain locations you wish to document?: No  ROS: Review of systems reviewed from Patient History Form completed today and available in the patient's chart under the Media tab. Pertinent items are noted in HPI  Review of systems reviewed from Patient History Form completed today and available in the patient's chart under the Media tab. Vital Signs:  Ht 5' 11\" (1.803 m)   Wt 209 lb (94.8 kg)   BMI 29.15 kg/m²       Left shoulder examination:    Inspection:  Held in a normal posture. Normal contour at the acromioclavicular joint. No swelling, ecchymosis, or erythema about the shoulder. No atrophy appreciated. No scapular winging. Palpation:  No subacromial crepitus. No tenderness of the AC joint. No greater tuberosity tenderness. No tenderness in the bicipital groove. Range of Motion: Stiff shoulder decreased active and passive range of motion to about 130 degrees forward flexion, 50 degrees external Tatian and internal Tatian to the L5 region. Strength:  Normal supraspinatus, infraspinatus, and subscapularis muscle strength. Stability: No anterior instability.  No posterior instability. Special Tests: Impingement findings are negative. Labral findings are negative. Speed sign and Yergason signs are both negative. Crossover sign is negative. Belly press sign is negative. Lift off sign is negative. Other findings: The skin is warm dry and well perfused. 2+ radial pulse. Sensation is intact to light touch over the deltoid. Radiology:       No new images taken at today's visit. Assessment :  60 y.o. 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 Impression:  No diagnosis found. Office Procedures:  No orders of the defined types were placed in this encounter. Plan:   Pertinent imaging was reviewed. The etiology, natural history, and treatment options for the disorder were discussed. The roles of activity medication, antiinflammatories, injections, bracing, physical therapy, and surgical interventions were all described to the patient and questions were answered. Aminah Barakat comes in today with improved motion since suffering a left-sided stroke 6 months ago. He still however is quite stiff with decreased active and passive range of motion and relatively good strength. He has regained a lot since his stroke however he needs to continue physical therapy to improve his poststroke adhesive capsulitis. We will get him back into physical therapy and see him again in 6 weeks to see how he is doing. Arin Pineda is in agreement with this plan. All questions were answered to patient's satisfaction and was encouraged to call with any further questions. Total time spent for evaluation, education, and development of treatment plan: 38 minutes    The encounter with the patient was supervised by Dr. Jennie Sutton who personally examined the patient and reviewed the plan. Cliff Fajardo MD  [facility] Sports Medicine Fellow      This dictation was performed with a verbal recognition program [facility]) and it was checked for errors.   It is possible that there are still dictated errors within this office note. If so, please bring any areas to my attention for an addendum. All efforts were made to ensure that this office note is accurate.

## 2023-02-20 ENCOUNTER — HOSPITAL ENCOUNTER (OUTPATIENT)
Dept: PHYSICAL THERAPY | Age: 63
Setting detail: THERAPIES SERIES
Discharge: HOME | End: 2023-02-20
Payer: MEDICAID

## 2023-02-20 PROCEDURE — 97110 THERAPEUTIC EXERCISES: CPT | Performed by: PHYSICAL THERAPIST

## 2023-02-20 PROCEDURE — 97140 MANUAL THERAPY 1/> REGIONS: CPT | Performed by: PHYSICAL THERAPIST

## 2023-02-20 NOTE — FLOWSHEET NOTE
[address] [facility],  [address]  Phone: [phone]   Fax:     [phone]                                                      Physical Therapy Daily Treatment Note  Date:  2023    Patient Name:  Donna Martínez    :  1960  MRN: [id number]  Restrictions/Precautions:    Medical/Treatment Diagnosis Information:  Diagnosis: M54.2 (ICD-10-CM) - Neck pain  Treatment Diagnosis: D59.6  Insurance/Certification information:  PT Insurance Information: [address]  Physician Information:  Dr. Max Thorne  Has the plan of care been signed (Y/N):        [x]  Yes  []  No     Date of Patient follow up with Physician:       Is this a Progress Report:     [x]  Yes  []  No        If Yes:  Date Range for reporting period:  Beginning   Ending     Progress report will be due (10 Rx or 30 days whichever is less):       Recertification will be due (POC Duration  / 90 days whichever is less):         Visit # Insurance Allowable Auth Required      16 visits -    16 visits -3/24 [x]  Yes []  No        Functional Scale: FOTO 53    Date assessed:       Latex Allergy:  [x]NO      []YES  Preferred Language for Healthcare:   [x]English       []other:      Pain level:  2/10 ()     SUBJECTIVE:  Pt reports his neck is feeling good, but most of his pain is now just in the shoulder.       OBJECTIVE:   Observation:   Test measurements:       ROM  AROM Comments   Flexion 55     Extension 30     Side bend R 10  \"tight\"   Side bend L 10  \"tight\"   Rotation R Mod limited  \"Crunching\"   Rotation L Min limited  \"Crunching\"    Shoulder AROM clearing R WFL   PROM of L shoulder - L flexion 130 (P!), abd 90 (P!), IR ~20 @ 50 p!, ER 25 deg @ 50 deg abd p!                   Strength  L R Comments   Neck flexion (C1-2)         Neck side bend (C3)         Shoulder elevation (C4) NT 5 Shoulder abduction (C5) NT 5     Elbow flexion and /or wrist extension  (C6) 4- 5     Elbow extension and/or wrist flexion  (C7) 4- 5     Thumb extension and/or ulnar deviation ((C8)         Abduction and /or adduction of hand intrinsics (T1)         IR/ER  4-/3+ 5            RESTRICTIONS/PRECAUTIONS:     Exercises/Interventions:   Exercise/Equipment Resistance/Repetitions Other comments   Stretching/PROM     ER seated with cane  10x10\"  Added 2/20   Chin tuck Supine 10x10\"    UT side bend stretch     Levater scap stretch     Sleeper  10x10\"  Added 1/25   3 finger rotation  20x   Added 12/22   Pulleys  10x10\" flexion, scaption   Added 12/12   pendulums 30x CW, CCW    Table slide 10x10\" flex, scap Added 2/20        Isometrics     Retraction 3x10    Shrugs w/ bkwd roll 3x10    Cervical Flexion      Cervical Extension     Cervical sidebending          PRE's          Cable Column/Theraband     TRIC     Lats     Shrugs     Flex     BIC     PNF     No money 3x10         Manual intervention PROM of L shoulder in all directions with OP Added 2/20       Therapeutic Exercise and NMR EXR  [x] (27993) Provided verbal/tactile cueing for activities related to strengthening, flexibility, endurance, ROM  for improvements in cervical, postural, scapular, scapulothoracic and UE control with self care, reaching, carrying, lifting, house/yardwork, driving/computer work.    [] (54012) Provided verbal/tactile cueing for activities related to improving balance, coordination, kinesthetic sense, posture, motor skill, proprioception  to assist with cervical, scapular, scapulothoracic and UE control with self care, reaching, carrying, lifting, house/yardwork, driving/computer work.     Therapeutic Activities:    [x] (30263 or 67870) Provided verbal/tactile cueing for activities related to improving balance, coordination, kinesthetic sense, posture, motor skill, proprioception and motor activation to allow for proper function of cervical, scapular, scapulothoracic and UE control with self care, carrying, lifting, driving/computer work. Home Exercise Program:    [x] (80341) Reviewed/Progressed HEP activities related to strengthening, flexibility, endurance, ROM of cervical, scapular, scapulothoracic and UE control with self care, reaching, carrying, lifting, house/yardwork, driving/computer work  [] (93917) Reviewed/Progressed HEP activities related to improving balance, coordination, kinesthetic sense, posture, motor skill, proprioception of cervical, scapular, scapulothoracic and UE control with self care, reaching, carrying, lifting, house/yardwork, driving/computer work      Manual Treatments:  PROM / STM / Oscillations-Mobs:  G-I, II, III, IV (PA's, Inf., Post.)  [x] (55497) Provided manual therapy to mobilize soft tissue/joints of cervical/CT, scapular GHJ and UE for the purpose of decreasing headache, modulating pain, promoting relaxation,  increasing ROM, reducing/eliminating soft tissue swelling/inflammation/restriction, improving soft tissue extensibility and allowing for proper ROM for normal function with self care, reaching, carrying, lifting, house/yardwork, driving/computer work    Modalities:      Charges:  Timed Code Treatment Minutes: 40   Total Treatment Minutes: 1:06-1:46       [] EVAL (LOW) 46418 (typically 20 minutes face-to-face)  [] EVAL (MOD) 94804 (typically 30 minutes face-to-face)  [] EVAL (HIGH) 80896 (typically 45 minutes face-to-face)  [] RE-EVAL     [x] DA(77683) x 2  [] IONTO  [] NMR (69492) x      [] VASO  [x] Manual (50531) x 1      [] Other:  [] TA x      [] Mech Traction (45939)  [] ES(attended) (89476)      [] ES (un) (16344):     HEP instruction:   Access Code: [email or web address]  URL: [email or web address]  Date: 12/08/2022  Prepared by: Alfred Mayer      GOALS:   Patient stated goal: Get rid of pain and gain normal function    [x] Progressing: [] Met: [] Not Met: [] Adjusted    Therapist goals for Patient: Short Term Goals: To be achieved in: 2 weeks  1. Independent in HEP and progression per patient tolerance, in order to prevent re-injury. [x] Progressing: Pt still requires verbal and tactile cues [] Met: [] Not Met: [] Adjusted   2. Patient will have a decrease in pain to facilitate improvement in movement, function, and ADLs as indicated by Functional Deficits. [] Progressing: [x] Met: [] Not Met: [] Adjusted    Long Term Goals: To be achieved in: 8-10 weeks  1. Improve FOTO score to at least 66 to assist with reaching prior level of function. [] Progressing: [] Met: [x] Not Met: [] Adjusted  2. Patient will demonstrate increased AROM to WNL of cervical/thoracic spine and L UE ROM to allow for proper joint functioning as indicated by patients Functional Deficits. [x] Progressing: [] Met: [] Not Met: [] Adjusted  Patient will demonstrate an increase in postural awareness and control and activation of  Deep cervical stabilizers to allow for proper functional mobility as indicated by patients Functional Deficits. [x] Progressing: [] Met: [] Not Met: [] Adjusted  4. Patient will return to daily functional activities without increased symptoms or restriction. [x] Progressing: [] Met: [] Not Met: [] Adjusted  5. Patient will be able to hunt without increased symptoms or restriction. [x] Progressing: [] Met: [] Not Met: [] Adjusted    Overall Progression Towards Functional goals/ Treatment Progress Update:  [] Patient is progressing as expected towards functional goals listed. [] Progression is slowed due to complexities/Impairments listed. [] Progression has been slowed due to co-morbidities.   [] Plan just implemented, too soon to assess goals progression <30days   [] Goals require adjustment due to lack of progress  [] Patient is not progressing as expected and requires additional follow up with physician  [x] Other: Pt presents with slow progress - continues to need significant cues on his HEP.  Neck pain is more manageable and symptoms tend to be more shoulder related. Focus on RC and scapular strength. 1/25    Prognosis for POC: [x] Good [] Fair  [] Poor      Patient requires continued skilled intervention: [x] Yes  [] No    Treatment/Activity Tolerance:  [x] Patient able to complete treatment  [] Patient limited by fatigue  [] Patient limited by pain     [] Patient limited by other medical complications  [x] Other: Pt required frequent verbal and tactile cues needed during session to improve quality of movements and improve postural awareness. Focus on shoulder ROM and gradually work in strengthening. 2/20                  Patient Education:  12/8 Patient education on PT and plan of care including diagnosis, prognosis, treatment goals and options. Patient agrees with discussed POC and treatment and is aware of rehab process. Pt was also educated on clinic layout and use of modalities. 12/29 Discussed importance of doing HEP in order to see improvements         PLAN: 2x per week for 4 weeks. 1/25  [x] Continue per plan of care [] Alter current plan (see comments above)  [] Plan of care initiated [] Hold pending MD visit [] Discharge      Electronically signed by:  Anton Givens PT    Note: If patient does not return for scheduled/ recommended follow up visits, this note will serve as a discharge from care along with most recent update on progress.

## 2023-02-27 ENCOUNTER — TELEPHONE (OUTPATIENT)
Dept: PRIMARY CARE CLINIC | Age: 63
End: 2023-02-27

## 2023-02-27 NOTE — TELEPHONE ENCOUNTER
Pt wife called and wants a script for Vitamin E for pt.  Lov  12/7/22    [address] [facility], [address] [facility]   [address] [place] [address]   Phone:  [phone]  Fax:  [phone]

## 2023-02-28 ENCOUNTER — HOSPITAL ENCOUNTER (OUTPATIENT)
Dept: PHYSICAL THERAPY | Age: 63
Setting detail: THERAPIES SERIES
Discharge: HOME | End: 2023-02-28
Payer: MEDICAID

## 2023-02-28 PROCEDURE — 97110 THERAPEUTIC EXERCISES: CPT | Performed by: PHYSICAL THERAPIST

## 2023-02-28 PROCEDURE — 97140 MANUAL THERAPY 1/> REGIONS: CPT | Performed by: PHYSICAL THERAPIST

## 2023-02-28 NOTE — FLOWSHEET NOTE
[address] [facility], [facility] [employer] [address], [address] [place] [address]  Phone: [phone]   Fax:     [phone]                                                      Physical Therapy Daily Treatment Note  Date:  2023    Patient Name:  Mikey Pabon    :  1960  MRN: [id number]  Restrictions/Precautions:    Medical/Treatment Diagnosis Information:  Diagnosis: M54.2 (ICD-10-CM) - Neck pain  Treatment Diagnosis: U23.6  Insurance/Certification information:  PT Insurance Information: [address]  Physician Information:  Dr. Pacheco Tom  Has the plan of care been signed (Y/N):        [x]  Yes  []  No     Date of Patient follow up with Physician:       Is this a Progress Report:     [x]  Yes  []  No        If Yes:  Date Range for reporting period:  Beginning   Ending     Progress report will be due (10 Rx or 30 days whichever is less):       Recertification will be due (POC Duration  / 90 days whichever is less):         Visit # Insurance Allowable Auth Required   10     16 visits -    16 visits -3/24 [x]  Yes []  No        Functional Scale: FOTO 53    Date assessed:       Latex Allergy:  [x]NO      []YES  Preferred Language for Healthcare:   [x]English       []other:      Pain level:  2/10 ()     SUBJECTIVE: very stiff and tight     OBJECTIVE:   Observation:   Test measurements:       ROM  AROM Comments   Flexion 55     Extension 30     Side bend R 10  \"tight\"   Side bend L 10  \"tight\"   Rotation R Mod limited  \"Crunching\"   Rotation L Min limited  \"Crunching\"    Shoulder AROM clearing R WFL   PROM of L shoulder - L flexion 130 (P!), abd 90 (P!), IR ~20 @ 50 p!, ER 25 deg @ 50 deg abd p!                   Strength  L R Comments   Neck flexion (C1-2)         Neck side bend (C3)         Shoulder elevation (C4) NT 5     Shoulder abduction (C5) NT 5     Elbow flexion and /or wrist extension  (C6) 4- 5     Elbow extension and/or wrist flexion  (C7) 4- 5     Thumb extension and/or ulnar deviation ((C8)         Abduction and /or adduction of hand intrinsics (T1)         IR/ER  4-/3+ 5            RESTRICTIONS/PRECAUTIONS:     Exercises/Interventions:   Exercise/Equipment Resistance/Repetitions Other comments   Stretching/PROM     ER seated with cane  10x10\"  Added 2/20   Chin tuck Supine 10x10\"    UT side bend stretch     Levater scap stretch     Sleeper  10x10\"  Added 1/25   3 finger rotation  20x   Added 12/22   Pulleys  10x10\" flexion, scaption   Added 12/12   pendulums 30x CW, CCW    Table slide 10x10\" flex, scap, ER Added ER 2/28   Hands on hips 10x10\"  Added 2/28   Supine cross body stretch 10x10\" Added 2/28        Isometrics     Retraction 3x10    Shrugs w/ bkwd roll 3x10    Cervical Flexion      Cervical Extension     Cervical sidebending          PRE's          Cable Column/Theraband     TRIC     Lats     Shrugs     Flex     BIC     PNF     No money 3x10         Manual intervention PROM of L shoulder in all directions with OP Added 2/20       Therapeutic Exercise and NMR EXR  [x] (68402) Provided verbal/tactile cueing for activities related to strengthening, flexibility, endurance, ROM  for improvements in cervical, postural, scapular, scapulothoracic and UE control with self care, reaching, carrying, lifting, house/yardwork, driving/computer work.    [] (53002) Provided verbal/tactile cueing for activities related to improving balance, coordination, kinesthetic sense, posture, motor skill, proprioception  to assist with cervical, scapular, scapulothoracic and UE control with self care, reaching, carrying, lifting, house/yardwork, driving/computer work.     Therapeutic Activities:    [x] (14060 or 67081) Provided verbal/tactile cueing for activities related to improving balance, coordination, kinesthetic sense, posture, motor skill, proprioception and motor activation to allow for proper function of cervical, scapular, scapulothoracic and UE control with self care, carrying, lifting, driving/computer work. Home Exercise Program:    [x] (19694) Reviewed/Progressed HEP activities related to strengthening, flexibility, endurance, ROM of cervical, scapular, scapulothoracic and UE control with self care, reaching, carrying, lifting, house/yardwork, driving/computer work  [] (34869) Reviewed/Progressed HEP activities related to improving balance, coordination, kinesthetic sense, posture, motor skill, proprioception of cervical, scapular, scapulothoracic and UE control with self care, reaching, carrying, lifting, house/yardwork, driving/computer work      Manual Treatments:  PROM / STM / Oscillations-Mobs:  G-I, II, III, IV (PA's, Inf., Post.)  [x] (16757) Provided manual therapy to mobilize soft tissue/joints of cervical/CT, scapular GHJ and UE for the purpose of decreasing headache, modulating pain, promoting relaxation,  increasing ROM, reducing/eliminating soft tissue swelling/inflammation/restriction, improving soft tissue extensibility and allowing for proper ROM for normal function with self care, reaching, carrying, lifting, house/yardwork, driving/computer work    Modalities:  ice 10'     Charges:  Timed Code Treatment Minutes: 42   Total Treatment Minutes: 9:46-10:38 (52)       [] EVAL (LOW) 53205 (typically 20 minutes face-to-face)  [] EVAL (MOD) 80941 (typically 30 minutes face-to-face)  [] EVAL (HIGH) 45559 (typically 45 minutes face-to-face)  [] RE-EVAL     [x] EP(98370) x 2  [] IONTO  [] NMR (77398) x      [] VASO  [x] Manual (93740) x 1      [] Other:  [] TA x      [] Mech Traction (09355)  [] ES(attended) (56026)      [] ES (un) (81974):     HEP instruction:   Access Code: [email or web address]  URL: [email or web address]  Date: 12/08/2022  Prepared by: Alex Crawford      GOALS:   Patient stated goal: Get rid of pain and gain normal function    [x] Progressing: [] Met: [] Not Met: [] Adjusted    Therapist goals for Patient:   Short Term Goals: To be achieved in: 2 weeks  1. Independent in HEP and progression per patient tolerance, in order to prevent re-injury. [x] Progressing: Pt still requires verbal and tactile cues [] Met: [] Not Met: [] Adjusted   2. Patient will have a decrease in pain to facilitate improvement in movement, function, and ADLs as indicated by Functional Deficits. [] Progressing: [x] Met: [] Not Met: [] Adjusted    Long Term Goals: To be achieved in: 8-10 weeks  1. Improve FOTO score to at least 66 to assist with reaching prior level of function. [] Progressing: [] Met: [x] Not Met: [] Adjusted  2. Patient will demonstrate increased AROM to WNL of cervical/thoracic spine and L UE ROM to allow for proper joint functioning as indicated by patients Functional Deficits. [x] Progressing: [] Met: [] Not Met: [] Adjusted  Patient will demonstrate an increase in postural awareness and control and activation of  Deep cervical stabilizers to allow for proper functional mobility as indicated by patients Functional Deficits. [x] Progressing: [] Met: [] Not Met: [] Adjusted  4. Patient will return to daily functional activities without increased symptoms or restriction. [x] Progressing: [] Met: [] Not Met: [] Adjusted  5. Patient will be able to hunt without increased symptoms or restriction. [x] Progressing: [] Met: [] Not Met: [] Adjusted    Overall Progression Towards Functional goals/ Treatment Progress Update:  [] Patient is progressing as expected towards functional goals listed. [] Progression is slowed due to complexities/Impairments listed. [] Progression has been slowed due to co-morbidities.   [] Plan just implemented, too soon to assess goals progression <30days   [] Goals require adjustment due to lack of progress  [] Patient is not progressing as expected and requires additional follow up with physician  [x] Other: Pt presents with slow progress - continues to need significant cues on his HEP. Neck pain is more manageable and symptoms tend to be more shoulder related. Focus on RC and scapular strength. 1/25    Prognosis for POC: [x] Good [] Fair  [] Poor      Patient requires continued skilled intervention: [x] Yes  [] No    Treatment/Activity Tolerance:  [x] Patient able to complete treatment  [] Patient limited by fatigue  [] Patient limited by pain     [] Patient limited by other medical complications  [x] Other: continue to stress importance of performing stretches several times per day. Focus on ROM, especially internal and external rotation. 2/28                  Patient Education:  12/8 Patient education on PT and plan of care including diagnosis, prognosis, treatment goals and options. Patient agrees with discussed POC and treatment and is aware of rehab process. Pt was also educated on clinic layout and use of modalities. 12/29 Discussed importance of doing HEP in order to see improvements         PLAN: 2x per week for 4 weeks. 1/25  [x] Continue per plan of care [] Alter current plan (see comments above)  [] Plan of care initiated [] Hold pending MD visit [] Discharge      Electronically signed by:  Doug Schilling PT    Note: If patient does not return for scheduled/ recommended follow up visits, this note will serve as a discharge from care along with most recent update on progress.

## 2023-03-01 RX ORDER — VITAMIN E 268 MG
400 CAPSULE ORAL 2 TIMES DAILY
Qty: 30 CAPSULE | Refills: 3 | Status: SHIPPED | OUTPATIENT
Start: 2023-03-01

## 2023-03-02 ENCOUNTER — HOSPITAL ENCOUNTER (OUTPATIENT)
Dept: PHYSICAL THERAPY | Age: 63
Setting detail: THERAPIES SERIES
Discharge: HOME | End: 2023-03-02
Payer: MEDICAID

## 2023-03-02 PROCEDURE — 97110 THERAPEUTIC EXERCISES: CPT | Performed by: PHYSICAL THERAPIST

## 2023-03-02 PROCEDURE — 97140 MANUAL THERAPY 1/> REGIONS: CPT | Performed by: PHYSICAL THERAPIST

## 2023-03-02 NOTE — FLOWSHEET NOTE
[address] [facility], [facility] [address], [address] [place] [address]  Phone: [phone]   Fax:     [phone]                                                      Physical Therapy Daily Treatment Note  Date:  3/2/2023    Patient Name:  Massiel Gardner    :  1960  MRN: [id number]  Restrictions/Precautions:    Medical/Treatment Diagnosis Information:  Diagnosis: M54.2 (ICD-10-CM) - Neck pain  Treatment Diagnosis: M18.2  Insurance/Certification information:  PT Insurance Information: [address] [place] [facility]  Physician Information:  Dr. Elizabeth Mays  Has the plan of care been signed (Y/N):        [x]  Yes  []  No     Date of Patient follow up with Physician:       Is this a Progress Report:     [x]  Yes  []  No        If Yes:  Date Range for reporting period:  Beginning   Ending     Progress report will be due (10 Rx or 30 days whichever is less):       Recertification will be due (POC Duration  / 90 days whichever is less):         Visit # Insurance Allowable Auth Required   11  3/2   16 visits -    16 visits -3/24 [x]  Yes []  No        Functional Scale: FOTO 53    Date assessed:       Latex Allergy:  [x]NO      []YES  Preferred Language for Healthcare:   [x]English       []other:      Pain level:  4/10 (3)     SUBJECTIVE:  Pt reports his shoulder is very stiff.  3/2    OBJECTIVE:   Observation:   Test measurements:       ROM  AROM Comments   Flexion 55     Extension 30     Side bend R 10  \"tight\"   Side bend L 10  \"tight\"   Rotation R Mod limited  \"Crunching\"   Rotation L Min limited  \"Crunching\"    Shoulder AROM clearing R WFL   PROM of L shoulder - L flexion 130 (P!), abd 90 (P!), IR ~20 @ 50 p!, ER 25 deg @ 50 deg abd p!  2/                 Strength  L R Comments   Neck flexion (C1-2)         Neck side bend (C3)         Shoulder elevation (C4) NT 5     Shoulder abduction (C5) NT 5     Elbow flexion and /or wrist extension  (C6) 4- 5     Elbow extension and/or wrist flexion  (C7) 4- 5     Thumb extension and/or ulnar deviation ((C8)         Abduction and /or adduction of hand intrinsics (T1)         IR/ER  4-/3+ 5            RESTRICTIONS/PRECAUTIONS:     Exercises/Interventions:   Exercise/Equipment Resistance/Repetitions Other comments   Stretching/PROM     ER seated with cane  10x10\"  Added 2/20   Chin tuck Supine 10x10\"    UT side bend stretch     Levater scap stretch     Sleeper  10x10\"  Added 1/25   3 finger rotation  20x   Added 12/22   Pulleys  10x10\" flexion, scaption   Added 12/12   pendulums 30x CW, CCW    Table slide 10x10\" flex, scap, ER Added ER 2/28   Hands on hips 10x10\"  Added 2/28   Supine cross body stretch 10x10\" Added 2/28        Isometrics     Retraction 3x10    Shrugs w/ bkwd roll 3x10    Cervical Flexion      Cervical Extension     Cervical sidebending          PRE's          Cable Column/Theraband     TRIC     Lats     Shrugs     Flex     BIC     PNF     No money 3x10         Manual intervention PROM of L shoulder in all directions with OP Added 2/20       Therapeutic Exercise and NMR EXR  [x] (51172) Provided verbal/tactile cueing for activities related to strengthening, flexibility, endurance, ROM  for improvements in cervical, postural, scapular, scapulothoracic and UE control with self care, reaching, carrying, lifting, house/yardwork, driving/computer work.    [] (01476) Provided verbal/tactile cueing for activities related to improving balance, coordination, kinesthetic sense, posture, motor skill, proprioception  to assist with cervical, scapular, scapulothoracic and UE control with self care, reaching, carrying, lifting, house/yardwork, driving/computer work.     Therapeutic Activities:    [x] (47659 or 84202) Provided verbal/tactile cueing for activities related to improving balance, coordination, kinesthetic sense, posture, motor skill, proprioception and motor activation to allow for proper function of cervical, scapular, scapulothoracic and UE control with self care, carrying, lifting, driving/computer work. Home Exercise Program:    [x] (67306) Reviewed/Progressed HEP activities related to strengthening, flexibility, endurance, ROM of cervical, scapular, scapulothoracic and UE control with self care, reaching, carrying, lifting, house/yardwork, driving/computer work  [] (54749) Reviewed/Progressed HEP activities related to improving balance, coordination, kinesthetic sense, posture, motor skill, proprioception of cervical, scapular, scapulothoracic and UE control with self care, reaching, carrying, lifting, house/yardwork, driving/computer work      Manual Treatments:  PROM / STM / Oscillations-Mobs:  G-I, II, III, IV (PA's, Inf., Post.)  [x] (34377) Provided manual therapy to mobilize soft tissue/joints of cervical/CT, scapular GHJ and UE for the purpose of decreasing headache, modulating pain, promoting relaxation,  increasing ROM, reducing/eliminating soft tissue swelling/inflammation/restriction, improving soft tissue extensibility and allowing for proper ROM for normal function with self care, reaching, carrying, lifting, house/yardwork, driving/computer work    Modalities:  ice 10'     Charges:  Timed Code Treatment Minutes: 45   Total Treatment Minutes: 9:30-10:25       [] EVAL (LOW) 40589 (typically 20 minutes face-to-face)  [] EVAL (MOD) 84996 (typically 30 minutes face-to-face)  [] EVAL (HIGH) 94669 (typically 45 minutes face-to-face)  [] RE-EVAL     [x] KW(63273) x 2  [] IONTO  [] NMR (70883) x      [] VASO  [x] Manual (05829) x 1      [] Other:  [] TA x      [] Mech Traction (72564)  [] ES(attended) (89267)      [] ES (un) (13711):     HEP instruction:   Access Code: [email or web address]  URL: [email or web address]  Date: 12/08/2022  Prepared by: Kenisha       GOALS:   Patient stated goal: Get rid of pain and gain normal function    [x] Progressing: [] Met: [] Not Met: [] Adjusted    Therapist goals for Patient:   Short Term Goals: To be achieved in: 2 weeks  1. Independent in HEP and progression per patient tolerance, in order to prevent re-injury. [x] Progressing: Pt still requires verbal and tactile cues [] Met: [] Not Met: [] Adjusted   2. Patient will have a decrease in pain to facilitate improvement in movement, function, and ADLs as indicated by Functional Deficits. [] Progressing: [x] Met: [] Not Met: [] Adjusted    Long Term Goals: To be achieved in: 8-10 weeks  1. Improve FOTO score to at least 66 to assist with reaching prior level of function. [] Progressing: [] Met: [x] Not Met: [] Adjusted  2. Patient will demonstrate increased AROM to WNL of cervical/thoracic spine and L UE ROM to allow for proper joint functioning as indicated by patients Functional Deficits. [x] Progressing: [] Met: [] Not Met: [] Adjusted  Patient will demonstrate an increase in postural awareness and control and activation of  Deep cervical stabilizers to allow for proper functional mobility as indicated by patients Functional Deficits. [x] Progressing: [] Met: [] Not Met: [] Adjusted  4. Patient will return to daily functional activities without increased symptoms or restriction. [x] Progressing: [] Met: [] Not Met: [] Adjusted  5. Patient will be able to hunt without increased symptoms or restriction. [x] Progressing: [] Met: [] Not Met: [] Adjusted    Overall Progression Towards Functional goals/ Treatment Progress Update:  [] Patient is progressing as expected towards functional goals listed. [] Progression is slowed due to complexities/Impairments listed. [] Progression has been slowed due to co-morbidities.   [] Plan just implemented, too soon to assess goals progression <30days   [] Goals require adjustment due to lack of progress  [] Patient is not progressing as expected and requires additional follow up with physician  [x] Other: Pt presents with slow progress - continues to need significant cues on his HEP. Neck pain is more manageable and symptoms tend to be more shoulder related. Focus on RC and scapular strength. 1/25    Prognosis for POC: [x] Good [] Fair  [] Poor      Patient requires continued skilled intervention: [x] Yes  [] No    Treatment/Activity Tolerance:  [x] Patient able to complete treatment  [] Patient limited by fatigue  [] Patient limited by pain     [] Patient limited by other medical complications  [x] Other: continue to stress importance of performing stretches several times per day. Focus on ROM, especially internal and external rotation. 3/2                  Patient Education:  12/8 Patient education on PT and plan of care including diagnosis, prognosis, treatment goals and options. Patient agrees with discussed POC and treatment and is aware of rehab process. Pt was also educated on clinic layout and use of modalities. 12/29 Discussed importance of doing HEP in order to see improvements         PLAN: 2x per week for 4 weeks. 1/25  [x] Continue per plan of care [] Alter current plan (see comments above)  [] Plan of care initiated [] Hold pending MD visit [] Discharge      Electronically signed by:  George Ashton PT    Note: If patient does not return for scheduled/ recommended follow up visits, this note will serve as a discharge from care along with most recent update on progress.

## 2023-03-06 ENCOUNTER — HOSPITAL ENCOUNTER (OUTPATIENT)
Dept: PHYSICAL THERAPY | Age: 63
Setting detail: THERAPIES SERIES
Discharge: HOME | End: 2023-03-06
Payer: MEDICAID

## 2023-03-06 PROCEDURE — 97140 MANUAL THERAPY 1/> REGIONS: CPT | Performed by: PHYSICAL THERAPIST

## 2023-03-06 PROCEDURE — 97110 THERAPEUTIC EXERCISES: CPT | Performed by: PHYSICAL THERAPIST

## 2023-03-06 NOTE — FLOWSHEET NOTE
[address] [facility], [facility] [place] [address], [address] [place] [address]  Phone: [phone]   Fax:     [phone]                                                      Physical Therapy Daily Treatment Note  Date:  3/6/2023    Patient Name:  Sonam Hernandez    :  1960  MRN: [id number]  Restrictions/Precautions:    Medical/Treatment Diagnosis Information:  Diagnosis: M54.2 (ICD-10-CM) - Neck pain  Treatment Diagnosis: X49.6  Insurance/Certification information:  PT Insurance Information: [facility]  Physician Information:  Dr. Blair Elam  Has the plan of care been signed (Y/N):        [x]  Yes  []  No     Date of Patient follow up with Physician:       Is this a Progress Report:     [x]  Yes  []  No        If Yes:  Date Range for reporting period:  Beginning   Ending     Progress report will be due (10 Rx or 30 days whichever is less):  NPV      Recertification will be due (POC Duration  / 90 days whichever is less):         Visit # Insurance Allowable Auth Required   12  3/6   16 visits -    16 visits -3/24 [x]  Yes []  No        Functional Scale: FOTO 53    Date assessed:       Latex Allergy:  [x]NO      []YES  Preferred Language for Healthcare:   [x]English       []other:      Pain level:  4/10 (3/)     SUBJECTIVE:  Scheduled follow up with MD to get a shot.  3/6    OBJECTIVE:   Observation:   Test measurements:       ROM  AROM Comments   Flexion 55     Extension 30     Side bend R 10  \"tight\"   Side bend L 10  \"tight\"   Rotation R Mod limited  \"Crunching\"   Rotation L Min limited  \"Crunching\"    Shoulder AROM clearing R WFL   PROM of L shoulder - L flexion 130 (P!), abd 90 (P!), IR ~20 @ 50 p!, ER 25 deg @ 50 deg abd p!                   Strength  L R Comments   Neck flexion (C1-2)         Neck side bend (C3)         Shoulder elevation (C4) NT 5     Shoulder abduction (C5) NT 5     Elbow flexion and /or wrist extension  (C6) 4- 5     Elbow extension and/or wrist flexion  (C7) 4- 5     Thumb extension and/or ulnar deviation ((C8)         Abduction and /or adduction of hand intrinsics (T1)         IR/ER  4-/3+ 5            RESTRICTIONS/PRECAUTIONS:     Exercises/Interventions:   Exercise/Equipment Resistance/Repetitions Other comments   Stretching/PROM     ER seated with cane  10x10\"  Added 2/20   Chin tuck Supine 10x10\"    UT side bend stretch     Levater scap stretch     Sleeper  10x10\"  Added 1/25   3 finger rotation  20x   Added 12/22   Pulleys  10x10\" flexion, scaption   Added 12/12   pendulums 30x CW, CCW    Table slide 10x10\" flex, scap, ER Added ER 2/28   Hands on hips 10x10\"  Added 2/28   Supine cross body stretch 10x10\" Added 2/28        Isometrics     Retraction 3x10    Shrugs w/ bkwd roll 3x10    Cervical Flexion      Cervical Extension     Cervical sidebending          PRE's          Cable Column/Theraband     TRIC     Lats     Shrugs     Flex     BIC     PNF     No money 3x10         Manual intervention PROM of L shoulder in all directions with OP Added 2/20       Therapeutic Exercise and NMR EXR  [x] (79328) Provided verbal/tactile cueing for activities related to strengthening, flexibility, endurance, ROM  for improvements in cervical, postural, scapular, scapulothoracic and UE control with self care, reaching, carrying, lifting, house/yardwork, driving/computer work.    [] (67845) Provided verbal/tactile cueing for activities related to improving balance, coordination, kinesthetic sense, posture, motor skill, proprioception  to assist with cervical, scapular, scapulothoracic and UE control with self care, reaching, carrying, lifting, house/yardwork, driving/computer work.     Therapeutic Activities:    [x] (84537 or 62068) Provided verbal/tactile cueing for activities related to improving balance, coordination, kinesthetic sense, posture, motor skill, proprioception and motor activation to allow for proper function of cervical, scapular, scapulothoracic and UE control with self care, carrying, lifting, driving/computer work. Home Exercise Program:    [x] (01028) Reviewed/Progressed HEP activities related to strengthening, flexibility, endurance, ROM of cervical, scapular, scapulothoracic and UE control with self care, reaching, carrying, lifting, house/yardwork, driving/computer work  [] (12896) Reviewed/Progressed HEP activities related to improving balance, coordination, kinesthetic sense, posture, motor skill, proprioception of cervical, scapular, scapulothoracic and UE control with self care, reaching, carrying, lifting, house/yardwork, driving/computer work      Manual Treatments:  PROM / STM / Oscillations-Mobs:  G-I, II, III, IV (PA's, Inf., Post.)  [x] (19419) Provided manual therapy to mobilize soft tissue/joints of cervical/CT, scapular GHJ and UE for the purpose of decreasing headache, modulating pain, promoting relaxation,  increasing ROM, reducing/eliminating soft tissue swelling/inflammation/restriction, improving soft tissue extensibility and allowing for proper ROM for normal function with self care, reaching, carrying, lifting, house/yardwork, driving/computer work    Modalities:  ice 10'     Charges:  Timed Code Treatment Minutes: 40   Total Treatment Minutes: 9:45-10:32       [] EVAL (LOW) 34686 (typically 20 minutes face-to-face)  [] EVAL (MOD) 68692 (typically 30 minutes face-to-face)  [] EVAL (HIGH) 95069 (typically 45 minutes face-to-face)  [] RE-EVAL     [x] TU(40906) x 2  [] IONTO  [] NMR (44461) x      [] VASO  [x] Manual (10209) x 1      [] Other:  [] TA x      [] Mech Traction (11329)  [] ES(attended) (18897)      [] ES (un) (12996):     HEP instruction:   Access Code: [email or web address]  URL: [email or web address]  Date: 12/08/2022  Prepared by: Eveleen Severin      GOALS:   Patient stated goal: Get rid of pain and gain normal function    [x] Progressing: [] Met: [] Not Met: [] Adjusted    Therapist goals for Patient:   Short Term Goals: To be achieved in: 2 weeks  1. Independent in HEP and progression per patient tolerance, in order to prevent re-injury. [x] Progressing: Pt still requires verbal and tactile cues [] Met: [] Not Met: [] Adjusted   2. Patient will have a decrease in pain to facilitate improvement in movement, function, and ADLs as indicated by Functional Deficits. [] Progressing: [x] Met: [] Not Met: [] Adjusted    Long Term Goals: To be achieved in: 8-10 weeks  1. Improve FOTO score to at least 66 to assist with reaching prior level of function. [] Progressing: [] Met: [x] Not Met: [] Adjusted  2. Patient will demonstrate increased AROM to WNL of cervical/thoracic spine and L UE ROM to allow for proper joint functioning as indicated by patients Functional Deficits. [x] Progressing: [] Met: [] Not Met: [] Adjusted  Patient will demonstrate an increase in postural awareness and control and activation of  Deep cervical stabilizers to allow for proper functional mobility as indicated by patients Functional Deficits. [x] Progressing: [] Met: [] Not Met: [] Adjusted  4. Patient will return to daily functional activities without increased symptoms or restriction. [x] Progressing: [] Met: [] Not Met: [] Adjusted  5. Patient will be able to hunt without increased symptoms or restriction. [x] Progressing: [] Met: [] Not Met: [] Adjusted    Overall Progression Towards Functional goals/ Treatment Progress Update:  [] Patient is progressing as expected towards functional goals listed. [] Progression is slowed due to complexities/Impairments listed. [] Progression has been slowed due to co-morbidities.   [] Plan just implemented, too soon to assess goals progression <30days   [] Goals require adjustment due to lack of progress  [] Patient is not progressing as expected and requires additional follow up with physician  [x] Other: Pt presents with slow progress - continues to need significant cues on his HEP. Neck pain is more manageable and symptoms tend to be more shoulder related. Focus on RC and scapular strength. 1/25    Prognosis for POC: [x] Good [] Fair  [] Poor      Patient requires continued skilled intervention: [x] Yes  [] No    Treatment/Activity Tolerance:  [x] Patient able to complete treatment  [] Patient limited by fatigue  [] Patient limited by pain     [] Patient limited by other medical complications  [x] Other: continue to stress importance of performing stretches several times per day. Focus on ROM, especially internal and external rotation. 3/6                  Patient Education:  12/8 Patient education on PT and plan of care including diagnosis, prognosis, treatment goals and options. Patient agrees with discussed POC and treatment and is aware of rehab process. Pt was also educated on clinic layout and use of modalities. 12/29 Discussed importance of doing HEP in order to see improvements         PLAN: 2x per week for 4 weeks. [x] Continue per plan of care [] Alter current plan (see comments above)  [] Plan of care initiated [] Hold pending MD visit [] Discharge      Electronically signed by:  Naomy Angel PT    Note: If patient does not return for scheduled/ recommended follow up visits, this note will serve as a discharge from care along with most recent update on progress.

## 2023-03-07 ENCOUNTER — OFFICE VISIT (OUTPATIENT)
Dept: ORTHOPEDIC SURGERY | Age: 63
End: 2023-03-07

## 2023-03-07 VITALS — HEIGHT: 72 IN | BODY MASS INDEX: 27.63 KG/M2 | WEIGHT: 204 LBS

## 2023-03-07 DIAGNOSIS — M75.02 ADHESIVE CAPSULITIS OF LEFT SHOULDER: Primary | ICD-10-CM

## 2023-03-07 RX ORDER — METHYLPREDNISOLONE ACETATE 40 MG/ML
40 INJECTION, SUSPENSION INTRA-ARTICULAR; INTRALESIONAL; INTRAMUSCULAR; SOFT TISSUE ONCE
Status: COMPLETED | OUTPATIENT
Start: 2023-03-07 | End: 2023-03-07

## 2023-03-07 RX ADMIN — METHYLPREDNISOLONE ACETATE 40 MG: 40 INJECTION, SUSPENSION INTRA-ARTICULAR; INTRALESIONAL; INTRAMUSCULAR; SOFT TISSUE at 17:50

## 2023-03-07 NOTE — PROGRESS NOTES
Chief Complaint    Follow-up (Left shoulder. )      History of Present Illness:  Wanda Albarran is a 60 y.o. male here today for follow up evaluation of the left shoulder. He has history of left-sided stroke 6 months ago. We have been treating his conservatively for post stroke adhesive capsulitis consistent of formal, supervised physical therapy. He complains he has painful and stiff range of motion of the left shoulder however it is improved since originally suffering the stroke. Denies any new injuries. Medical History:  Patient's medications, allergies, past medical, surgical, social and family histories were reviewed and updated as appropriate. Pertinent items are noted in HPI  Review of systems reviewed from Patient History Form completed today and available in the patient's chart under the Media tab. Pain Assessment  Location of Pain: Shoulder  Location Modifiers: Left  Severity of Pain: 0  Quality of Pain:  (n/a)  Duration of Pain:  (n/a)  Frequency of Pain:  (n/a)  Aggravating Factors:  (raising arm)  Limiting Behavior: Yes  Relieving Factors: Rest  Result of Injury: Yes  Work-Related Injury: No  Are there other pain locations you wish to document?: No    Past Medical History:   Diagnosis Date    Allergic rhinitis 10/11/2012    Cerebrovascular accident (CVA) due to occlusion of right cerebellar artery ([facility] [address] 07/25/2022    [facility]  /    HTN (hypertension) 10/11/2012    Hyperlipidemia     Sleep apnea     Stroke (cerebrum) ([facility] [address] 07/25/2022    Type 2 diabetes mellitus ([facility] [address] 08/10/2022        Past Surgical History:   Procedure Laterality Date    CARDIOVASCULAR STRESS TEST  Oct 2012    JHK, negative GXT    COLONOSCOPY N/A 2/3/2023    COLONOSCOPY performed by Rashmi Keller MD at [facility] ENDOSCOPY       Family History   Problem Relation Age of Onset    Cancer Mother         breast ca.   @age 68    Diabetes Father         @age 76       Social History     Socioeconomic History    Marital status:      Spouse name: None    Number of children: 1    Years of education: None    Highest education level: None   Occupational History    Occupation: labor   Tobacco Use    Smoking status: Former     Packs/day: 1.00     Years: 15.00     Pack years: 15.00     Types: Cigarettes     Quit date:      Years since quittin.1    Smokeless tobacco: Former     Types: Chew   Vaping Use    Vaping Use: Never used   Substance and Sexual Activity    Alcohol use: No    Drug use: Never   Social History Narrative    He was  from wife    SS  since Dec    Covered under insurance of his wife     Dip nicotine 2 can/day Alcohol None     Social Determinants of Health     Financial Resource Strain: Low Risk     Difficulty of Paying Living Expenses: Not hard at all   Food Insecurity: No Food Insecurity    Worried About Running Out of Food in the Last Year: Never true    [address] [facility] [address] in the Last Year: Never true       Current Outpatient Medications   Medication Sig Dispense Refill    vitamin E 400 UNIT capsule Take 1 capsule by mouth 2 times daily 30 capsule 3    glipiZIDE (GLUCOTROL) 5 MG tablet TAKE 1 TABLET BY MOUTH TWICE DAILY BEFORE MEAL(S) 60 tablet 2    clopidogrel (PLAVIX) 75 MG tablet Take 1 tablet by mouth once daily 30 tablet 5    traZODone (DESYREL) 100 MG tablet Take 1 tablet by mouth nightly 30 tablet 2    NIFEdipine (PROCARDIA XL) 30 MG extended release tablet Take 1 tablet by mouth once daily 30 tablet 5    atorvastatin (LIPITOR) 80 MG tablet Take 1 tablet by mouth once daily 30 tablet 5    SITagliptin (JANUVIA) 100 MG tablet Take 1 tablet by mouth daily 90 tablet 1    tiZANidine (ZANAFLEX) 4 MG tablet Take 1 tablet by mouth 3 times daily as needed (Muscle tightness/spasm) 30 tablet 1    ipratropium (ATROVENT) 0.03 % nasal spray 2 sprays by Each Nostril route 3 times daily as needed for Rhinitis 30 mL 3    lisinopril (PRINIVIL;ZESTRIL) 40 MG tablet Take 1 tablet by mouth daily 30 tablet 2 NONFORMULARY TOTAL BEETS (blood pressure)      melatonin 3 MG TABS tablet Take 3 mg by mouth daily 2 tablet nightly 6mgh      nicotine (NICODERM CQ) 21 MG/24HR Place 1 patch onto the skin every 24 hours      aspirin EC 81 MG EC tablet Take 1 tablet by mouth daily. 30 tablet 3     No current facility-administered medications for this visit. No Known Allergies    Vital signs:  Ht 6' (1.829 m)   Wt 204 lb (92.5 kg)   BMI 27.67 kg/m²          Left shoulder examination:     Inspection:  Held in a normal posture. Normal contour at the acromioclavicular joint. No swelling, ecchymosis, or erythema about the shoulder. No atrophy appreciated. No scapular winging. Palpation:  No subacromial crepitus. No tenderness of the AC joint. No greater tuberosity tenderness. No tenderness in the bicipital groove. Range of Motion: Stiff shoulder decreased active and passive range of motion to about 90 degrees forward flexion, 90 degrees abduction, 0 degrees of internal and external rotation. Strength:  Rotator cuff weakness. Stability: No anterior instability. No posterior instability. Special Tests: Impingement findings are negative. Labral findings are negative. Speed sign and Yergason signs are both negative. Crossover sign is negative. Belly press sign is negative. Lift off sign is negative. Other findings: The skin is warm dry and well perfused. 2+ radial pulse. Sensation is intact to light touch over the deltoid. Radiology:     Pertinent imaging was interpreted and reviewed with the patient. No new imaging was obtained during today's visit. Assessment :  60 y.o. male with left shoulder adhesive capsulitis after left-sided stroke    Impression:  Encounter Diagnosis   Name Primary? Adhesive capsulitis of left shoulder Yes       Office Procedures:  No orders of the defined types were placed in this encounter. Plan: Pertinent imaging was reviewed.  The etiology, natural history, and treatment options for the disorder were discussed. The roles of activity medication, antiinflammatories, injections, bracing, physical therapy, and surgical interventions were all described to the patient and questions were answered. Patient has hit a plateau with his motion with physical therapy. I believe he is a candidate for a left shoulder corticosteroid injection for his exacerbated symptoms. He wishes to proceed. The indications and risks of steroid injection as well as treatment alternatives were discussed with the patient who consented to the procedure. Under sterile conditions and after informed consent was obtained, using posterior approach the patient was given an injection into the LEFT shoulder split equally between subacromial space and glenohumeral joint. 2mL 40 mg of Depo-Medrol and 4 mL of 1% lidocaine were placed in the shoulder after it was prepped with chlorhexidine. This resulted in good relief of symptoms. There were no complications. The patient was advised to ice the shoulder this evening and to avoid vigorous activities for the next 2 days. They were advised to call us if there was any erythema, enduration, swelling or increasing pain. I will see him back in 1 month for reevaluation, sooner if symptoms worsen. Rach Archuleta is in agreement with this plan. All questions were answered to patient's satisfaction and was encouraged to call with any further questions. Total time spent for evaluation, education and development of treatment plan: 35 minutes        IDianne ATC, am scribing for and in the presence of Dr. Carlene Nj. 03/07/23 5:18 PM Dianne Ndiaye ATC         I attest that I met personally with the patient, performed the described exam, reviewed the radiographic studies and medical records associated with this patient and supervised the services that are described above.      Denis Mcknight MD

## 2023-03-07 NOTE — PROGRESS NOTES
3/7/23 5:50 PM     Lidocaine Injection      NDC: 6093-5857-58    Lot Number: [id number]    Body Part: left shoulder

## 2023-03-08 ENCOUNTER — APPOINTMENT (OUTPATIENT)
Dept: PHYSICAL THERAPY | Age: 63
End: 2023-03-08
Payer: MEDICAID

## 2023-03-08 ENCOUNTER — OFFICE VISIT (OUTPATIENT)
Dept: PRIMARY CARE CLINIC | Age: 63
End: 2023-03-08
Payer: MEDICAID

## 2023-03-08 VITALS
TEMPERATURE: 98.5 F | WEIGHT: 210.8 LBS | BODY MASS INDEX: 29.51 KG/M2 | RESPIRATION RATE: 20 BRPM | SYSTOLIC BLOOD PRESSURE: 152 MMHG | HEART RATE: 70 BPM | HEIGHT: 71 IN | OXYGEN SATURATION: 97 % | DIASTOLIC BLOOD PRESSURE: 80 MMHG

## 2023-03-08 DIAGNOSIS — E11.69 TYPE 2 DIABETES MELLITUS WITH OTHER SPECIFIED COMPLICATION, WITHOUT LONG-TERM CURRENT USE OF INSULIN (HCC): Primary | ICD-10-CM

## 2023-03-08 DIAGNOSIS — I10 PRIMARY HYPERTENSION: ICD-10-CM

## 2023-03-08 DIAGNOSIS — N18.31 STAGE 3A CHRONIC KIDNEY DISEASE (HCC): ICD-10-CM

## 2023-03-08 DIAGNOSIS — Z86.73 HISTORY OF ARTERIAL ISCHEMIC STROKE: ICD-10-CM

## 2023-03-08 DIAGNOSIS — G47.9 SLEEP DISORDER: ICD-10-CM

## 2023-03-08 DIAGNOSIS — Z79.01 CHRONIC ANTICOAGULATION: ICD-10-CM

## 2023-03-08 DIAGNOSIS — E11.69 TYPE 2 DIABETES MELLITUS WITH OTHER SPECIFIED COMPLICATION, WITHOUT LONG-TERM CURRENT USE OF INSULIN (HCC): ICD-10-CM

## 2023-03-08 LAB
ALBUMIN SERPL-MCNC: 4.8 G/DL (ref 3.4–5)
ALP BLD-CCNC: 71 U/L (ref 40–129)
ALT SERPL-CCNC: 34 U/L (ref 10–40)
AST SERPL-CCNC: 20 U/L (ref 15–37)
BILIRUB SERPL-MCNC: 0.5 MG/DL (ref 0–1)
BILIRUBIN DIRECT: <0.2 MG/DL (ref 0–0.3)
BILIRUBIN, INDIRECT: NORMAL MG/DL (ref 0–1)
CHOLESTEROL, TOTAL: 156 MG/DL (ref 0–199)
HDLC SERPL-MCNC: 40 MG/DL (ref 40–60)
LDL CHOLESTEROL CALCULATED: 85 MG/DL
TOTAL PROTEIN: 7.9 G/DL (ref 6.4–8.2)
TRIGL SERPL-MCNC: 153 MG/DL (ref 0–150)
VLDLC SERPL CALC-MCNC: 31 MG/DL

## 2023-03-08 PROCEDURE — 3078F DIAST BP <80 MM HG: CPT | Performed by: FAMILY MEDICINE

## 2023-03-08 PROCEDURE — 3074F SYST BP LT 130 MM HG: CPT | Performed by: FAMILY MEDICINE

## 2023-03-08 PROCEDURE — G8427 DOCREV CUR MEDS BY ELIG CLIN: HCPCS | Performed by: FAMILY MEDICINE

## 2023-03-08 PROCEDURE — 99214 OFFICE O/P EST MOD 30 MIN: CPT | Performed by: FAMILY MEDICINE

## 2023-03-08 PROCEDURE — 3044F HG A1C LEVEL LT 7.0%: CPT | Performed by: FAMILY MEDICINE

## 2023-03-08 PROCEDURE — G8419 CALC BMI OUT NRM PARAM NOF/U: HCPCS | Performed by: FAMILY MEDICINE

## 2023-03-08 PROCEDURE — 2022F DILAT RTA XM EVC RTNOPTHY: CPT | Performed by: FAMILY MEDICINE

## 2023-03-08 PROCEDURE — 3017F COLORECTAL CA SCREEN DOC REV: CPT | Performed by: FAMILY MEDICINE

## 2023-03-08 PROCEDURE — 1036F TOBACCO NON-USER: CPT | Performed by: FAMILY MEDICINE

## 2023-03-08 PROCEDURE — G8484 FLU IMMUNIZE NO ADMIN: HCPCS | Performed by: FAMILY MEDICINE

## 2023-03-08 RX ORDER — TRAZODONE HYDROCHLORIDE 50 MG/1
50 TABLET ORAL NIGHTLY PRN
Qty: 30 TABLET | Refills: 0 | Status: SHIPPED | OUTPATIENT
Start: 2023-03-08

## 2023-03-08 RX ORDER — LISINOPRIL 40 MG/1
40 TABLET ORAL DAILY
Qty: 30 TABLET | Refills: 2 | Status: SHIPPED | OUTPATIENT
Start: 2023-03-08

## 2023-03-08 ASSESSMENT — PATIENT HEALTH QUESTIONNAIRE - PHQ9
2. FEELING DOWN, DEPRESSED OR HOPELESS: 0
1. LITTLE INTEREST OR PLEASURE IN DOING THINGS: 0
SUM OF ALL RESPONSES TO PHQ QUESTIONS 1-9: 0
SUM OF ALL RESPONSES TO PHQ QUESTIONS 1-9: 0
SUM OF ALL RESPONSES TO PHQ9 QUESTIONS 1 & 2: 0
SUM OF ALL RESPONSES TO PHQ QUESTIONS 1-9: 0
SUM OF ALL RESPONSES TO PHQ QUESTIONS 1-9: 0

## 2023-03-08 NOTE — PROGRESS NOTES
SUBJECTIVE:  Patient ID: Celina Strickland is a 60 y.o. male. Chief Complaint:  Chief Complaint   Patient presents with    Diabetes    Medication Refill       HPI  60year old Male  1# Sleep disorder Rx Trazodone seen Sleep center 1/31/2023 He will get CPAP f/u visit is pending  Dx NIDDM He check glucose compliant with Rx  Dx Hypertension states compliant with Rx wife takes care of him  Dx Lipid disorder Rx started post hospital & Cardiology care    Past Medical History:   Diagnosis Date    Allergic rhinitis 10/11/2012    Cerebrovascular accident (CVA) due to occlusion of right cerebellar artery ([address] 07/25/2022    [facility]  /[facility]    HTN (hypertension) 10/11/2012    Hyperlipidemia     Sleep apnea     Stroke (cerebrum) ([address] 07/25/2022    Type 2 diabetes mellitus ([address] 08/10/2022     Past Surgical History:   Procedure Laterality Date    CARDIOVASCULAR STRESS TEST  Oct 2012    JHK, negative GXT    COLONOSCOPY N/A 2/3/2023    COLONOSCOPY performed by Priya Medina MD at [facility] ENDOSCOPY     No Known Allergies    Family History   Problem Relation Age of Onset    Cancer Mother         breast ca.   @age 68    Diabetes Father         @age 76     Social History     Social History Narrative    He was  from wife    Back together post his hospital stay for stroke    Wife takes good care of him    SS  since Dec 2022    Covered under insurance of his wife     Dip nicotine 2 can/day Alcohol None       Patient Active Problem List   Diagnosis    HTN (hypertension)    Allergic rhinitis    Chronic back pain    Cerebrovascular accident (CVA) due to occlusion of right cerebellar artery (HCC)    Type 2 diabetes mellitus ([address]    Chronic anticoagulation     Current Outpatient Medications   Medication Sig Dispense Refill    vitamin E 400 UNIT capsule Take 1 capsule by mouth 2 times daily 30 capsule 3    glipiZIDE (GLUCOTROL) 5 MG tablet TAKE 1 TABLET BY MOUTH TWICE DAILY BEFORE MEAL(S) 60 tablet 2    clopidogrel (PLAVIX) 75 MG tablet Take 1 tablet by mouth once daily 30 tablet 5    traZODone (DESYREL) 100 MG tablet Take 1 tablet by mouth nightly 30 tablet 2    NIFEdipine (PROCARDIA XL) 30 MG extended release tablet Take 1 tablet by mouth once daily 30 tablet 5    atorvastatin (LIPITOR) 80 MG tablet Take 1 tablet by mouth once daily 30 tablet 5    SITagliptin (JANUVIA) 100 MG tablet Take 1 tablet by mouth daily 90 tablet 1    ipratropium (ATROVENT) 0.03 % nasal spray 2 sprays by Each Nostril route 3 times daily as needed for Rhinitis 30 mL 3    lisinopril (PRINIVIL;ZESTRIL) 40 MG tablet Take 1 tablet by mouth daily 30 tablet 2    NONFORMULARY TOTAL BEETS (blood pressure)      melatonin 3 MG TABS tablet Take 3 mg by mouth daily 2 tablet nightly 6mgh      aspirin EC 81 MG EC tablet Take 1 tablet by mouth daily. 30 tablet 3    tiZANidine (ZANAFLEX) 4 MG tablet Take 1 tablet by mouth 3 times daily as needed (Muscle tightness/spasm) (Patient not taking: Reported on 3/8/2023) 30 tablet 1    nicotine (NICODERM CQ) 21 MG/24HR Place 1 patch onto the skin every 24 hours (Patient not taking: Reported on 3/8/2023)       No current facility-administered medications for this visit.      Lab Results   Component Value Date    WBC 6.4 08/10/2022    HGB 13.6 08/10/2022    HCT 40.6 08/10/2022    MCV 90.4 08/10/2022     08/10/2022     No results found for: CHOL  No results found for: TRIG  No results found for: HDL  No results found for: LDLCHOLESTEROL, LDLCALC  No results found for: LABVLDL, VLDL  No results found for: [facility]    Chemistry        Component Value Date/Time     12/07/2022 1435    K 5.1 12/07/2022 1435     12/07/2022 1435    CO2 26 12/07/2022 1435    BUN 20 12/07/2022 1435    CREATININE 1.2 12/07/2022 1435        Component Value Date/Time    CALCIUM 10.3 12/07/2022 1435    ALKPHOS 78 12/07/2022 1435    AST 18 12/07/2022 1435    ALT 35 12/07/2022 1435    BILITOT 0.3 12/07/2022 1435        No results found for: TSH, TSHREFLEX, TSHFT4, Dewey Quigley, [facility]  Lab Results   Component Value Date    PSA 0.57 08/10/2022     Hemoglobin A1C   Date Value Ref Range Status   03/08/2023 6.9 See comment % Final     Comment:     Comment:  Diagnosis of Diabetes: > or = 6.5%  Increased risk of diabetes (Prediabetes): 5.7-6.4%  Glycemic Control: Nonpregnant Adults: <7.0%                    Pregnant: <6.0%             Review of Systems   Constitutional:  Negative for chills, diaphoresis and fever. HENT: Negative. Negative for congestion and sinus pressure. Eyes:         Due eye exam   Respiratory:  Positive for apnea. In process for CPAP   Cardiovascular:  Negative for chest pain, palpitations and leg swelling. Gastrointestinal:  Negative for abdominal pain, diarrhea, nausea and vomiting. Endocrine:        DM   Genitourinary:  Negative for dysuria and flank pain. Musculoskeletal:  Negative for back pain, gait problem and neck pain. Skin:  Negative for rash. Allergic/Immunologic: Negative. Negative for environmental allergies and immunocompromised state. Neurological:  Negative for tremors, light-headedness and headaches. Hx Stroke   Psychiatric/Behavioral:  Positive for sleep disturbance. Negative for behavioral problems and self-injury. The patient is not nervous/anxious and is not hyperactive. OBJECTIVE:  BP (!) 152/80 (Site: Left Upper Arm, Position: Sitting, Cuff Size: Large Adult)   Pulse 70   Temp 98.5 °F (36.9 °C) (Oral)   Resp 20   Ht 5' 11\" (1.803 m)   Wt 210 lb 12.8 oz (95.6 kg)   SpO2 97%   BMI 29.40 kg/m²   Physical Exam  Constitutional:       Comments: Aware of BP reading   HENT:      Head: Normocephalic. Right Ear: Tympanic membrane normal.      Left Ear: Tympanic membrane normal.   Eyes:      Extraocular Movements: Extraocular movements intact. Pupils: Pupils are equal, round, and reactive to light. Cardiovascular:      Rate and Rhythm: Normal rate and regular rhythm. Pulses: Normal pulses. Heart sounds: Normal heart sounds. Pulmonary:      Breath sounds: No wheezing or rhonchi. Musculoskeletal:      Cervical back: Normal range of motion and neck supple. Neurological:      Mental Status: He is alert. ASSESSMENT/PLAN:      Diagnosis Orders   1. Type 2 diabetes mellitus with other specified complication, without long-term current use of insulin (HCC)  Hemoglobin A1C    Hepatic Function Panel    Lipid Panel      2. Chronic anticoagulation        3. Sleep disorder  traZODone (DESYREL) 50 MG tablet      4. Primary hypertension  lisinopril (PRINIVIL;ZESTRIL) 40 MG tablet      5. History of arterial ischemic stroke        6.  Stage 3a chronic kidney disease (HCC)              Dx Sleep Apnea followed by Dr Darek Orellana  Hx CKD  Nephrology care Dr Radha Temple 2/7/2023  Hx Colonoscopy Dr Ken Clinton 2/3/2023  Dx left shoulder disorder seen by Dr Dominique Lawson last visit 2/15/2023  Dx Hypertension aware of BP reading  BP reading today high encourage close follow up & monitor +Compliance  Dx Sialorrhea seen by ENT Dr Janak Eddy 1/9/2023  Hx Stroke  & left fascial droop followed by [facility]  Dr Layton Catching  visit 8/22/2022 +Carotid Angiogram done +CT Neck & MRI Brain & Carotid duplex  Approximate 60-70 % stenosis proximal Rt Carotid Rx ASA + Rx Statin + Started Rx Plavix  Close FU 3 month

## 2023-03-09 LAB
ESTIMATED AVERAGE GLUCOSE: 151.3 MG/DL
HBA1C MFR BLD: 6.9 %

## 2023-03-13 ASSESSMENT — ENCOUNTER SYMPTOMS
BACK PAIN: 0
ABDOMINAL PAIN: 0
DIARRHEA: 0
SINUS PRESSURE: 0
ALLERGIC/IMMUNOLOGIC NEGATIVE: 1
VOMITING: 0
NAUSEA: 0
APNEA: 1

## 2023-03-14 ENCOUNTER — HOSPITAL ENCOUNTER (OUTPATIENT)
Dept: PHYSICAL THERAPY | Age: 63
Setting detail: THERAPIES SERIES
Discharge: HOME | End: 2023-03-14
Payer: MEDICAID

## 2023-03-14 PROCEDURE — 97110 THERAPEUTIC EXERCISES: CPT | Performed by: PHYSICAL THERAPIST

## 2023-03-14 PROCEDURE — 97140 MANUAL THERAPY 1/> REGIONS: CPT | Performed by: PHYSICAL THERAPIST

## 2023-03-14 NOTE — PLAN OF CARE
The [address] [place] [address] [facility], [address] [place] [address], [address] [place] [address]  Phone: [phone]   Fax:     [phone]                                                   Physical Therapy Re-Certification Plan of Care    Dear Dr. Severo Minium,     We had the pleasure of treating the following patient for physical therapy services at [address] [place] [address]. A summary of our findings can be found in the updated assessment below. This includes our plan of care. If you have any questions or concerns regarding these findings, please do not hesitate to contact me at the office phone number checked above. Thank you for the referral.     Physician Signature:________________________________Date:__________________  By signing above (or electronic signature), therapists plan is approved by physician      Overall Response to Treatment:   []Patient is responding well to treatment and improvement is noted with regards  to goals   []Patient should continue to improve in reasonable time if they continue HEP   []Patient has plateaued and is no longer responding to skilled PT intervention    []Patient is getting worse and would benefit from return to referring MD   []Patient unable to adhere to initial POC   [x]Other: Focus on gaining shoulder ROM following cortisone injection from Dr. Etienne Palmer. Advance strengthening when appropriate.      Date range of previous POC Visits: -3/14    Total Visits: 13          Physical Therapy Daily Treatment Note  Date:  3/14/2023    Patient Name:  Jaskaran Diop    :  1960  MRN: [id number]  Restrictions/Precautions:    Medical/Treatment Diagnosis Information:  Diagnosis: M54.2 (ICD-10-CM) - Neck pain  Treatment Diagnosis: I48.5  Insurance/Certification information:  PT Insurance Information: [facility]  Physician Information:  Dr. Severo Minium   Has the plan of care been signed (Y/N):        [x]  Yes  [] No     Date of Patient follow up with Physician:       Is this a Progress Report:     [x]  Yes  []  No        If Yes:  Date Range for reporting period:  Beginning 1/25  Ending 3/14    Progress report will be due (10 Rx or 30 days whichever is less): 4/40      Recertification will be due (POC Duration  / 90 days whichever is less): 4/14        Visit # Insurance Allowable Auth Required   13  3/14   16 visits 12/18-1/27    16 visits 1/31-3/24 [x]  Yes []  No        Functional Scale: FOTO 53    Date assessed:  1/25     Latex Allergy:  [x]NO      []YES  Preferred Language for Healthcare:   [x]English       []other:      Pain level:  0/10 (3/14)     SUBJECTIVE:  Got the injection in his L shoulder which really helped with pain. His shoulder still feels tight.   3/14    OBJECTIVE:   Observation:   Test measurements:       ROM 1/25 AROM Comments   Flexion 55     Extension 30     Side bend R 10  \"tight\"   Side bend L 10  \"tight\"   Rotation R Mod limited  \"Crunching\"   Rotation L Min limited  \"Crunching\"    Shoulder AROM clearing R WFL   PROM of L shoulder - L flexion 150, abd 160, IR 40 @ 90, ER 60 deg @ 90 deg abd  3/14                 Strength 1/25 L R Comments   Neck flexion (C1-2)         Neck side bend (C3)         Shoulder elevation (C4) NT 5     Shoulder abduction (C5) NT 5     Elbow flexion and /or wrist extension  (C6) 4- 5     Elbow extension and/or wrist flexion  (C7) 4- 5     Thumb extension and/or ulnar deviation ((C8)         Abduction and /or adduction of hand intrinsics (T1)         IR/ER  4-/3+ 5            RESTRICTIONS/PRECAUTIONS:     Exercises/Interventions:   Exercise/Equipment Resistance/Repetitions Other comments   Stretching/PROM     ER seated with cane  10x10\"  Added 2/20   Chin tuck Supine 10x10\"    UT side bend stretch     Levater scap stretch     Sleeper  10x10\"  Added 1/25   3 finger rotation  20x   Added 12/22   Pulleys  10x10\" flexion, scaption   Added 12/12   pendulums 30x CW, CCW    Table slide 10x10\" flex, scap, ER Added ER 2/28   Hands on hips 10x10\"  Added 2/28   Supine cross body stretch 10x10\" Added 2/28        Isometrics     Retraction 3x10    Shrugs w/ bkwd roll 3x10    Cervical Flexion      Cervical Extension     Cervical sidebending          PRE's          Cable Column/Theraband     TRIC     Lats     Shrugs     Flex     BIC     PNF     No money 3x10         Manual intervention PROM of L shoulder in all directions with OP Added 2/20       Therapeutic Exercise and NMR EXR  [x] (08384) Provided verbal/tactile cueing for activities related to strengthening, flexibility, endurance, ROM  for improvements in cervical, postural, scapular, scapulothoracic and UE control with self care, reaching, carrying, lifting, house/yardwork, driving/computer work.    [] (71298) Provided verbal/tactile cueing for activities related to improving balance, coordination, kinesthetic sense, posture, motor skill, proprioception  to assist with cervical, scapular, scapulothoracic and UE control with self care, reaching, carrying, lifting, house/yardwork, driving/computer work. Therapeutic Activities:    [x] (36477 or 36932) Provided verbal/tactile cueing for activities related to improving balance, coordination, kinesthetic sense, posture, motor skill, proprioception and motor activation to allow for proper function of cervical, scapular, scapulothoracic and UE control with self care, carrying, lifting, driving/computer work.      Home Exercise Program:    [x] (76559) Reviewed/Progressed HEP activities related to strengthening, flexibility, endurance, ROM of cervical, scapular, scapulothoracic and UE control with self care, reaching, carrying, lifting, house/yardwork, driving/computer work  [] (25697) Reviewed/Progressed HEP activities related to improving balance, coordination, kinesthetic sense, posture, motor skill, proprioception of cervical, scapular, scapulothoracic and UE control with self care, reaching, carrying, lifting, house/yardwork, driving/computer work      Manual Treatments:  PROM / STM / Oscillations-Mobs:  G-I, II, III, IV (PA's, Inf., Post.)  [x] (61225) Provided manual therapy to mobilize soft tissue/joints of cervical/CT, scapular GHJ and UE for the purpose of decreasing headache, modulating pain, promoting relaxation,  increasing ROM, reducing/eliminating soft tissue swelling/inflammation/restriction, improving soft tissue extensibility and allowing for proper ROM for normal function with self care, reaching, carrying, lifting, house/yardwork, driving/computer work    Modalities:  ice 10'     Charges:  Timed Code Treatment Minutes: 40   Total Treatment Minutes: 10:00-10:45       [] EVAL (LOW) 59797 (typically 20 minutes face-to-face)  [] EVAL (MOD) 37753 (typically 30 minutes face-to-face)  [] EVAL (HIGH) 35226 (typically 45 minutes face-to-face)  [] RE-EVAL     [x] DY(19066) x 2  [] IONTO  [] NMR (26611) x      [] VASO  [x] Manual (93986) x 1      [] Other:  [] TA x      [] Mech Traction (26891)  [] ES(attended) (64926)      [] ES (un) (11616):     HEP instruction:   Access Code: [email or web address]  URL: [email or web address]  Date: 12/08/2022  Prepared by: Salvador Michelle      GOALS:   Patient stated goal: Get rid of pain and gain normal function    [x] Progressing: [] Met: [] Not Met: [] Adjusted    Therapist goals for Patient:   Short Term Goals: To be achieved in: 2 weeks  1. Independent in HEP and progression per patient tolerance, in order to prevent re-injury. [x] Progressing: Pt still requires verbal and tactile cues [] Met: [] Not Met: [] Adjusted   2. Patient will have a decrease in pain to facilitate improvement in movement, function, and ADLs as indicated by Functional Deficits. [] Progressing: [x] Met: [] Not Met: [] Adjusted    Long Term Goals: To be achieved in: 8-10 weeks  1. Improve FOTO score to at least 66 to assist with reaching prior level of function.    [] Progressing: [] Met: [x] Not Met: [] Adjusted  2. Patient will demonstrate increased AROM to WNL of cervical/thoracic spine and L UE ROM to allow for proper joint functioning as indicated by patients Functional Deficits. [x] Progressing: [] Met: [] Not Met: [] Adjusted  Patient will demonstrate an increase in postural awareness and control and activation of  Deep cervical stabilizers to allow for proper functional mobility as indicated by patients Functional Deficits. [x] Progressing: [] Met: [] Not Met: [] Adjusted  4. Patient will return to daily functional activities without increased symptoms or restriction. [x] Progressing: [] Met: [] Not Met: [] Adjusted  5. Patient will be able to hunt without increased symptoms or restriction. [x] Progressing: [] Met: [] Not Met: [] Adjusted    Overall Progression Towards Functional goals/ Treatment Progress Update:  [x] Patient is progressing as expected towards functional goals listed. [] Progression is slowed due to complexities/Impairments listed. [] Progression has been slowed due to co-morbidities. [] Plan just implemented, too soon to assess goals progression <30days   [] Goals require adjustment due to lack of progress  [] Patient is not progressing as expected and requires additional follow up with physician  [] Other:     Prognosis for POC: [x] Good [] Fair  [] Poor      Patient requires continued skilled intervention: [x] Yes  [] No    Treatment/Activity Tolerance:  [x] Patient able to complete treatment  [] Patient limited by fatigue  [] Patient limited by pain     [] Patient limited by other medical complications  [x] Other: good gains noted in ROM after cortisone injection. Continue to focus on gaining mobility and gradually introduce strengthening as able. 3/14                  Patient Education:  12/8 Patient education on PT and plan of care including diagnosis, prognosis, treatment goals and options.  Patient agrees with discussed POC and treatment and is aware of rehab process. Pt was also educated on clinic layout and use of modalities. 12/29 Discussed importance of doing HEP in order to see improvements         PLAN: 2x per week for 4 weeks. 3/14  [x] Continue per plan of care [] Alter current plan (see comments above)  [] Plan of care initiated [] Hold pending MD visit [] Discharge      Electronically signed by:  Sunita Hazel, PT    Note: If patient does not return for scheduled/ recommended follow up visits, this note will serve as a discharge from care along with most recent update on progress.

## 2023-03-16 ENCOUNTER — HOSPITAL ENCOUNTER (OUTPATIENT)
Dept: PHYSICAL THERAPY | Age: 63
Setting detail: THERAPIES SERIES
Discharge: HOME | End: 2023-03-16
Payer: MEDICAID

## 2023-03-16 PROCEDURE — 97140 MANUAL THERAPY 1/> REGIONS: CPT | Performed by: PHYSICAL THERAPIST

## 2023-03-16 PROCEDURE — 97110 THERAPEUTIC EXERCISES: CPT | Performed by: PHYSICAL THERAPIST

## 2023-03-16 NOTE — PLAN OF CARE
[facility],  [address]  Phone: [phone]   Fax:     [phone]                                                  Physical Therapy Daily Treatment Note  Date:  3/16/2023    Patient Name:  Michelle Fitzpatrick    :  1960  MRN: [id number]  Restrictions/Precautions:    Medical/Treatment Diagnosis Information:  Diagnosis: M54.2 (ICD-10-CM) - Neck pain  Treatment Diagnosis: M10.4  Insurance/Certification information:  PT Insurance Information: [address]  Physician Information:  Dr. Darrin Masterson   Has the plan of care been signed (Y/N):        [x]  Yes  []  No     Date of Patient follow up with Physician:       Is this a Progress Report:     [x]  Yes  []  No        If Yes:  Date Range for reporting period:  Beginning   Ending 3/14    Progress report will be due (10 Rx or 30 days whichever is less):       Recertification will be due (POC Duration  / 90 days whichever is less):         Visit # Insurance Allowable Auth Required   14  3/16   16 visits -    16 visits -3/24 [x]  Yes []  No        Functional Scale: FOTO 53    Date assessed:       Latex Allergy:  [x]NO      []YES  Preferred Language for Healthcare:   [x]English       []other:      Pain level:  0/10 (3/14)     SUBJECTIVE:  Doing well today.  3/16    OBJECTIVE:   Observation:   Test measurements:       ROM  AROM Comments   Flexion 55     Extension 30     Side bend R 10  \"tight\"   Side bend L 10  \"tight\"   Rotation R Mod limited  \"Crunching\"   Rotation L Min limited  \"Crunching\"    Shoulder AROM clearing R WFL   PROM of L shoulder - L flexion 150, abd 160, IR 40 @ 90, ER 60 deg @ 90 deg abd  3/14                 Strength  L R Comments   Neck flexion (C1-2)         Neck side bend (C3)         Shoulder elevation (C4) NT 5     Shoulder abduction (C5) NT 5     Elbow flexion and /or wrist extension  (C6) 4- 5 Elbow extension and/or wrist flexion  (C7) 4- 5     Thumb extension and/or ulnar deviation ((C8)         Abduction and /or adduction of hand intrinsics (T1)         IR/ER  4-/3+ 5            RESTRICTIONS/PRECAUTIONS:     Exercises/Interventions:   Exercise/Equipment Resistance/Repetitions Other comments   Stretching/PROM     ER seated with cane  10x10\"  Added 2/20   Chin tuck Supine 10x10\"    UT side bend stretch     Levater scap stretch     Sleeper  10x10\"  Added 1/25   3 finger rotation  20x   Added 12/22   Pulleys  10x10\" flexion, scaption   Added 12/12   pendulums 30x CW, CCW    Table slide 10x10\" flex, scap, ER Added ER 2/28   Hands on hips 10x10\"  Added 2/28   Supine cross body stretch 10x10\" Added 2/28        Isometrics     Retraction 3x10    Shrugs w/ bkwd roll 3x10    Cervical Flexion      Cervical Extension     Cervical sidebending          PRE's          Cable Column/Theraband     TRIC     Lats     Shrugs     Flex     BIC     PNF     No money 3x10         Manual intervention PROM of L shoulder in all directions with OP Added 2/20       Therapeutic Exercise and NMR EXR  [x] (32207) Provided verbal/tactile cueing for activities related to strengthening, flexibility, endurance, ROM  for improvements in cervical, postural, scapular, scapulothoracic and UE control with self care, reaching, carrying, lifting, house/yardwork, driving/computer work.    [] (45150) Provided verbal/tactile cueing for activities related to improving balance, coordination, kinesthetic sense, posture, motor skill, proprioception  to assist with cervical, scapular, scapulothoracic and UE control with self care, reaching, carrying, lifting, house/yardwork, driving/computer work.     Therapeutic Activities:    [x] (67373 or 10843) Provided verbal/tactile cueing for activities related to improving balance, coordination, kinesthetic sense, posture, motor skill, proprioception and motor activation to allow for proper function of cervical, scapular, scapulothoracic and UE control with self care, carrying, lifting, driving/computer work. Home Exercise Program:    [x] (46317) Reviewed/Progressed HEP activities related to strengthening, flexibility, endurance, ROM of cervical, scapular, scapulothoracic and UE control with self care, reaching, carrying, lifting, house/yardwork, driving/computer work  [] (70060) Reviewed/Progressed HEP activities related to improving balance, coordination, kinesthetic sense, posture, motor skill, proprioception of cervical, scapular, scapulothoracic and UE control with self care, reaching, carrying, lifting, house/yardwork, driving/computer work      Manual Treatments:  PROM / STM / Oscillations-Mobs:  G-I, II, III, IV (PA's, Inf., Post.)  [x] (78359) Provided manual therapy to mobilize soft tissue/joints of cervical/CT, scapular GHJ and UE for the purpose of decreasing headache, modulating pain, promoting relaxation,  increasing ROM, reducing/eliminating soft tissue swelling/inflammation/restriction, improving soft tissue extensibility and allowing for proper ROM for normal function with self care, reaching, carrying, lifting, house/yardwork, driving/computer work    Modalities:    Charges:  Timed Code Treatment Minutes: 40   Total Treatment Minutes: 9:50-10:40       [] EVAL (LOW) 70802 (typically 20 minutes face-to-face)  [] EVAL (MOD) 40212 (typically 30 minutes face-to-face)  [] EVAL (HIGH) 65779 (typically 45 minutes face-to-face)  [] RE-EVAL     [x] UQ(57562) x 2  [] IONTO  [] NMR (57062) x      [] VASO  [x] Manual (53624) x 1      [] Other:  [] TA x      [] Mech Traction (11505)  [] ES(attended) (61522)      [] ES (un) (04111):     HEP instruction:   Access Code: [email or web address]  URL: [email or web address]  Date: 12/08/2022  Prepared by: Hamlet Silva      GOALS:   Patient stated goal: Get rid of pain and gain normal function    [x] Progressing: [] Met: [] Not Met: [] Adjusted    Therapist goals for Patient: Short Term Goals: To be achieved in: 2 weeks  1. Independent in HEP and progression per patient tolerance, in order to prevent re-injury. [x] Progressing: Pt still requires verbal and tactile cues [] Met: [] Not Met: [] Adjusted   2. Patient will have a decrease in pain to facilitate improvement in movement, function, and ADLs as indicated by Functional Deficits. [] Progressing: [x] Met: [] Not Met: [] Adjusted    Long Term Goals: To be achieved in: 8-10 weeks  1. Improve FOTO score to at least 66 to assist with reaching prior level of function. [] Progressing: [] Met: [x] Not Met: [] Adjusted  2. Patient will demonstrate increased AROM to WNL of cervical/thoracic spine and L UE ROM to allow for proper joint functioning as indicated by patients Functional Deficits. [x] Progressing: [] Met: [] Not Met: [] Adjusted  Patient will demonstrate an increase in postural awareness and control and activation of  Deep cervical stabilizers to allow for proper functional mobility as indicated by patients Functional Deficits. [x] Progressing: [] Met: [] Not Met: [] Adjusted  4. Patient will return to daily functional activities without increased symptoms or restriction. [x] Progressing: [] Met: [] Not Met: [] Adjusted  5. Patient will be able to hunt without increased symptoms or restriction. [x] Progressing: [] Met: [] Not Met: [] Adjusted    Overall Progression Towards Functional goals/ Treatment Progress Update:  [x] Patient is progressing as expected towards functional goals listed. [] Progression is slowed due to complexities/Impairments listed. [] Progression has been slowed due to co-morbidities.   [] Plan just implemented, too soon to assess goals progression <30days   [] Goals require adjustment due to lack of progress  [] Patient is not progressing as expected and requires additional follow up with physician  [] Other:     Prognosis for POC: [x] Good [] Fair  [] Poor      Patient requires continued skilled intervention: [x] Yes  [] No    Treatment/Activity Tolerance:  [x] Patient able to complete treatment  [] Patient limited by fatigue  [] Patient limited by pain     [] Patient limited by other medical complications  [x] Other: continue to focus on ROM - if he is doing well gradually introduce strengthening next week. Patient Education:  12/8 Patient education on PT and plan of care including diagnosis, prognosis, treatment goals and options. Patient agrees with discussed POC and treatment and is aware of rehab process. Pt was also educated on clinic layout and use of modalities. 12/29 Discussed importance of doing HEP in order to see improvements         PLAN: 2x per week for 4 weeks. 3/14  [x] Continue per plan of care [] Alter current plan (see comments above)  [] Plan of care initiated [] Hold pending MD visit [] Discharge      Electronically signed by:  Iliana Ash, PT    Note: If patient does not return for scheduled/ recommended follow up visits, this note will serve as a discharge from care along with most recent update on progress.

## 2023-03-20 ENCOUNTER — HOSPITAL ENCOUNTER (OUTPATIENT)
Dept: PHYSICAL THERAPY | Age: 63
Setting detail: THERAPIES SERIES
Discharge: HOME | End: 2023-03-20
Payer: MEDICAID

## 2023-03-20 PROCEDURE — 97112 NEUROMUSCULAR REEDUCATION: CPT | Performed by: PHYSICAL THERAPIST

## 2023-03-20 PROCEDURE — 97140 MANUAL THERAPY 1/> REGIONS: CPT | Performed by: PHYSICAL THERAPIST

## 2023-03-20 PROCEDURE — 97110 THERAPEUTIC EXERCISES: CPT | Performed by: PHYSICAL THERAPIST

## 2023-03-20 NOTE — FLOWSHEET NOTE
[address] [facility], [facility] [address], [address] [place] [address]  Phone: [phone]   Fax:     [phone]                                                  Physical Therapy Daily Treatment Note  Date:  3/20/2023    Patient Name:  Gaston Calvo    :  1960  MRN: [id number]  Restrictions/Precautions:    Medical/Treatment Diagnosis Information:  Diagnosis: M54.2 (ICD-10-CM) - Neck pain  Treatment Diagnosis: F26.3  Insurance/Certification information:  PT Insurance Information: [address]  Physician Information:  Dr. Cristina Freedman   Has the plan of care been signed (Y/N):        [x]  Yes  []  No     Date of Patient follow up with Physician:       Is this a Progress Report:     [x]  Yes  []  No        If Yes:  Date Range for reporting period:  Beginning   Ending 3/14    Progress report will be due (10 Rx or 30 days whichever is less):       Recertification will be due (POC Duration  / 90 days whichever is less):         Visit # Insurance Allowable Auth Required   15  3/20   16 visits -    16 visits -3/24 [x]  Yes []  No        Functional Scale: FOTO 53    Date assessed:       Latex Allergy:  [x]NO      []YES  Preferred Language for Healthcare:   [x]English       []other:      Pain level:  0/10 (3/14)     SUBJECTIVE:  Sore in the biceps region 3/20    OBJECTIVE:   Observation:   Test measurements:       ROM  AROM Comments   Flexion 55     Extension 30     Side bend R 10  \"tight\"   Side bend L 10  \"tight\"   Rotation R Mod limited  \"Crunching\"   Rotation L Min limited  \"Crunching\"    Shoulder AROM clearing R WFL   PROM of L shoulder - L flexion 150, abd 160, IR 40 @ 90, ER 60 deg @ 90 deg abd  3/14                 Strength  L R Comments   Neck flexion (C1-2)         Neck side bend (C3)         Shoulder elevation (C4) NT 5     Shoulder abduction (C5) NT 5     Elbow flexion and /or wrist extension  (C6) 4-

## 2023-03-23 ENCOUNTER — HOSPITAL ENCOUNTER (OUTPATIENT)
Dept: PHYSICAL THERAPY | Age: 63
Setting detail: THERAPIES SERIES
Discharge: HOME | End: 2023-03-23
Payer: MEDICAID

## 2023-03-23 PROCEDURE — 97110 THERAPEUTIC EXERCISES: CPT | Performed by: PHYSICAL THERAPIST

## 2023-03-23 PROCEDURE — 97140 MANUAL THERAPY 1/> REGIONS: CPT | Performed by: PHYSICAL THERAPIST

## 2023-03-23 PROCEDURE — 97112 NEUROMUSCULAR REEDUCATION: CPT | Performed by: PHYSICAL THERAPIST

## 2023-03-23 NOTE — FLOWSHEET NOTE
Activities:    [x] (86036 or 60417) Provided verbal/tactile cueing for activities related to improving balance, coordination, kinesthetic sense, posture, motor skill, proprioception and motor activation to allow for proper function of cervical, scapular, scapulothoracic and UE control with self care, carrying, lifting, driving/computer work.      Home Exercise Program:    [x] (76960) Reviewed/Progressed HEP activities related to strengthening, flexibility, endurance, ROM of cervical, scapular, scapulothoracic and UE control with self care, reaching, carrying, lifting, house/yardwork, driving/computer work  [] (92769) Reviewed/Progressed HEP activities related to improving balance, coordination, kinesthetic sense, posture, motor skill, proprioception of cervical, scapular, scapulothoracic and UE control with self care, reaching, carrying, lifting, house/yardwork, driving/computer work      Manual Treatments:  PROM / STM / Oscillations-Mobs:  G-I, II, III, IV (PA's, Inf., Post.)  [x] (47368) Provided manual therapy to mobilize soft tissue/joints of cervical/CT, scapular GHJ and UE for the purpose of decreasing headache, modulating pain, promoting relaxation,  increasing ROM, reducing/eliminating soft tissue swelling/inflammation/restriction, improving soft tissue extensibility and allowing for proper ROM for normal function with self care, reaching, carrying, lifting, house/yardwork, driving/computer work    Modalities:      Charges:  Timed Code Treatment Minutes: 45   Total Treatment Minutes: 9:58-10:54       [] EVAL (LOW) 83231 (typically 20 minutes face-to-face)  [] EVAL (MOD) 33165 (typically 30 minutes face-to-face)  [] EVAL (HIGH) 47066 (typically 45 minutes face-to-face)  [] RE-EVAL     [x] AM(35319) x 1  [] IONTO  [x] NMR (89739) x 1     [] VASO  [x] Manual (22895) x 1      [] Other:  [] TA x      [] Mech Traction (58307)  [] ES(attended) (14224)      [] ES (un) (39934):     HEP instruction:   Access Code:

## 2023-03-27 ENCOUNTER — APPOINTMENT (OUTPATIENT)
Dept: PHYSICAL THERAPY | Age: 63
End: 2023-03-27
Payer: MEDICAID

## 2023-03-30 ENCOUNTER — HOSPITAL ENCOUNTER (OUTPATIENT)
Dept: PHYSICAL THERAPY | Age: 63
Setting detail: THERAPIES SERIES
Discharge: HOME | End: 2023-03-30
Payer: MEDICAID

## 2023-03-30 NOTE — FLOWSHEET NOTE
Physical Therapy  Cancellation/No-show Note  Patient Name:  Itz Farley  :  1960   Date:  3/30/2023  Cancelled visits to date: 01  No-shows to date: 0    For today's appointment patient:  [x]  Cancelled  []  Rescheduled appointment  []  No-show     Reason given by patient:  []  Patient ill  []  Conflicting appointment  []  No transportation    []  Conflict with work  []  No reason given  [x]  Other:     Comments:  Awaiting auth from insurance    Electronically signed by:  Elisabeth Goltz, PT, DPT

## 2023-03-31 DIAGNOSIS — G47.9 SLEEP DISORDER: ICD-10-CM

## 2023-03-31 RX ORDER — TRAZODONE HYDROCHLORIDE 50 MG/1
50 TABLET ORAL NIGHTLY PRN
Qty: 30 TABLET | Refills: 2 | Status: SHIPPED | OUTPATIENT
Start: 2023-03-31

## 2023-04-14 ENCOUNTER — HOSPITAL ENCOUNTER (OUTPATIENT)
Dept: PHYSICAL THERAPY | Age: 63
Setting detail: THERAPIES SERIES
Discharge: HOME | End: 2023-04-14
Payer: MEDICAID

## 2023-04-17 ENCOUNTER — HOSPITAL ENCOUNTER (OUTPATIENT)
Dept: PHYSICAL THERAPY | Age: 63
Setting detail: THERAPIES SERIES
Discharge: HOME | End: 2023-04-17
Payer: MEDICAID

## 2023-04-17 PROCEDURE — 97140 MANUAL THERAPY 1/> REGIONS: CPT | Performed by: PHYSICAL THERAPIST

## 2023-04-17 PROCEDURE — 97110 THERAPEUTIC EXERCISES: CPT | Performed by: PHYSICAL THERAPIST

## 2023-04-17 PROCEDURE — 97112 NEUROMUSCULAR REEDUCATION: CPT | Performed by: PHYSICAL THERAPIST

## 2023-04-17 NOTE — FLOWSHEET NOTE
[address] [facility], [facility] [employer] [facility], [address] [place] [address]  Phone: [phone]   Fax:     [phone]                                                  Physical Therapy Daily Treatment Note  Date:  2023    Patient Name:  Ivan Salinas    :  1960  MRN: [id number]  Restrictions/Precautions:    Medical/Treatment Diagnosis Information:  Diagnosis: M54.2 (ICD-10-CM) - Neck pain  Treatment Diagnosis: E70.7  Insurance/Certification information:  PT Insurance Information: [facility]  Physician Information:  Dr. Gretchen Higgins   Has the plan of care been signed (Y/N):        [x]  Yes  []  No     Date of Patient follow up with Physician:       Is this a Progress Report:     [x]  Yes  []  No        If Yes:  Date Range for reporting period:  Beginning   Ending 3/14    Progress report will be due (10 Rx or 30 days whichever is less):       Recertification will be due (POC Duration  / 90 days whichever is less):         Visit # Insurance Allowable Auth Required   16  3/23    2   16 visits -      16 visits 3/24- [x]  Yes []  No        Functional Scale: FOTO 53 NPV   Date assessed:       Latex Allergy:  [x]NO      []YES  Preferred Language for Healthcare:   [x]English       []other:      Pain level:  0/10 (3/14)     SUBJECTIVE:  no issues     OBJECTIVE:   Observation:   Test measurements:       ROM  AROM Comments   Flexion 55     Extension 30     Side bend R 10  \"tight\"   Side bend L 10  \"tight\"   Rotation R Mod limited  \"Crunching\"   Rotation L Min limited  \"Crunching\"    Shoulder AROM clearing R [place]     3/14                Shoulder AROM/PROM  3/23 L R Comments   Flexion  150/160       Abduction   80       IR  L3/40 deg @ 90 deg abd      ER Top of head/  53 deg @ 90 deg                   Strength 3/23 L R Comments   Neck flexion (C1-2)         Neck side bend (C3)         Shoulder elevation (C4) 3+

## 2023-04-19 ENCOUNTER — HOSPITAL ENCOUNTER (OUTPATIENT)
Dept: PHYSICAL THERAPY | Age: 63
Setting detail: THERAPIES SERIES
Discharge: HOME | End: 2023-04-19
Payer: MEDICAID

## 2023-04-19 PROCEDURE — 97110 THERAPEUTIC EXERCISES: CPT | Performed by: PHYSICAL THERAPIST

## 2023-04-19 PROCEDURE — 97140 MANUAL THERAPY 1/> REGIONS: CPT | Performed by: PHYSICAL THERAPIST

## 2023-04-19 PROCEDURE — 97112 NEUROMUSCULAR REEDUCATION: CPT | Performed by: PHYSICAL THERAPIST

## 2023-04-19 NOTE — FLOWSHEET NOTE
[address] [facility],  [address]  Phone: [phone]   Fax:     [phone]                                                  Physical Therapy Daily Treatment Note  Date:  2023    Patient Name:  Carleen Dee    :  1960  MRN: [id number]  Restrictions/Precautions:    Medical/Treatment Diagnosis Information:  Diagnosis: M54.2 (ICD-10-CM) - Neck pain  Treatment Diagnosis: V14.0  Insurance/Certification information:  PT Insurance Information: [address]  Physician Information:  Dr. Kristin Kohler   Has the plan of care been signed (Y/N):        [x]  Yes  []  No     Date of Patient follow up with Physician:       Is this a Progress Report:     [x]  Yes  []  No        If Yes:  Date Range for reporting period:  Beginning   Ending 3/14    Progress report will be due (10 Rx or 30 days whichever is less):       Recertification will be due (POC Duration  / 90 days whichever is less):         Visit # Insurance Allowable Auth Required   16  3/23    3   16 visits -      16 visits 3/24- [x]  Yes []  No        Functional Scale: UEFS 64/80   Date assessed:       Latex Allergy:  [x]NO      []YES  Preferred Language for Healthcare:   [x]English       []other:      Pain level:  0/10 (3/14)     SUBJECTIVE:  Pt reports he was very sore after her last session.      OBJECTIVE:   Observation:   Test measurements:       ROM  AROM Comments   Flexion 55     Extension 30     Side bend R 10  \"tight\"   Side bend L 10  \"tight\"   Rotation R Mod limited  \"Crunching\"   Rotation L Min limited  \"Crunching\"    Shoulder AROM clearing R [place]     3/14                Shoulder AROM/PROM  3/23 L R Comments   Flexion  150/160       Abduction   80       IR  L3/40 deg @ 90 deg abd      ER Top of head/  53 deg @ 90 deg                   Strength 3/23 L R Comments   Neck flexion (C1-2)         Neck side bend

## 2023-04-20 ENCOUNTER — ANESTHESIA EVENT (OUTPATIENT)
Dept: ENDOSCOPY | Age: 63
End: 2023-04-20
Payer: MEDICAID

## 2023-04-21 ENCOUNTER — ANESTHESIA (OUTPATIENT)
Dept: ENDOSCOPY | Age: 63
End: 2023-04-21
Payer: MEDICAID

## 2023-04-21 ENCOUNTER — HOSPITAL ENCOUNTER (OUTPATIENT)
Age: 63
Setting detail: OUTPATIENT SURGERY
Discharge: HOME | End: 2023-04-21
Attending: INTERNAL MEDICINE | Admitting: INTERNAL MEDICINE
Payer: MEDICAID

## 2023-04-21 VITALS
BODY MASS INDEX: 28.44 KG/M2 | HEART RATE: 68 BPM | TEMPERATURE: 97.9 F | DIASTOLIC BLOOD PRESSURE: 83 MMHG | SYSTOLIC BLOOD PRESSURE: 133 MMHG | RESPIRATION RATE: 16 BRPM | OXYGEN SATURATION: 95 % | WEIGHT: 210 LBS | HEIGHT: 72 IN

## 2023-04-21 DIAGNOSIS — Z12.11 COLON CANCER SCREENING: ICD-10-CM

## 2023-04-21 PROCEDURE — 2500000003 HC RX 250 WO HCPCS: Performed by: NURSE ANESTHETIST, CERTIFIED REGISTERED

## 2023-04-21 PROCEDURE — 3609010600 HC COLONOSCOPY POLYPECTOMY SNARE/COLD BIOPSY: Performed by: INTERNAL MEDICINE

## 2023-04-21 PROCEDURE — 2709999900 HC NON-CHARGEABLE SUPPLY: Performed by: INTERNAL MEDICINE

## 2023-04-21 PROCEDURE — 88305 TISSUE EXAM BY PATHOLOGIST: CPT

## 2023-04-21 PROCEDURE — 3700000000 HC ANESTHESIA ATTENDED CARE: Performed by: INTERNAL MEDICINE

## 2023-04-21 PROCEDURE — 6360000002 HC RX W HCPCS: Performed by: NURSE ANESTHETIST, CERTIFIED REGISTERED

## 2023-04-21 PROCEDURE — 7100000010 HC PHASE II RECOVERY - FIRST 15 MIN: Performed by: INTERNAL MEDICINE

## 2023-04-21 PROCEDURE — 7100000011 HC PHASE II RECOVERY - ADDTL 15 MIN: Performed by: INTERNAL MEDICINE

## 2023-04-21 PROCEDURE — 3700000001 HC ADD 15 MINUTES (ANESTHESIA): Performed by: INTERNAL MEDICINE

## 2023-04-21 PROCEDURE — 2580000003 HC RX 258: Performed by: ANESTHESIOLOGY

## 2023-04-21 PROCEDURE — 3609010200 HC COLONOSCOPY ABLATION TUMOR POLYP/OTHER LES: Performed by: INTERNAL MEDICINE

## 2023-04-21 PROCEDURE — 2720000010 HC SURG SUPPLY STERILE: Performed by: INTERNAL MEDICINE

## 2023-04-21 RX ORDER — PROPOFOL 10 MG/ML
INJECTION, EMULSION INTRAVENOUS CONTINUOUS PRN
Status: DISCONTINUED | OUTPATIENT
Start: 2023-04-21 | End: 2023-04-21 | Stop reason: SDUPTHER

## 2023-04-21 RX ORDER — PROPOFOL 10 MG/ML
INJECTION, EMULSION INTRAVENOUS PRN
Status: DISCONTINUED | OUTPATIENT
Start: 2023-04-21 | End: 2023-04-21 | Stop reason: SDUPTHER

## 2023-04-21 RX ORDER — SODIUM CHLORIDE, SODIUM LACTATE, POTASSIUM CHLORIDE, CALCIUM CHLORIDE 600; 310; 30; 20 MG/100ML; MG/100ML; MG/100ML; MG/100ML
INJECTION, SOLUTION INTRAVENOUS CONTINUOUS
Status: DISCONTINUED | OUTPATIENT
Start: 2023-04-21 | End: 2023-04-21 | Stop reason: HOSPADM

## 2023-04-21 RX ORDER — LIDOCAINE HYDROCHLORIDE 20 MG/ML
INJECTION, SOLUTION EPIDURAL; INFILTRATION; INTRACAUDAL; PERINEURAL PRN
Status: DISCONTINUED | OUTPATIENT
Start: 2023-04-21 | End: 2023-04-21 | Stop reason: SDUPTHER

## 2023-04-21 RX ADMIN — PROPOFOL 120 MCG/KG/MIN: 10 INJECTION, EMULSION INTRAVENOUS at 08:32

## 2023-04-21 RX ADMIN — LIDOCAINE HYDROCHLORIDE 80 MG: 20 INJECTION, SOLUTION EPIDURAL; INFILTRATION; INTRACAUDAL; PERINEURAL at 08:30

## 2023-04-21 RX ADMIN — PROPOFOL 100 MG: 10 INJECTION, EMULSION INTRAVENOUS at 08:30

## 2023-04-21 RX ADMIN — SODIUM CHLORIDE, POTASSIUM CHLORIDE, SODIUM LACTATE AND CALCIUM CHLORIDE: 600; 310; 30; 20 INJECTION, SOLUTION INTRAVENOUS at 07:43

## 2023-04-21 ASSESSMENT — PAIN SCALES - GENERAL
PAINLEVEL_OUTOF10: 0

## 2023-04-21 ASSESSMENT — PAIN - FUNCTIONAL ASSESSMENT: PAIN_FUNCTIONAL_ASSESSMENT: 0-10

## 2023-04-21 NOTE — DISCHARGE INSTRUCTIONS
excellent care while you are here. You may receive a survey in the mail regarding your care. We would appreciate you taking a few minutes of your time to complete this survey.

## 2023-04-21 NOTE — PROCEDURES
[facility]  Colonoscopy Note    Patient: Celina Simon  : 1960  Acct#:     Procedure: Colonoscopy with polypectomy (cold snare), polypectomy (snare cautery), endoclip x  1     Date:  2023    Surgeon:  Markus Etienne MD    Referring Physician:  Nimco Mcmahon MD    Anesthesia: IV propofol, per anesthesia    EBL: <50 mL    Indications:  personal history of colon polyps     Procedure: An informed consent was obtained from the patient after explanation of indications, benefits, possible risks and complications of the procedure. The patient was then taken to the endoscopy suite, placed in the left lateral decubitus position, and the above IV anesthesia was administered. A digital rectal examination was performed and revealed negative without mass, lesions or tenderness. The Olympus PCFQ-H190 video colonoscope was placed in the patient's rectum under digital direction and advanced to the cecum. The cecum was identified by characteristic anatomy and ballottment. The prep was fair. Findings:  A 6 mm sessile polyp was found in the ascending colon was removed via cold snare polypectomy. A 14 mm pedunculated polyp was found in the sigmoid colon was removed via hot snare polypectomy. An Endo Clip was placed at the polypectomy site for bleeding prophylaxis because he is going to restart Plavix  Diverticulosis      The scope was then withdrawn into the rectum and retroflexed. The retroflexed view of the anal verge and rectum demonstrates small hemorrhoids. The scope was straightened, the colon was decompressed and the scope was withdrawn from the patient. The patient tolerated the procedure well and was taken to the PACU in good condition. Biopsies:  yes       Impression:   A 6 mm sessile polyp was found in the ascending colon was removed via cold snare polypectomy.   A 14 mm pedunculated polyp was found in the sigmoid colon was removed via hot

## 2023-04-21 NOTE — ANESTHESIA POSTPROCEDURE EVALUATION
Department of Anesthesiology  Postprocedure Note    Patient: Benoit Aldana  MRN: [id number]  YOB: 1960  Date of evaluation: 4/21/2023      Procedure Summary     Date: 04/21/23 Room / Location: [facility]    Anesthesia Start: 0825 Anesthesia Stop: Lana Acosta    Procedures:       COLONOSCOPY POLYPECTOMY SNARE/COLD BIOPSY      COLONOSCOPY POLYPECTOMY ABLATION Diagnosis:       Colon cancer screening      (Colon cancer screening [Z12.11])    Surgeons: Jessica Good MD Responsible Provider: Isabella Olmstead MD    Anesthesia Type: MAC ASA Status: 3          Anesthesia Type: No value filed.     Betty Phase I: Betty Score: 10    Betty Phase II: Betty Score: 10      Anesthesia Post Evaluation    Patient location during evaluation: PACU  Level of consciousness: awake  Complications: no  Multimodal analgesia pain management approach

## 2023-04-21 NOTE — ANESTHESIA PRE PROCEDURE
Department of Anesthesiology  Preprocedure Note       Name:  Jameson Ch   Age:  60 y.o.  :  1960                                          MRN:  [id number]         Date:  2023      Surgeon: Epi Emanuel):  Emery Lanza MD    Procedure: Procedure(s):  COLONOSCOPY    Medications prior to admission:   Prior to Admission medications    Medication Sig Start Date End Date Taking? Authorizing Provider   traZODone (DESYREL) 50 MG tablet TAKE 1 TABLET BY MOUTH NIGHTLY AS NEEDED FOR SLEEP 3/31/23   Florian Pride MD   lisinopril (PRINIVIL;ZESTRIL) 40 MG tablet Take 1 tablet by mouth daily 3/8/23   Florian Pride MD   vitamin E 400 UNIT capsule Take 1 capsule by mouth 2 times daily 3/1/23   Florian Pride MD   glipiZIDE (GLUCOTROL) 5 MG tablet TAKE 1 TABLET BY MOUTH TWICE DAILY BEFORE MEAL(S) 2/15/23   Florian Pride MD   clopidogrel (PLAVIX) 75 MG tablet Take 1 tablet by mouth once daily 22   Florian Pride MD   NIFEdipine (PROCARDIA XL) 30 MG extended release tablet Take 1 tablet by mouth once daily 22   Florian Pride MD   atorvastatin (LIPITOR) 80 MG tablet Take 1 tablet by mouth once daily 22   Florian Pride MD   SITagliptin (JANUVIA) 100 MG tablet Take 1 tablet by mouth daily 22   Florian Pride MD   ipratropium (ATROVENT) 0.03 % nasal spray 2 sprays by Each Nostril route 3 times daily as needed for Rhinitis 11/10/22   Adam Polanco DO   NONFORMULARY TOTAL BEETS (blood pressure)    Historical Provider, MD   melatonin 3 MG TABS tablet Take 1 tablet by mouth daily 2 tablet nightly 6mgh    Historical Provider, MD   nicotine (NICODERM CQ) 21 MG/24HR Place 1 patch onto the skin every 24 hours  Patient not taking: Reported on 3/8/2023    Historical Provider, MD   aspirin EC 81 MG EC tablet Take 1 tablet by mouth daily. 10/11/12   Fernanda Caldera III, MD       Current medications:    No current facility-administered medications for this visit. No current outpatient medications on file.      Facility-Administered

## 2023-04-21 NOTE — H&P
Gastroenterology Note                 Pre-operative History and Physical    Patient: Es Quiroz  : 1960  CSN:     History Obtained From:   Patient or guardian. HISTORY OF PRESENT ILLNESS:    The patient is a 60 y.o. male here for colonoscopy for Phx colon polyps. No Plavix x 1 wk      Past Medical History:    Past Medical History:   Diagnosis Date    Allergic rhinitis 10/11/2012    Cerebrovascular accident (CVA) due to occlusion of right cerebellar artery ([facility] 2022    [facility]  /    HTN (hypertension) 10/11/2012    Hyperlipidemia     Sleep apnea     Stroke (cerebrum) ([facility] 2022    Type 2 diabetes mellitus ([facility] 08/10/2022     Past Surgical History:    Past Surgical History:   Procedure Laterality Date    CARDIOVASCULAR STRESS TEST  Oct 2012    [facility], negative GXT    COLONOSCOPY N/A 2/3/2023    COLONOSCOPY performed by Sangeeta Agosto MD at [place] [address]     Medications Prior to Admission:   No current facility-administered medications on file prior to encounter.      Current Outpatient Medications on File Prior to Encounter   Medication Sig Dispense Refill    lisinopril (PRINIVIL;ZESTRIL) 40 MG tablet Take 1 tablet by mouth daily 30 tablet 2    vitamin E 400 UNIT capsule Take 1 capsule by mouth 2 times daily 30 capsule 3    glipiZIDE (GLUCOTROL) 5 MG tablet TAKE 1 TABLET BY MOUTH TWICE DAILY BEFORE MEAL(S) 60 tablet 2    clopidogrel (PLAVIX) 75 MG tablet Take 1 tablet by mouth once daily 30 tablet 5    NIFEdipine (PROCARDIA XL) 30 MG extended release tablet Take 1 tablet by mouth once daily 30 tablet 5    atorvastatin (LIPITOR) 80 MG tablet Take 1 tablet by mouth once daily 30 tablet 5    SITagliptin (JANUVIA) 100 MG tablet Take 1 tablet by mouth daily 90 tablet 1    ipratropium (ATROVENT) 0.03 % nasal spray 2 sprays by Each Nostril route 3 times daily as needed for Rhinitis 30 mL 3    NONFORMULARY TOTAL BEETS (blood pressure)      melatonin 3 MG TABS tablet Take 1

## 2023-04-21 NOTE — PROGRESS NOTES
Ambulatory Surgery/Procedure Discharge Note    Vitals:    04/21/23 0920   BP: 133/83   Pulse: 68   Resp: 16   Temp: 97.9 °F (36.6 °C)   SpO2: 95%       In: 240 [P.O.:240]  Out: -     Restroom use offered before discharge. Yes    Pain assessment:  level of pain (1-10, 10 severe),   Pain Level: 0    Pt and S.O./family states \"ready to go home\". Pt alert and oriented x4. IV removed. Denies N/V or pain. Discharge instructions given to pt and wife with pt permission. Pt and wife verbalized understanding of all instructions. Left with all belongings and discharge instructions. Pt tolerating oral fluids well and is passing flatus. Patient discharged to home/self care.  Patient discharged via wheel chair by transporter to waiting family/S.O.       4/21/2023 9:23 AM

## 2023-04-25 ENCOUNTER — HOSPITAL ENCOUNTER (OUTPATIENT)
Dept: PHYSICAL THERAPY | Age: 63
Setting detail: THERAPIES SERIES
Discharge: HOME | End: 2023-04-25
Payer: MEDICAID

## 2023-04-25 NOTE — FLOWSHEET NOTE
Physical Therapy  Cancellation/No-show Note  Patient Name:  Melva Britt  :  1960   Date:  2023  Cancelled visits to date:   No-shows to date:     For today's appointment patient:  []  Cancelled  []  Rescheduled appointment  [x]  No-show     Reason given by patient:  []  Patient ill  []  Conflicting appointment  []  No transportation    []  Conflict with work  [x]  No reason given  []  Other:     Comments:      Electronically signed by:  Sunita Hazel, PT, DPT

## 2023-04-27 ENCOUNTER — HOSPITAL ENCOUNTER (OUTPATIENT)
Dept: PHYSICAL THERAPY | Age: 63
Setting detail: THERAPIES SERIES
Discharge: HOME | End: 2023-04-27
Payer: MEDICAID

## 2023-04-27 PROCEDURE — 97112 NEUROMUSCULAR REEDUCATION: CPT | Performed by: PHYSICAL THERAPIST

## 2023-04-27 PROCEDURE — 97140 MANUAL THERAPY 1/> REGIONS: CPT | Performed by: PHYSICAL THERAPIST

## 2023-04-27 PROCEDURE — 97110 THERAPEUTIC EXERCISES: CPT | Performed by: PHYSICAL THERAPIST

## 2023-04-27 NOTE — THERAPY RECERTIFICATION
The [facility] and [facility], [address] [place] [address] Drive [address], [address] [place] [address]  Phone: [phone]   Fax:     [phone]                                                          Physical Therapy Re-Certification Plan of Care    Dear Brown Jean Baptiste*,    We had the pleasure of treating the following patient for physical therapy services at [address] [place] [address]. A summary of our findings can be found in the updated assessment below. This includes our plan of care. If you have any questions or concerns regarding these findings, please do not hesitate to contact me at the office phone number checked above. Thank you for the referral.     Physician Signature:________________________________Date:__________________  By signing above (or electronic signature), therapists plan is approved by physician      Overall Response to Treatment:   [x]Patient is responding well to treatment and improvement is noted with regards  to goals   []Patient should continue to improve in reasonable time if they continue HEP   []Patient has plateaued and is no longer responding to skilled PT intervention    []Patient is getting worse and would benefit from return to referring MD   []Patient unable to adhere to initial POC   [x]Other: Making gains in his AROM and strength - stressed to pt the importance of doing strength regularly in order to see optimal outcomes. Also, educated pt on doing his stretching and icing after increase in daily activities.      Date range of previous POC Visits: 3/14-    Total Visits: 20          Physical Therapy Daily Treatment Note  Date:  2023    Patient Name:  Meaghan Dinero    :  1960  MRN: [id number]  Restrictions/Precautions:    Medical/Treatment Diagnosis Information:  Diagnosis: M54.2 (ICD-10-CM) - Neck pain  Treatment Diagnosis: J20.2  Insurance/Certification information:  PT Insurance Information: [employer]

## 2023-05-11 ENCOUNTER — HOSPITAL ENCOUNTER (OUTPATIENT)
Dept: PHYSICAL THERAPY | Age: 63
Setting detail: THERAPIES SERIES
Discharge: HOME | End: 2023-05-11
Payer: MEDICAID

## 2023-05-11 PROCEDURE — 97112 NEUROMUSCULAR REEDUCATION: CPT | Performed by: PHYSICAL THERAPIST

## 2023-05-11 PROCEDURE — 97140 MANUAL THERAPY 1/> REGIONS: CPT | Performed by: PHYSICAL THERAPIST

## 2023-05-11 PROCEDURE — 97110 THERAPEUTIC EXERCISES: CPT | Performed by: PHYSICAL THERAPIST

## 2023-05-11 NOTE — FLOWSHEET NOTE
[facility],  [address]  Phone: [phone]   Fax:     [phone]                         Physical Therapy Daily Treatment Note  Date:  2023    Patient Name:  Lito Starks    :  1960  MRN: [id number]  Restrictions/Precautions:    Medical/Treatment Diagnosis Information:  Diagnosis: M54.2 (ICD-10-CM) - Neck pain  Treatment Diagnosis: A78.7  Insurance/Certification information:  PT Insurance Information: [facility]  Physician Information:  Dr. Vasyl Archibald   Has the plan of care been signed (Y/N):        [x]  Yes  []  No     Date of Patient follow up with Physician:       Is this a Progress Report:     [x]  Yes  []  No        If Yes:  Date Range for reporting period:  Beginning 3/14  Ending     Progress report will be due (10 Rx or 30 days whichever is less):       Recertification will be due (POC Duration  / 90 days whichever is less):         Visit # Insurance Allowable Auth Required   16  3/23    5   16 visits -      16 visits 3/24- [x]  Yes []  No        Functional Scale: UEFS 64/80   Date assessed:       Latex Allergy:  [x]NO      []YES  Preferred Language for Healthcare:   [x]English       []other:      Pain level:  0/10 ()     SUBJECTIVE:  Increased stiffness. Exercises are going well at home.        OBJECTIVE:   Observation:         Shoulder AROM/PROM   L R Comments   Flexion  WFL       Abduction   WFL       IR  L3/45 deg @ 90 deg abd      ER Top of head/  70 deg @ 90 deg                   Strength 3/23 L R Comments   Neck flexion (C1-2)         Neck side bend (C3)         Shoulder elevation (C4) 3+ 5     Shoulder abduction (C5)  5     Elbow flexion and /or wrist extension  (C6) 4- 5     Elbow extension and/or wrist flexion  (C7) 4- 5     Thumb extension and/or ulnar deviation ((C8)         Abduction and /or adduction of hand

## 2023-05-12 RX ORDER — GLIPIZIDE 5 MG/1
TABLET ORAL
Qty: 60 TABLET | Refills: 3 | Status: SHIPPED | OUTPATIENT
Start: 2023-05-12

## 2023-05-16 ENCOUNTER — HOSPITAL ENCOUNTER (OUTPATIENT)
Dept: PHYSICAL THERAPY | Age: 63
Setting detail: THERAPIES SERIES
Discharge: HOME | End: 2023-05-16
Payer: MEDICAID

## 2023-05-16 NOTE — FLOWSHEET NOTE
Physical Therapy  Cancellation/No-show Note  Patient Name:  Juan Daniel El  :  1960   Date:  2023  Cancelled visits to date: 03  No-shows to date:     For today's appointment patient:  [x]  Cancelled  []  Rescheduled appointment  []  No-show     Reason given by patient:  []  Patient ill  []  Conflicting appointment  []  No transportation    []  Conflict with work  []  No reason given  [x]  Other:     Comments:  waiting for insurance approval    Electronically signed by:  Oneal Rodriguez, PT, DPT

## 2023-05-18 ENCOUNTER — APPOINTMENT (OUTPATIENT)
Dept: PHYSICAL THERAPY | Age: 63
End: 2023-05-18
Payer: MEDICAID

## 2023-05-25 DIAGNOSIS — E11.69 TYPE 2 DIABETES MELLITUS WITH OTHER SPECIFIED COMPLICATION, WITHOUT LONG-TERM CURRENT USE OF INSULIN (HCC): ICD-10-CM

## 2023-05-25 RX ORDER — SITAGLIPTIN 100 MG/1
TABLET, FILM COATED ORAL
Qty: 90 TABLET | Refills: 1 | Status: SHIPPED | OUTPATIENT
Start: 2023-05-25

## 2023-06-07 DIAGNOSIS — G47.9 SLEEP DISORDER: ICD-10-CM

## 2023-06-07 DIAGNOSIS — I10 PRIMARY HYPERTENSION: ICD-10-CM

## 2023-06-08 ENCOUNTER — TELEPHONE (OUTPATIENT)
Dept: PULMONOLOGY | Age: 63
End: 2023-06-08

## 2023-06-08 RX ORDER — LISINOPRIL 40 MG/1
TABLET ORAL
Qty: 30 TABLET | Refills: 0 | Status: SHIPPED | OUTPATIENT
Start: 2023-06-08

## 2023-06-08 RX ORDER — TRAZODONE HYDROCHLORIDE 50 MG/1
50 TABLET ORAL NIGHTLY PRN
Qty: 90 TABLET | Refills: 0 | Status: SHIPPED | OUTPATIENT
Start: 2023-06-08

## 2023-06-08 NOTE — TELEPHONE ENCOUNTER
Medication:   Requested Prescriptions     Pending Prescriptions Disp Refills    lisinopril (PRINIVIL;ZESTRIL) 40 MG tablet [Pharmacy Med Name: Lisinopril 40 MG Oral Tablet] 30 tablet 0     Sig: Take 1 tablet by mouth once daily    traZODone (DESYREL) 50 MG tablet [Pharmacy Med Name: traZODone HCl 50 MG Oral Tablet] 90 tablet 0     Sig: TAKE 1 TABLET BY MOUTH NIGHTLY AS NEEDED FOR SLEEP     Last Filled:  3.31.23    Last appt: 3/8/2023   Next appt: Visit date not found    Last OARRS: No flowsheet data found.

## 2023-06-08 NOTE — TELEPHONE ENCOUNTER
Pt has appt for cpap  s/u today with [address] [place] [address]. Pt appears to already have a CPAP unit and per study needs a bipap unit. Spoke with Cameron from [address] [place] [address] and he is working on getting the cpap set up cancelled and the order for the bilevel in.

## 2023-06-08 NOTE — PROGRESS NOTES
Samuel Velez         : 1960  [facility]    Diagnosis: [x] CHARO (G47.33) [] CSA (G47.31) [] Apnea (G47.30)   Length of Need: [x] 18 Months [] 99 Months [] Other:    Machine (HIMANSHU!): [] Respironics Dream Station   2   Auto [x] ResMed AirCurve     Auto S10 [] Other:     []  CPAP () [x] Bilevel ()   Mode: [] Auto [] Spontaneous    Mode: [x] Auto [] Spontaneous       IPAP max 25 cmH2O  EPAP 14 cmH2O  PS 3 cmH2O     Comfort Settings:   - Ramp Pressure: 5 cmH2O                                        - Ramp time: 15 min                                     -  Flex/EPR - 3 full time                                    - For ResMed Bilevel (TiMax-4 sec   TiMin- 0.2 sec)     Humidifier: [x] Heated ()        [x] Water chamber replacement ()/ 1 per 6 months        Mask:   [] Nasal () /1 per 3 months [x] Full Face () /1 per 3 months   [] Patient choice -Size and fit mask [x] Patient Choice - Size and fit mask   [] Dispense:  [] Dispense:    [] Headgear () / 1 per 3 months [x] Headgear () / 1 per 3 months   [] Replacement Nasal Cushion ()/2 per month [x] Interface Replacement ()/1 per month   [] Replacement Nasal Pillows ()/2 per month         Tubing: [x] Heated ()/1 per 3 months    [] Standard ()/1 per 3 months [] Other:           Filters: [x] Non-disposable ()/1 per 6 months     [x] Ultra-Fine, Disposable ()/2 per month        Miscellaneous: [] Chin Strap ()/ 1 per 6 months [] O2 bleed-in:       LPM   [] Oximetry on CPAP/Bilevel []  Other:    [x] Modem: ()         Start Order Date: 23    MEDICAL JUSTIFICATION:  I, the undersigned, certify that the above prescribed supplies are medically necessary for this patients wellbeing. In my opinion, the supplies are both reasonable and necessary in reference to accepted standards of medicalpractice in treatment of this patients condition.     Moises Ravi MD      NPI: [id number]       Order

## 2023-06-15 RX ORDER — NIFEDIPINE 30 MG/1
30 TABLET, EXTENDED RELEASE ORAL DAILY
Qty: 30 TABLET | Refills: 5 | Status: SHIPPED | OUTPATIENT
Start: 2023-06-15

## 2023-06-15 NOTE — TELEPHONE ENCOUNTER
----- Message from [facility] sent at 6/15/2023 11:16 AM EDT -----  Subject: Refill Request    QUESTIONS  Name of Medication? NIFEdipine (PROCARDIA XL) 30 MG extended release   tablet  Patient-reported dosage and instructions? once a day  How many days do you have left? 5  Preferred Pharmacy? [facility]  Pharmacy phone number (if available)? [phone]  ---------------------------------------------------------------------------  --------------  Nichelle ALATORRE  What is the best way for the office to contact you? OK to leave message on   voicemail,OK to respond with electronic message via oboxo portal (only   for patients who have registered oboxo account)  Preferred Call Back Phone Number? [phone]  ---------------------------------------------------------------------------  --------------  SCRIPT ANSWERS  Relationship to Patient? Spouse/Partner  Representative Name? Azucena(Wife)  Is the representative on the Communication Release of Information (NASIMA)   form in Epic?  Yes

## 2023-06-15 NOTE — TELEPHONE ENCOUNTER
Medication:   Requested Prescriptions     Pending Prescriptions Disp Refills    NIFEdipine (PROCARDIA XL) 30 MG extended release tablet 30 tablet 5     Sig: Take 1 tablet by mouth daily     Last Filled:  12.16.22    Last appt: 3/8/2023   Next appt: Visit date not found    Last OARRS: No flowsheet data found.

## 2023-06-19 ENCOUNTER — TELEPHONE (OUTPATIENT)
Dept: PULMONOLOGY | Age: 63
End: 2023-06-19

## 2023-06-19 NOTE — TELEPHONE ENCOUNTER
Per Suresh Anders from [facility] notes less than 3 months old are needed.  Dr. Monet Hidden notified

## 2023-07-01 DIAGNOSIS — I10 PRIMARY HYPERTENSION: ICD-10-CM

## 2023-07-03 RX ORDER — LISINOPRIL 40 MG/1
TABLET ORAL
Qty: 30 TABLET | Refills: 0 | Status: SHIPPED | OUTPATIENT
Start: 2023-07-03

## 2023-07-03 NOTE — TELEPHONE ENCOUNTER
Medication:   Requested Prescriptions     Pending Prescriptions Disp Refills    lisinopril (PRINIVIL;ZESTRIL) 40 MG tablet [Pharmacy Med Name: Lisinopril 40 MG Oral Tablet] 30 tablet 0     Sig: Take 1 tablet by mouth once daily   Last Filled:  6.8.23    Last appt: 3/8/2023   Next appt: Visit date not found    Last OARRS: No flowsheet data found.

## 2023-07-24 DIAGNOSIS — I10 PRIMARY HYPERTENSION: ICD-10-CM

## 2023-07-24 NOTE — TELEPHONE ENCOUNTER
Medication:   Requested Prescriptions     Pending Prescriptions Disp Refills    lisinopril (PRINIVIL;ZESTRIL) 40 MG tablet [Pharmacy Med Name: Lisinopril 40 MG Oral Tablet] 30 tablet 0     Sig: Take 1 tablet by mouth once daily     Last Filled:  7.3.23    Last appt: 3/8/2023   Next appt: Visit date not found    Last OARRS: No flowsheet data found.

## 2023-07-25 RX ORDER — LISINOPRIL 40 MG/1
TABLET ORAL
Qty: 30 TABLET | Refills: 5 | Status: SHIPPED | OUTPATIENT
Start: 2023-07-25

## 2023-07-26 PROBLEM — G47.33 OBSTRUCTIVE SLEEP APNEA SYNDROME: Status: ACTIVE | Noted: 2023-07-26

## 2023-07-31 RX ORDER — ATORVASTATIN CALCIUM 80 MG/1
TABLET, FILM COATED ORAL
Qty: 30 TABLET | Refills: 5 | Status: SHIPPED | OUTPATIENT
Start: 2023-07-31

## 2023-07-31 RX ORDER — CLOPIDOGREL BISULFATE 75 MG/1
TABLET ORAL
Qty: 30 TABLET | Refills: 5 | Status: SHIPPED | OUTPATIENT
Start: 2023-07-31

## 2023-07-31 NOTE — TELEPHONE ENCOUNTER
Medication:   Requested Prescriptions     Pending Prescriptions Disp Refills    atorvastatin (LIPITOR) 80 MG tablet [Pharmacy Med Name: Atorvastatin Calcium 80 MG Oral Tablet] 30 tablet 0     Sig: Take 1 tablet by mouth once daily     Last Filled:  6.12.23    Last appt: 3/8/2023   Next appt: Visit date not found    Last Lipid:   Lab Results   Component Value Date/Time    CHOL 156 03/08/2023 02:43 PM    TRIG 153 03/08/2023 02:43 PM    HDL 40 03/08/2023 02:43 PM    [facility] 85 03/08/2023 02:43 PM

## 2023-07-31 NOTE — TELEPHONE ENCOUNTER
Medication:   Requested Prescriptions     Pending Prescriptions Disp Refills    clopidogrel (PLAVIX) 75 MG tablet [Pharmacy Med Name: Clopidogrel Bisulfate 75 MG Oral Tablet] 30 tablet 0     Sig: Take 1 tablet by mouth once daily     Last Filled:  6.12.23    Last appt: 3/8/2023   Next appt:   Last OARRS: No flowsheet data found.

## 2023-08-21 RX ORDER — GLIPIZIDE 5 MG/1
TABLET ORAL
Qty: 60 TABLET | Refills: 2 | Status: SHIPPED | OUTPATIENT
Start: 2023-08-21

## 2023-08-21 NOTE — TELEPHONE ENCOUNTER
Medication:   Requested Prescriptions     Pending Prescriptions Disp Refills    glipiZIDE (GLUCOTROL) 5 MG tablet [Pharmacy Med Name: glipiZIDE 5 MG Oral Tablet] 60 tablet 0     Sig: TAKE 1 TABLET BY MOUTH TWICE DAILY BEFORE MEAL(S)     Last Filled:  5.12.23    Last appt: 3/8/2023   Next appt: Visit date not found  Left message for pt to call and make appt      Last Labs DM:   Lab Results   Component Value Date/Time    LABA1C 6.9 03/08/2023 02:43 PM

## 2023-08-23 ENCOUNTER — OFFICE VISIT (OUTPATIENT)
Dept: PRIMARY CARE CLINIC | Age: 63
End: 2023-08-23
Payer: MEDICAID

## 2023-08-23 VITALS
WEIGHT: 217.8 LBS | DIASTOLIC BLOOD PRESSURE: 74 MMHG | HEART RATE: 81 BPM | TEMPERATURE: 97.4 F | OXYGEN SATURATION: 98 % | BODY MASS INDEX: 29.95 KG/M2 | SYSTOLIC BLOOD PRESSURE: 136 MMHG

## 2023-08-23 DIAGNOSIS — Z11.4 ENCOUNTER FOR SCREENING FOR HIV: ICD-10-CM

## 2023-08-23 DIAGNOSIS — Z11.59 ENCOUNTER FOR HCV SCREENING TEST FOR LOW RISK PATIENT: ICD-10-CM

## 2023-08-23 DIAGNOSIS — E11.69 TYPE 2 DIABETES MELLITUS WITH OTHER SPECIFIED COMPLICATION, WITHOUT LONG-TERM CURRENT USE OF INSULIN (HCC): Primary | ICD-10-CM

## 2023-08-23 DIAGNOSIS — N42.9 PROSTATE DISORDER: ICD-10-CM

## 2023-08-23 DIAGNOSIS — E78.9 LIPID DISORDER: ICD-10-CM

## 2023-08-23 DIAGNOSIS — I10 PRIMARY HYPERTENSION: ICD-10-CM

## 2023-08-23 DIAGNOSIS — Z79.01 CHRONIC ANTICOAGULATION: ICD-10-CM

## 2023-08-23 LAB — HBA1C MFR BLD: 7.1 %

## 2023-08-23 PROCEDURE — 3075F SYST BP GE 130 - 139MM HG: CPT | Performed by: FAMILY MEDICINE

## 2023-08-23 PROCEDURE — 2022F DILAT RTA XM EVC RTNOPTHY: CPT | Performed by: FAMILY MEDICINE

## 2023-08-23 PROCEDURE — 83036 HEMOGLOBIN GLYCOSYLATED A1C: CPT | Performed by: FAMILY MEDICINE

## 2023-08-23 PROCEDURE — 99214 OFFICE O/P EST MOD 30 MIN: CPT | Performed by: FAMILY MEDICINE

## 2023-08-23 PROCEDURE — 3017F COLORECTAL CA SCREEN DOC REV: CPT | Performed by: FAMILY MEDICINE

## 2023-08-23 PROCEDURE — G8419 CALC BMI OUT NRM PARAM NOF/U: HCPCS | Performed by: FAMILY MEDICINE

## 2023-08-23 PROCEDURE — 3078F DIAST BP <80 MM HG: CPT | Performed by: FAMILY MEDICINE

## 2023-08-23 PROCEDURE — 1036F TOBACCO NON-USER: CPT | Performed by: FAMILY MEDICINE

## 2023-08-23 PROCEDURE — G8427 DOCREV CUR MEDS BY ELIG CLIN: HCPCS | Performed by: FAMILY MEDICINE

## 2023-08-23 PROCEDURE — 3051F HG A1C>EQUAL 7.0%<8.0%: CPT | Performed by: FAMILY MEDICINE

## 2023-08-23 SDOH — ECONOMIC STABILITY: INCOME INSECURITY: HOW HARD IS IT FOR YOU TO PAY FOR THE VERY BASICS LIKE FOOD, HOUSING, MEDICAL CARE, AND HEATING?: NOT HARD AT ALL

## 2023-08-23 SDOH — ECONOMIC STABILITY: HOUSING INSECURITY
IN THE LAST 12 MONTHS, WAS THERE A TIME WHEN YOU DID NOT HAVE A STEADY PLACE TO SLEEP OR SLEPT IN A SHELTER (INCLUDING NOW)?: NO

## 2023-08-23 SDOH — ECONOMIC STABILITY: FOOD INSECURITY: WITHIN THE PAST 12 MONTHS, THE FOOD YOU BOUGHT JUST DIDN'T LAST AND YOU DIDN'T HAVE MONEY TO GET MORE.: NEVER TRUE

## 2023-08-23 SDOH — ECONOMIC STABILITY: FOOD INSECURITY: WITHIN THE PAST 12 MONTHS, YOU WORRIED THAT YOUR FOOD WOULD RUN OUT BEFORE YOU GOT MONEY TO BUY MORE.: NEVER TRUE

## 2023-08-23 ASSESSMENT — ENCOUNTER SYMPTOMS
APNEA: 1
ABDOMINAL PAIN: 0
BACK PAIN: 1

## 2023-09-09 DIAGNOSIS — G47.9 SLEEP DISORDER: ICD-10-CM

## 2023-09-11 RX ORDER — TRAZODONE HYDROCHLORIDE 50 MG/1
50 TABLET ORAL NIGHTLY PRN
Qty: 90 TABLET | Refills: 0 | Status: SHIPPED | OUTPATIENT
Start: 2023-09-11

## 2023-09-11 NOTE — TELEPHONE ENCOUNTER
Medication:   Requested Prescriptions     Pending Prescriptions Disp Refills    traZODone (DESYREL) 50 MG tablet [Pharmacy Med Name: traZODone HCl 50 MG Oral Tablet] 90 tablet 0     Sig: TAKE 1 TABLET BY MOUTH NIGHTLY AS NEEDED FOR SLEEP     Last Filled:  06/08/2023    Last appt: 8/23/2023   Next appt: Visit date not found    Last OARRS:        No data to display

## 2023-10-04 PROBLEM — E66.811 OBESITY (BMI 30.0-34.9): Status: ACTIVE | Noted: 2023-10-04

## 2023-10-04 PROBLEM — E66.9 OBESITY (BMI 30.0-34.9): Status: ACTIVE | Noted: 2023-10-04

## 2023-10-30 RX ORDER — GLIPIZIDE 5 MG/1
TABLET ORAL
Qty: 60 TABLET | Refills: 3 | Status: SHIPPED | OUTPATIENT
Start: 2023-10-30

## 2023-10-30 NOTE — TELEPHONE ENCOUNTER
Medication:   Requested Prescriptions     Pending Prescriptions Disp Refills    glipiZIDE (GLUCOTROL) 5 MG tablet [Pharmacy Med Name: glipiZIDE 5 MG Oral Tablet] 60 tablet 0     Sig: TAKE 1 TABLET BY MOUTH TWICE DAILY BEFORE MEAL(S)     Last Filled:  8/21/2023    Last appt: 8/23/2023   Next appt: Visit date not found    Last Labs DM:   Lab Results   Component Value Date/Time    LABA1C 7.1 08/23/2023 11:55 AM

## 2023-11-17 DIAGNOSIS — E11.69 TYPE 2 DIABETES MELLITUS WITH OTHER SPECIFIED COMPLICATION, WITHOUT LONG-TERM CURRENT USE OF INSULIN (HCC): ICD-10-CM

## 2023-11-17 RX ORDER — SITAGLIPTIN 100 MG/1
TABLET, FILM COATED ORAL
Qty: 90 TABLET | Refills: 0 | Status: SHIPPED | OUTPATIENT
Start: 2023-11-17

## 2023-11-17 NOTE — TELEPHONE ENCOUNTER
Medication:   Requested Prescriptions     Pending Prescriptions Disp Refills    JANUVIA 100 MG tablet [Pharmacy Med Name: Januvia 100 MG Oral Tablet] 90 tablet 0     Sig: Take 1 tablet by mouth once daily     Last Filled:  05/25/2023    Last appt: 8/23/2023   Next appt: Visit date not found    Last Labs DM:   Lab Results   Component Value Date/Time    LABA1C 7.1 08/23/2023 11:55 AM

## 2023-12-06 DIAGNOSIS — G47.9 SLEEP DISORDER: ICD-10-CM

## 2023-12-06 RX ORDER — TRAZODONE HYDROCHLORIDE 50 MG/1
50 TABLET ORAL NIGHTLY PRN
Qty: 90 TABLET | Refills: 0 | Status: SHIPPED | OUTPATIENT
Start: 2023-12-06

## 2023-12-06 NOTE — TELEPHONE ENCOUNTER
Medication:   Requested Prescriptions     Pending Prescriptions Disp Refills    traZODone (DESYREL) 50 MG tablet [Pharmacy Med Name: traZODone HCl 50 MG Oral Tablet] 90 tablet 0     Sig: TAKE 1 TABLET BY MOUTH NIGHTLY AS NEEDED FOR SLEEP     Last Filled:  9.11.23    Last appt: 8/23/2023   Next appt: Visit date not found    Last OARRS:        No data to display

## 2024-01-03 DIAGNOSIS — I10 PRIMARY HYPERTENSION: ICD-10-CM

## 2024-01-03 DIAGNOSIS — N42.9 PROSTATE DISORDER: ICD-10-CM

## 2024-01-03 DIAGNOSIS — Z11.59 ENCOUNTER FOR HCV SCREENING TEST FOR LOW RISK PATIENT: ICD-10-CM

## 2024-01-03 DIAGNOSIS — E11.69 TYPE 2 DIABETES MELLITUS WITH OTHER SPECIFIED COMPLICATION, WITHOUT LONG-TERM CURRENT USE OF INSULIN (HCC): ICD-10-CM

## 2024-01-03 DIAGNOSIS — E78.9 LIPID DISORDER: ICD-10-CM

## 2024-01-03 LAB
ALBUMIN SERPL-MCNC: 5.1 G/DL (ref 3.4–5)
ALBUMIN/GLOB SERPL: 1.6 {RATIO} (ref 1.1–2.2)
ALP SERPL-CCNC: 74 U/L (ref 40–129)
ALT SERPL-CCNC: 57 U/L (ref 10–40)
ANION GAP SERPL CALCULATED.3IONS-SCNC: 17 MMOL/L (ref 3–16)
AST SERPL-CCNC: 27 U/L (ref 15–37)
BASOPHILS # BLD: 0.1 K/UL (ref 0–0.2)
BILIRUB SERPL-MCNC: 0.4 MG/DL (ref 0–1)
BUN SERPL-MCNC: 31 MG/DL (ref 7–20)
CALCIUM SERPL-MCNC: 9.8 MG/DL (ref 8.3–10.6)
CHLORIDE SERPL-SCNC: 101 MMOL/L (ref 99–110)
CHOLEST SERPL-MCNC: 163 MG/DL (ref 0–199)
CO2 SERPL-SCNC: 24 MMOL/L (ref 21–32)
CREAT SERPL-MCNC: 1.2 MG/DL (ref 0.8–1.3)
DEPRECATED RDW RBC AUTO: 13.3 % (ref 12.4–15.4)
EOSINOPHIL # BLD: 0.3 K/UL (ref 0–0.6)
EOSINOPHIL NFR BLD: 3.4 %
GFR SERPLBLD CREATININE-BSD FMLA CKD-EPI: >60 ML/MIN/{1.73_M2}
GLUCOSE SERPL-MCNC: 233 MG/DL (ref 70–99)
HCT VFR BLD AUTO: 43.9 % (ref 40.5–52.5)
HCV AB SERPL QL IA: NORMAL
HDLC SERPL-MCNC: 33 MG/DL (ref 40–60)
HGB BLD-MCNC: 14.6 G/DL (ref 13.5–17.5)
LDLC SERPL CALC-MCNC: 85 MG/DL
LYMPHOCYTES # BLD: 2.2 K/UL (ref 1–5.1)
LYMPHOCYTES NFR BLD: 28.2 %
MCH RBC QN AUTO: 29.9 PG (ref 26–34)
MCHC RBC AUTO-ENTMCNC: 33.2 G/DL (ref 31–36)
MCV RBC AUTO: 90.1 FL (ref 80–100)
MONOCYTES # BLD: 0.8 K/UL (ref 0–1.3)
MONOCYTES NFR BLD: 10.3 %
NEUTROPHILS # BLD: 4.4 K/UL (ref 1.7–7.7)
NEUTROPHILS NFR BLD: 57.2 %
PLATELET # BLD AUTO: 220 K/UL (ref 135–450)
PMV BLD AUTO: 9.7 FL (ref 5–10.5)
POTASSIUM SERPL-SCNC: 4.9 MMOL/L (ref 3.5–5.1)
PROT SERPL-MCNC: 8.2 G/DL (ref 6.4–8.2)
PSA SERPL DL<=0.01 NG/ML-MCNC: 0.97 NG/ML (ref 0–4)
RBC # BLD AUTO: 4.88 M/UL (ref 4.2–5.9)
SODIUM SERPL-SCNC: 142 MMOL/L (ref 136–145)
TRIGL SERPL-MCNC: 226 MG/DL (ref 0–150)
VLDLC SERPL CALC-MCNC: 45 MG/DL
WBC # BLD AUTO: 7.7 K/UL (ref 4–11)

## 2024-01-04 DIAGNOSIS — E11.69 TYPE 2 DIABETES MELLITUS WITH OTHER SPECIFIED COMPLICATION, WITHOUT LONG-TERM CURRENT USE OF INSULIN (HCC): Primary | Chronic | ICD-10-CM

## 2024-01-04 DIAGNOSIS — E11.69 TYPE 2 DIABETES MELLITUS WITH OTHER SPECIFIED COMPLICATION, WITHOUT LONG-TERM CURRENT USE OF INSULIN (HCC): Chronic | ICD-10-CM

## 2024-01-06 LAB
EST. AVERAGE GLUCOSE BLD GHB EST-MCNC: 205.9 MG/DL
HBA1C MFR BLD: 8.8 %

## 2024-01-10 ENCOUNTER — OFFICE VISIT (OUTPATIENT)
Dept: PRIMARY CARE CLINIC | Age: 64
End: 2024-01-10
Payer: MEDICAID

## 2024-01-10 VITALS
WEIGHT: 223.4 LBS | OXYGEN SATURATION: 98 % | HEART RATE: 95 BPM | TEMPERATURE: 97.9 F | BODY MASS INDEX: 30.72 KG/M2 | DIASTOLIC BLOOD PRESSURE: 80 MMHG | SYSTOLIC BLOOD PRESSURE: 144 MMHG

## 2024-01-10 DIAGNOSIS — Z79.01 CHRONIC ANTICOAGULATION: ICD-10-CM

## 2024-01-10 DIAGNOSIS — I10 PRIMARY HYPERTENSION: ICD-10-CM

## 2024-01-10 DIAGNOSIS — E11.69 TYPE 2 DIABETES MELLITUS WITH OTHER SPECIFIED COMPLICATION, WITHOUT LONG-TERM CURRENT USE OF INSULIN (HCC): Primary | ICD-10-CM

## 2024-01-10 DIAGNOSIS — G89.29 CHRONIC LOW BACK PAIN, UNSPECIFIED BACK PAIN LATERALITY, UNSPECIFIED WHETHER SCIATICA PRESENT: ICD-10-CM

## 2024-01-10 DIAGNOSIS — M54.50 CHRONIC LOW BACK PAIN, UNSPECIFIED BACK PAIN LATERALITY, UNSPECIFIED WHETHER SCIATICA PRESENT: ICD-10-CM

## 2024-01-10 DIAGNOSIS — E78.9 LIPID DISORDER: ICD-10-CM

## 2024-01-10 DIAGNOSIS — G47.33 OSA (OBSTRUCTIVE SLEEP APNEA): ICD-10-CM

## 2024-01-10 PROCEDURE — 3052F HG A1C>EQUAL 8.0%<EQUAL 9.0%: CPT | Performed by: FAMILY MEDICINE

## 2024-01-10 PROCEDURE — 99214 OFFICE O/P EST MOD 30 MIN: CPT | Performed by: FAMILY MEDICINE

## 2024-01-10 PROCEDURE — 1036F TOBACCO NON-USER: CPT | Performed by: FAMILY MEDICINE

## 2024-01-10 PROCEDURE — G8484 FLU IMMUNIZE NO ADMIN: HCPCS | Performed by: FAMILY MEDICINE

## 2024-01-10 PROCEDURE — 3017F COLORECTAL CA SCREEN DOC REV: CPT | Performed by: FAMILY MEDICINE

## 2024-01-10 PROCEDURE — 3079F DIAST BP 80-89 MM HG: CPT | Performed by: FAMILY MEDICINE

## 2024-01-10 PROCEDURE — G8427 DOCREV CUR MEDS BY ELIG CLIN: HCPCS | Performed by: FAMILY MEDICINE

## 2024-01-10 PROCEDURE — G8417 CALC BMI ABV UP PARAM F/U: HCPCS | Performed by: FAMILY MEDICINE

## 2024-01-10 PROCEDURE — 2022F DILAT RTA XM EVC RTNOPTHY: CPT | Performed by: FAMILY MEDICINE

## 2024-01-10 PROCEDURE — 3077F SYST BP >= 140 MM HG: CPT | Performed by: FAMILY MEDICINE

## 2024-01-10 RX ORDER — PIOGLITAZONEHYDROCHLORIDE 30 MG/1
30 TABLET ORAL DAILY
Qty: 90 TABLET | Refills: 1 | Status: SHIPPED | OUTPATIENT
Start: 2024-01-10

## 2024-01-10 ASSESSMENT — PATIENT HEALTH QUESTIONNAIRE - PHQ9
SUM OF ALL RESPONSES TO PHQ9 QUESTIONS 1 & 2: 0
SUM OF ALL RESPONSES TO PHQ QUESTIONS 1-9: 0
2. FEELING DOWN, DEPRESSED OR HOPELESS: 0
SUM OF ALL RESPONSES TO PHQ QUESTIONS 1-9: 0
1. LITTLE INTEREST OR PLEASURE IN DOING THINGS: 0
SUM OF ALL RESPONSES TO PHQ QUESTIONS 1-9: 0
SUM OF ALL RESPONSES TO PHQ QUESTIONS 1-9: 0

## 2024-01-10 ASSESSMENT — ENCOUNTER SYMPTOMS
APNEA: 1
BACK PAIN: 1
EYES NEGATIVE: 1

## 2024-01-10 NOTE — PROGRESS NOTES
ALT 57 (H) 01/03/2024 1032    BILITOT 0.4 01/03/2024 1032        No results found for: \"TSH\", \"TSHREFLEX\", \"TSHFT4\", \"TSHELE\", \"EFH4CVQ\", \"TSHHS\"  Hemoglobin A1C   Date Value Ref Range Status   01/03/2024 8.8 See comment % Final     Comment:     Comment:  Diagnosis of Diabetes: > or = 6.5%  Increased risk of diabetes (Prediabetes): 5.7-6.4%  Glycemic Control: Nonpregnant Adults: <7.0%                    Pregnant: <6.0%         Lab Results   Component Value Date    PSA 0.97 01/03/2024     No results found for: \"TSH\", \"TSHREFLEX\", \"TSHFT4\", \"TSHELE\", \"XLB1STV\", \"TSHHS\"      Review of Systems   Constitutional:  Negative for chills, diaphoresis and fever.   HENT:  Negative for congestion.    Eyes: Negative.    Respiratory:  Positive for apnea.         Quit tobacco 20 year ago  Hx Dip   Dx CHARO he couldn't tolerate CPAP   Gastrointestinal:         Glucophage + give him run   Endocrine:        NIDDM   Musculoskeletal:  Positive for back pain.        Dr Matt Moreno  Physical Therapy x few session  Lt Knee hurt once a while  Fell 2 month ago  Talked to Tiffanie       OBJECTIVE:  BP (!) 144/80 (Site: Left Upper Arm, Position: Sitting, Cuff Size: Large Adult)   Pulse 95   Temp 97.9 °F (36.6 °C) (Infrared)   Wt 101.3 kg (223 lb 6.4 oz)   SpO2 98%   BMI 30.72 kg/m²   Physical Exam  HENT:      Head: Normocephalic and atraumatic.      Right Ear: Tympanic membrane normal.      Left Ear: Tympanic membrane normal.   Eyes:      Extraocular Movements: Extraocular movements intact.      Pupils: Pupils are equal, round, and reactive to light.   Cardiovascular:      Rate and Rhythm: Normal rate and regular rhythm.      Pulses: Normal pulses.      Heart sounds: Normal heart sounds.   Pulmonary:      Effort: Pulmonary effort is normal.      Breath sounds: Normal breath sounds. No wheezing.   Musculoskeletal:      Cervical back: Normal range of motion and neck supple.      Right lower leg: No edema.      Left lower leg: No edema.

## 2024-01-16 NOTE — TELEPHONE ENCOUNTER
Medication:   Requested Prescriptions     Pending Prescriptions Disp Refills    clopidogrel (PLAVIX) 75 MG tablet [Pharmacy Med Name: Clopidogrel Bisulfate 75 MG Oral Tablet] 30 tablet 0     Sig: Take 1 tablet by mouth once daily    atorvastatin (LIPITOR) 80 MG tablet [Pharmacy Med Name: Atorvastatin Calcium 80 MG Oral Tablet] 30 tablet 0     Sig: Take 1 tablet by mouth once daily     Last Filled:  7.31.23 7.31.23    Last appt: 1/10/2024   Next appt: 5/10/2024    Last Lipid:   Lab Results   Component Value Date/Time    CHOL 163 01/03/2024 10:32 AM    TRIG 226 01/03/2024 10:32 AM    HDL 33 01/03/2024 10:32 AM    LDLCALC 85 01/03/2024 10:32 AM

## 2024-01-17 RX ORDER — ATORVASTATIN CALCIUM 80 MG/1
TABLET, FILM COATED ORAL
Qty: 30 TABLET | Refills: 3 | Status: SHIPPED | OUTPATIENT
Start: 2024-01-17

## 2024-01-17 RX ORDER — CLOPIDOGREL BISULFATE 75 MG/1
TABLET ORAL
Qty: 30 TABLET | Refills: 3 | Status: SHIPPED | OUTPATIENT
Start: 2024-01-17

## 2024-01-25 DIAGNOSIS — I10 PRIMARY HYPERTENSION: ICD-10-CM

## 2024-01-25 RX ORDER — LISINOPRIL 40 MG/1
TABLET ORAL
Qty: 30 TABLET | Refills: 5 | Status: SHIPPED | OUTPATIENT
Start: 2024-01-25

## 2024-01-25 NOTE — TELEPHONE ENCOUNTER
Medication:   Requested Prescriptions     Pending Prescriptions Disp Refills    lisinopril (PRINIVIL;ZESTRIL) 40 MG tablet [Pharmacy Med Name: Lisinopril 40 MG Oral Tablet] 30 tablet 0     Sig: Take 1 tablet by mouth once daily     Last filled: 7.25.23  Last appt: 1/10/2024   Next appt: 5/10/2024    Last OARRS:        No data to display

## 2024-01-29 RX ORDER — GLIPIZIDE 5 MG/1
TABLET ORAL
Qty: 60 TABLET | Refills: 5 | Status: SHIPPED | OUTPATIENT
Start: 2024-01-29

## 2024-01-29 NOTE — TELEPHONE ENCOUNTER
Medication:   Requested Prescriptions     Pending Prescriptions Disp Refills    glipiZIDE (GLUCOTROL) 5 MG tablet [Pharmacy Med Name: glipiZIDE 5 MG Oral Tablet] 60 tablet 0     Sig: TAKE 1 TABLET BY MOUTH TWICE DAILY BEFORE MEAL(S)     Last Filled:  10.30.23    Last appt: 1/10/2024   Next appt: 5/10/2024    Last OARRS:        No data to display

## 2024-02-06 NOTE — TELEPHONE ENCOUNTER
Spouse  called for the patient and requested to refill the following medications      glipiZIDE (GLUCOTROL) 5 MG tablet       The preferred pharmacy    [address] [facility] [address] [facility] - P [phone] - F [phone]  [address] [place], [place] [address] [address]  Phone: [phone]  Fax: [phone]     Please review    thanks

## 2024-02-06 NOTE — TELEPHONE ENCOUNTER
Medication:   Requested Prescriptions     Pending Prescriptions Disp Refills    glipiZIDE (GLUCOTROL) 5 MG tablet 60 tablet 5     Last Filled:  1.29.24    Last appt: 1/10/2024   Next appt: 5/10/2024    Last Labs DM:   Lab Results   Component Value Date/Time    LABA1C 8.8 01/03/2024 10:32 AM

## 2024-02-07 RX ORDER — GLIPIZIDE 5 MG/1
TABLET ORAL
Qty: 60 TABLET | Refills: 5 | OUTPATIENT
Start: 2024-02-07

## 2024-02-15 DIAGNOSIS — E11.69 TYPE 2 DIABETES MELLITUS WITH OTHER SPECIFIED COMPLICATION, WITHOUT LONG-TERM CURRENT USE OF INSULIN (HCC): ICD-10-CM

## 2024-02-15 NOTE — TELEPHONE ENCOUNTER
Medication:   Requested Prescriptions     Pending Prescriptions Disp Refills    JANUVIA 100 MG tablet [Pharmacy Med Name: Januvia 100 MG Oral Tablet] 90 tablet 0     Sig: Take 1 tablet by mouth once daily     Last Filled:  11/17/2023    Last appt: 1/10/2024   Next appt: 5/10/2024    Last Labs DM:   Lab Results   Component Value Date/Time    LABA1C 8.8 01/03/2024 10:32 AM

## 2024-02-16 RX ORDER — SITAGLIPTIN 100 MG/1
TABLET, FILM COATED ORAL
Qty: 90 TABLET | Refills: 0 | Status: SHIPPED | OUTPATIENT
Start: 2024-02-16

## 2024-03-11 DIAGNOSIS — G47.9 SLEEP DISORDER: ICD-10-CM

## 2024-03-11 NOTE — TELEPHONE ENCOUNTER
Medication:   Requested Prescriptions     Pending Prescriptions Disp Refills    traZODone (DESYREL) 50 MG tablet [Pharmacy Med Name: traZODone HCl 50 MG Oral Tablet] 90 tablet 0     Sig: TAKE 1 TABLET BY MOUTH NIGHTLY AS NEEDED FOR SLEEP     Last Filled:  12.6.23    Last appt: 1/10/2024   Next appt: 5/10/2024    Last OARRS:        No data to display

## 2024-03-12 RX ORDER — TRAZODONE HYDROCHLORIDE 50 MG/1
50 TABLET ORAL NIGHTLY PRN
Qty: 90 TABLET | Refills: 0 | Status: SHIPPED | OUTPATIENT
Start: 2024-03-12

## 2024-03-18 RX ORDER — GLIPIZIDE 5 MG/1
TABLET ORAL
Qty: 60 TABLET | Refills: 5 | Status: SHIPPED | OUTPATIENT
Start: 2024-03-18

## 2024-03-18 NOTE — TELEPHONE ENCOUNTER
Medication:   Requested Prescriptions     Pending Prescriptions Disp Refills    glipiZIDE (GLUCOTROL) 5 MG tablet 60 tablet 5     Sig: TAKE 1 TABLET BY MOUTH TWICE DAILY BEFORE MEAL(S)     Last Filled:  1/29/2024    Last appt: 1/10/2024   Next appt: 5/10/2024    Last Lipid:   Lab Results   Component Value Date/Time    CHOL 163 01/03/2024 10:32 AM    TRIG 226 01/03/2024 10:32 AM    HDL 33 01/03/2024 10:32 AM    LDLCALC 85 01/03/2024 10:32 AM

## 2024-04-09 RX ORDER — NIFEDIPINE 30 MG/1
30 TABLET, EXTENDED RELEASE ORAL DAILY
Qty: 30 TABLET | Refills: 0 | Status: SHIPPED | OUTPATIENT
Start: 2024-04-09

## 2024-04-09 NOTE — TELEPHONE ENCOUNTER
Medication:   Requested Prescriptions     Pending Prescriptions Disp Refills    NIFEdipine (PROCARDIA XL) 30 MG extended release tablet 30 tablet 3     Sig: Take 1 tablet by mouth daily     Last Filled:  12.20.23    Last appt: 1/10/2024   Next appt: 5/10/2024    Last OARRS:        No data to display

## 2024-05-03 RX ORDER — ATORVASTATIN CALCIUM 80 MG/1
TABLET, FILM COATED ORAL
Qty: 90 TABLET | Refills: 1 | Status: SHIPPED | OUTPATIENT
Start: 2024-05-03

## 2024-05-03 NOTE — TELEPHONE ENCOUNTER
Medication:   Requested Prescriptions     Pending Prescriptions Disp Refills    atorvastatin (LIPITOR) 80 MG tablet [Pharmacy Med Name: Atorvastatin Calcium 80 MG Oral Tablet] 30 tablet 0     Sig: Take 1 tablet by mouth once daily     Last Filled:  01/17/2024    Last appt: 1/10/2024   Next appt: 5/10/2024    Last Lipid:   Lab Results   Component Value Date/Time    CHOL 163 01/03/2024 10:32 AM    TRIG 226 01/03/2024 10:32 AM    HDL 33 01/03/2024 10:32 AM

## 2024-05-06 RX ORDER — NIFEDIPINE 30 MG/1
30 TABLET, EXTENDED RELEASE ORAL DAILY
Qty: 90 TABLET | Refills: 0 | Status: SHIPPED | OUTPATIENT
Start: 2024-05-06

## 2024-05-06 NOTE — TELEPHONE ENCOUNTER
Medication:   Requested Prescriptions     Pending Prescriptions Disp Refills    NIFEdipine (PROCARDIA XL) 30 MG extended release tablet [Pharmacy Med Name: NIFEdipine ER Osmotic Release 30 MG Oral Tablet Extended Release 24 Hour] 30 tablet 0     Sig: Take 1 tablet by mouth once daily     Last Filled:  4.9.24    Last appt: 1/10/2024   Next appt: 5/10/2024    Last OARRS:        No data to display

## 2024-05-11 DIAGNOSIS — E11.69 TYPE 2 DIABETES MELLITUS WITH OTHER SPECIFIED COMPLICATION, WITHOUT LONG-TERM CURRENT USE OF INSULIN (HCC): ICD-10-CM

## 2024-05-13 RX ORDER — PIOGLITAZONEHYDROCHLORIDE 30 MG/1
30 TABLET ORAL DAILY
Qty: 90 TABLET | Refills: 0 | Status: SHIPPED | OUTPATIENT
Start: 2024-05-13

## 2024-05-13 NOTE — TELEPHONE ENCOUNTER
Medication:   Requested Prescriptions     Pending Prescriptions Disp Refills    pioglitazone (ACTOS) 30 MG tablet [Pharmacy Med Name: Pioglitazone HCl 30 MG Oral Tablet] 90 tablet 0     Sig: Take 1 tablet by mouth once daily     Last Filled:  1.10.24    Last appt: 1/10/2024   Next appt: Visit date not found    Last Labs DM:   Lab Results   Component Value Date/Time    LABA1C 8.8 01/03/2024 10:32 AM

## 2024-05-14 DIAGNOSIS — E11.69 TYPE 2 DIABETES MELLITUS WITH OTHER SPECIFIED COMPLICATION, WITHOUT LONG-TERM CURRENT USE OF INSULIN (HCC): ICD-10-CM

## 2024-05-14 NOTE — TELEPHONE ENCOUNTER
Medication:   Requested Prescriptions     Pending Prescriptions Disp Refills    SITagliptin (JANUVIA) 100 MG tablet 90 tablet 0     Sig: Take 1 tablet by mouth daily     Last Filled:  2.16.24    Last appt: 1/10/2024   Next appt: Visit date not found    Last Labs DM:   Lab Results   Component Value Date/Time    LABA1C 8.8 01/03/2024 10:32 AM

## 2024-05-17 ENCOUNTER — OFFICE VISIT (OUTPATIENT)
Dept: PRIMARY CARE CLINIC | Age: 64
End: 2024-05-17
Payer: MEDICAID

## 2024-05-17 VITALS
OXYGEN SATURATION: 98 % | SYSTOLIC BLOOD PRESSURE: 142 MMHG | DIASTOLIC BLOOD PRESSURE: 90 MMHG | BODY MASS INDEX: 30.72 KG/M2 | HEIGHT: 72 IN | HEART RATE: 82 BPM

## 2024-05-17 DIAGNOSIS — Z79.01 CHRONIC ANTICOAGULATION: ICD-10-CM

## 2024-05-17 DIAGNOSIS — G47.33 OSA (OBSTRUCTIVE SLEEP APNEA): ICD-10-CM

## 2024-05-17 DIAGNOSIS — I10 PRIMARY HYPERTENSION: ICD-10-CM

## 2024-05-17 DIAGNOSIS — E11.69 TYPE 2 DIABETES MELLITUS WITH OTHER SPECIFIED COMPLICATION, WITHOUT LONG-TERM CURRENT USE OF INSULIN (HCC): Chronic | ICD-10-CM

## 2024-05-17 DIAGNOSIS — E11.69 TYPE 2 DIABETES MELLITUS WITH OTHER SPECIFIED COMPLICATION, WITHOUT LONG-TERM CURRENT USE OF INSULIN (HCC): Primary | Chronic | ICD-10-CM

## 2024-05-17 DIAGNOSIS — E78.9 LIPID DISORDER: ICD-10-CM

## 2024-05-17 DIAGNOSIS — G47.9 SLEEP DISORDER: ICD-10-CM

## 2024-05-17 LAB
ALBUMIN SERPL-MCNC: 4.7 G/DL (ref 3.4–5)
ALBUMIN/GLOB SERPL: 1.6 {RATIO} (ref 1.1–2.2)
ALP SERPL-CCNC: 66 U/L (ref 40–129)
ALT SERPL-CCNC: 36 U/L (ref 10–40)
ANION GAP SERPL CALCULATED.3IONS-SCNC: 14 MMOL/L (ref 3–16)
AST SERPL-CCNC: 24 U/L (ref 15–37)
BILIRUB SERPL-MCNC: 0.4 MG/DL (ref 0–1)
BUN SERPL-MCNC: 24 MG/DL (ref 7–20)
CALCIUM SERPL-MCNC: 10.1 MG/DL (ref 8.3–10.6)
CHLORIDE SERPL-SCNC: 99 MMOL/L (ref 99–110)
CO2 SERPL-SCNC: 26 MMOL/L (ref 21–32)
CREAT SERPL-MCNC: 1.2 MG/DL (ref 0.8–1.3)
GFR SERPLBLD CREATININE-BSD FMLA CKD-EPI: 67 ML/MIN/{1.73_M2}
GLUCOSE SERPL-MCNC: 145 MG/DL (ref 70–99)
POTASSIUM SERPL-SCNC: 4.7 MMOL/L (ref 3.5–5.1)
PROT SERPL-MCNC: 7.6 G/DL (ref 6.4–8.2)
SODIUM SERPL-SCNC: 139 MMOL/L (ref 136–145)

## 2024-05-17 PROCEDURE — 2022F DILAT RTA XM EVC RTNOPTHY: CPT | Performed by: FAMILY MEDICINE

## 2024-05-17 PROCEDURE — G8427 DOCREV CUR MEDS BY ELIG CLIN: HCPCS | Performed by: FAMILY MEDICINE

## 2024-05-17 PROCEDURE — 3079F DIAST BP 80-89 MM HG: CPT | Performed by: FAMILY MEDICINE

## 2024-05-17 PROCEDURE — 3077F SYST BP >= 140 MM HG: CPT | Performed by: FAMILY MEDICINE

## 2024-05-17 PROCEDURE — 3017F COLORECTAL CA SCREEN DOC REV: CPT | Performed by: FAMILY MEDICINE

## 2024-05-17 PROCEDURE — 3052F HG A1C>EQUAL 8.0%<EQUAL 9.0%: CPT | Performed by: FAMILY MEDICINE

## 2024-05-17 PROCEDURE — 1036F TOBACCO NON-USER: CPT | Performed by: FAMILY MEDICINE

## 2024-05-17 PROCEDURE — G8417 CALC BMI ABV UP PARAM F/U: HCPCS | Performed by: FAMILY MEDICINE

## 2024-05-17 PROCEDURE — 99214 OFFICE O/P EST MOD 30 MIN: CPT | Performed by: FAMILY MEDICINE

## 2024-05-17 RX ORDER — DOXEPIN HYDROCHLORIDE 50 MG/1
50 CAPSULE ORAL NIGHTLY
Qty: 30 CAPSULE | Refills: 1 | Status: SHIPPED | OUTPATIENT
Start: 2024-05-17 | End: 2024-07-16

## 2024-05-17 ASSESSMENT — ENCOUNTER SYMPTOMS
EYES NEGATIVE: 1
ABDOMINAL PAIN: 0
ROS SKIN COMMENTS: NO DERMATOLOGY
BACK PAIN: 0
APNEA: 1

## 2024-05-17 NOTE — PROGRESS NOTES
BILITOT 0.4 01/03/2024 1032        Hemoglobin A1C   Date Value Ref Range Status   01/03/2024 8.8 See comment % Final     Comment:     Comment:  Diagnosis of Diabetes: > or = 6.5%  Increased risk of diabetes (Prediabetes): 5.7-6.4%  Glycemic Control: Nonpregnant Adults: <7.0%                    Pregnant: <6.0%         Lab Results   Component Value Date    PSA 0.97 01/03/2024             Review of Systems   Constitutional:  Negative for chills, diaphoresis, fatigue and fever.        Pretty good  Active  Work around house  busy   HENT: Negative.     Eyes: Negative.         Eye check   Get [facility]   Retina Check    Respiratory:  Positive for apnea.         CPAP give it back  He couldn't wear mask  Quit tobacco year 2002   Cardiovascular:  Negative for chest pain, palpitations and leg swelling.        CQ 10    Gastrointestinal:  Negative for abdominal pain.        BM good  Colonoscopy 4/2023  Hx polyp x 3     Endocrine:        DM type II  Check daily  150-200   Genitourinary:  Negative for dysuria and flank pain.   Musculoskeletal:  Negative for back pain, gait problem and neck pain.        Hx Stroke  Lt UE weakness  Lt knee pain Ortho evaluation didn't go f/u   Skin:         No dermatology   Allergic/Immunologic: Positive for environmental allergies.   Neurological:  Negative for dizziness and headaches.        Hx Stroke  Almost recovered   Hematological: Negative.    Psychiatric/Behavioral:  Positive for sleep disturbance. Negative for behavioral problems, hallucinations, self-injury and suicidal ideas. The patient is not nervous/anxious and is not hyperactive.         Good Family support wife & son   Recovered alcohol since year 1987       OBJECTIVE:  BP (!) 150/88   Pulse 82   Ht 1.816 m (5' 11.5\")   SpO2 98%   BMI 30.72 kg/m²   Physical Exam  Constitutional:       Comments: BMI    HENT:      Head: Normocephalic and atraumatic.   Eyes:      Extraocular Movements: Extraocular movements intact.      Pupils: Pupils

## 2024-05-18 LAB
EST. AVERAGE GLUCOSE BLD GHB EST-MCNC: 168.6 MG/DL
HBA1C MFR BLD: 7.5 %

## 2024-07-28 DIAGNOSIS — E11.69 TYPE 2 DIABETES MELLITUS WITH OTHER SPECIFIED COMPLICATION, WITHOUT LONG-TERM CURRENT USE OF INSULIN (HCC): ICD-10-CM

## 2024-07-29 RX ORDER — PIOGLITAZONEHYDROCHLORIDE 30 MG/1
30 TABLET ORAL DAILY
Qty: 90 TABLET | Refills: 0 | Status: SHIPPED | OUTPATIENT
Start: 2024-07-29

## 2024-07-29 NOTE — TELEPHONE ENCOUNTER
Medication:   Requested Prescriptions     Pending Prescriptions Disp Refills    pioglitazone (ACTOS) 30 MG tablet [Pharmacy Med Name: Pioglitazone HCl 30 MG Oral Tablet] 90 tablet 0     Sig: Take 1 tablet by mouth once daily     Last Filled:  5.13.24    Last appt: 5/17/2024   Next appt: 8.5.24 with Dr. Graves  Last Labs DM:   Lab Results   Component Value Date/Time    LABA1C 7.5 05/17/2024 11:58 AM

## 2024-08-05 ENCOUNTER — OFFICE VISIT (OUTPATIENT)
Dept: PRIMARY CARE CLINIC | Age: 64
End: 2024-08-05
Payer: MEDICAID

## 2024-08-05 ENCOUNTER — TELEPHONE (OUTPATIENT)
Dept: FAMILY MEDICINE CLINIC | Age: 64
End: 2024-08-05

## 2024-08-05 VITALS
WEIGHT: 226 LBS | TEMPERATURE: 97.6 F | RESPIRATION RATE: 13 BRPM | SYSTOLIC BLOOD PRESSURE: 126 MMHG | HEART RATE: 77 BPM | BODY MASS INDEX: 31.08 KG/M2 | DIASTOLIC BLOOD PRESSURE: 86 MMHG | OXYGEN SATURATION: 98 %

## 2024-08-05 DIAGNOSIS — E66.9 OBESITY (BMI 30.0-34.9): ICD-10-CM

## 2024-08-05 DIAGNOSIS — E11.9 TYPE 2 DIABETES MELLITUS WITHOUT COMPLICATION, WITHOUT LONG-TERM CURRENT USE OF INSULIN (HCC): Chronic | ICD-10-CM

## 2024-08-05 DIAGNOSIS — E11.69 TYPE 2 DIABETES MELLITUS WITH OTHER SPECIFIED COMPLICATION, WITHOUT LONG-TERM CURRENT USE OF INSULIN (HCC): ICD-10-CM

## 2024-08-05 DIAGNOSIS — I10 PRIMARY HYPERTENSION: Primary | Chronic | ICD-10-CM

## 2024-08-05 DIAGNOSIS — I63.541 CEREBROVASCULAR ACCIDENT (CVA) DUE TO OCCLUSION OF RIGHT CEREBELLAR ARTERY (HCC): Chronic | ICD-10-CM

## 2024-08-05 DIAGNOSIS — G47.33 OBSTRUCTIVE SLEEP APNEA SYNDROME: ICD-10-CM

## 2024-08-05 DIAGNOSIS — E78.2 MIXED HYPERLIPIDEMIA: Chronic | ICD-10-CM

## 2024-08-05 LAB — HBA1C MFR BLD: 8 %

## 2024-08-05 PROCEDURE — 3017F COLORECTAL CA SCREEN DOC REV: CPT | Performed by: INTERNAL MEDICINE

## 2024-08-05 PROCEDURE — 1036F TOBACCO NON-USER: CPT | Performed by: INTERNAL MEDICINE

## 2024-08-05 PROCEDURE — 3051F HG A1C>EQUAL 7.0%<8.0%: CPT | Performed by: INTERNAL MEDICINE

## 2024-08-05 PROCEDURE — 83036 HEMOGLOBIN GLYCOSYLATED A1C: CPT | Performed by: INTERNAL MEDICINE

## 2024-08-05 PROCEDURE — G8427 DOCREV CUR MEDS BY ELIG CLIN: HCPCS | Performed by: INTERNAL MEDICINE

## 2024-08-05 PROCEDURE — 3079F DIAST BP 80-89 MM HG: CPT | Performed by: INTERNAL MEDICINE

## 2024-08-05 PROCEDURE — 99214 OFFICE O/P EST MOD 30 MIN: CPT | Performed by: INTERNAL MEDICINE

## 2024-08-05 PROCEDURE — G8417 CALC BMI ABV UP PARAM F/U: HCPCS | Performed by: INTERNAL MEDICINE

## 2024-08-05 PROCEDURE — 2022F DILAT RTA XM EVC RTNOPTHY: CPT | Performed by: INTERNAL MEDICINE

## 2024-08-05 PROCEDURE — 3074F SYST BP LT 130 MM HG: CPT | Performed by: INTERNAL MEDICINE

## 2024-08-05 RX ORDER — GLIPIZIDE 5 MG/1
TABLET ORAL
Qty: 180 TABLET | Refills: 3 | Status: SHIPPED | OUTPATIENT
Start: 2024-08-05

## 2024-08-05 RX ORDER — NIFEDIPINE 30 MG/1
30 TABLET, EXTENDED RELEASE ORAL DAILY
Qty: 90 TABLET | Refills: 0 | Status: SHIPPED | OUTPATIENT
Start: 2024-08-05

## 2024-08-05 RX ORDER — PIOGLITAZONEHYDROCHLORIDE 30 MG/1
30 TABLET ORAL DAILY
Qty: 90 TABLET | Refills: 1 | Status: SHIPPED | OUTPATIENT
Start: 2024-08-05

## 2024-08-05 NOTE — ASSESSMENT & PLAN NOTE
This has been a long standing problem, takes  lipitor      Monitors diet and tries to follow a low fat diet. Has  been reasonably  compliant w exercise. Lipids have been stable, The problem is controlled. Recent lipid tests were reviewed and are normal. Pertinent negatives include no chest pain, focal sensory loss, focal weakness, leg pain, myalgias or shortness of breath.  Advised patient to continue the current instructions or medications.

## 2024-08-05 NOTE — TELEPHONE ENCOUNTER
[facility] called about the meds. Semaglutide, 1 MG/DOSE, (OZEMPIC) 4 MG/3ML SOPN sc injection. They are saying that the pt is new to this med and need to be starting at a 0.2 mg. Please contact pharmacy for more info.  [facility] [address] [place] [address] [address] [place] [address] - P [phone] - F [phone]  [address] [place] [id number]  Phone: [phone]  Fax: [phone]

## 2024-08-05 NOTE — ASSESSMENT & PLAN NOTE
On asa and plavix,  Patient is compliant w medications, no side effects, effective, provides adequate symptom relief. No new symptoms or problems as noted by patient.  The problem is stable, no changes noted by patient. Will consider monitoring labs and refill medications as appropriate. Patient counseled and will continue current plan.

## 2024-08-05 NOTE — ASSESSMENT & PLAN NOTE
Diabetes Mellitus Type II:  Home blood sugar records reviewed: fasting range: 120-130 .  No significant episodes of hypoglycemia. Compliant with medications.  No polyuria, polydipsia, visual changes, foot problems, GI upset.  Diabetic diet compliance:  compliant most of the time.  Current exercise: none. Will check labs, and refill medications as appropriate.    Hypertension:  Denies CP, SOB, visual changes, dizziness, palpitations or HA.  He is adherent to a low sodium diet.  Blood pressure typically runs ok  outside of the office. Continue current medications.    Hyperlipidemia:  No new myalgias or GI upset on current medications.       Lab Results   Component Value Date    LABA1C 7.5 05/17/2024    LABA1C 8.8 01/03/2024    LABA1C 7.1 08/23/2023     Lab Results   Component Value Date    CREATININE 1.2 05/17/2024     Lab Results   Component Value Date    ALT 36 05/17/2024    AST 24 05/17/2024     No components found for: \"CHLPL\"  Lab Results   Component Value Date    TRIG 226 (H) 01/03/2024     Lab Results   Component Value Date    HDL 33 (L) 01/03/2024     No results found for: \"LDLDIRECT\"   This problem was reviewed in detail. It is under control and stable. Will check blood work.

## 2024-08-05 NOTE — PROGRESS NOTES
current instructions or medications.       Obesity (BMI 30.0-34.9)  Patient counseled,   Encouraged to lose weight, watch diet and exercise consistently.       Obstructive sleep apnea syndrome  Wears CPAP,  Stable.      Cerebrovascular accident (CVA) due to occlusion of right cerebellar artery (HCC)  On asa and plavix,  Patient is compliant w medications, no side effects, effective, provides adequate symptom relief. No new symptoms or problems as noted by patient.  The problem is stable, no changes noted by patient. Will consider monitoring labs and refill medications as appropriate. Patient counseled and will continue current plan.      Mixed hyperlipidemia  This has been a long standing problem, takes  lipitor      Monitors diet and tries to follow a low fat diet. Has  been reasonably  compliant w exercise. Lipids have been stable, The problem is controlled. Recent lipid tests were reviewed and are normal. Pertinent negatives include no chest pain, focal sensory loss, focal weakness, leg pain, myalgias or shortness of breath.  Advised patient to continue the current instructions or medications.

## 2024-08-05 NOTE — TELEPHONE ENCOUNTER
Medication:   Requested Prescriptions     Pending Prescriptions Disp Refills    NIFEdipine (PROCARDIA XL) 30 MG extended release tablet 90 tablet 0     Sig: Take 1 tablet by mouth daily     Last Filled:  5.6.24    Last appt: 8/5/2024   Next appt: Visit date not found    Last OARRS:        No data to display

## 2024-08-06 ENCOUNTER — TELEPHONE (OUTPATIENT)
Dept: FAMILY MEDICINE CLINIC | Age: 64
End: 2024-08-06

## 2024-08-06 NOTE — TELEPHONE ENCOUNTER
SUBMITTED PA FOR Ozempic (1 MG/DOSE) 4MG/3ML pen-injectors VIA CMM Key: [id number] STATUS PENDING.      FOLLOW UP DONE DAILY: IF NO RESPONSE IN 3 DAYS WE WILL REFAX FOR STATUS CHECK. IF ANOTHER 3 DAYS GOES BY WITH NO RESPONSE WILL CALL INSURANCE FOR STATUS.

## 2024-08-08 NOTE — TELEPHONE ENCOUNTER
We need to do PA on this. He cannot tolerated metformin, already on a couple of drugs for diabetes.

## 2024-08-08 NOTE — TELEPHONE ENCOUNTER
DENIAL for Ozempic (1 MG/DOSE) 4MG/3ML pen-injectors; letter attached.    If this requires a response please respond to the pool ( P MHCX PSC MEDICATION PRE-AUTH).      Thank you please advise patient.

## 2024-08-08 NOTE — TELEPHONE ENCOUNTER
According to the denial letter, patient must fail one of these 3 drugs: Byetta, Victoza or Trulicity.  I don't see any of these drugs in the chart records.  If patient has failed one of these, please let me know which one and when he took it, then I can proceed with an appeal.    Please respond to the pool ( P MHCX PSC MEDICATION PRE-AUTH).      Thank you!

## 2024-08-08 NOTE — TELEPHONE ENCOUNTER
Provider would like to do an appeal, per provider below.   We need to do PA on this. He cannot tolerated metformin, already on a couple of drugs for diabetes.

## 2024-09-30 RX ORDER — CLOPIDOGREL BISULFATE 75 MG/1
TABLET ORAL
Qty: 90 TABLET | Refills: 1 | Status: SHIPPED | OUTPATIENT
Start: 2024-09-30

## 2024-09-30 NOTE — TELEPHONE ENCOUNTER
Medication:   Requested Prescriptions     Pending Prescriptions Disp Refills    clopidogrel (PLAVIX) 75 MG tablet [Pharmacy Med Name: Clopidogrel Bisulfate 75 MG Oral Tablet] 30 tablet 0     Sig: Take 1 tablet by mouth once daily     Last Filled:  1.17.24    Last appt: 8/5/2024   Next appt: 2/5/2025    Last OARRS:        No data to display

## 2024-10-28 RX ORDER — ATORVASTATIN CALCIUM 80 MG/1
TABLET, FILM COATED ORAL
Qty: 90 TABLET | Refills: 0 | Status: SHIPPED | OUTPATIENT
Start: 2024-10-28

## 2024-10-28 NOTE — TELEPHONE ENCOUNTER
Medication:   Requested Prescriptions     Pending Prescriptions Disp Refills    atorvastatin (LIPITOR) 80 MG tablet [Pharmacy Med Name: Atorvastatin Calcium 80 MG Oral Tablet] 90 tablet 0     Sig: Take 1 tablet by mouth once daily     Last Filled:  5.3.24    Last appt: 8/5/2024   Next appt: 2/5/2025    Last Lipid:   Lab Results   Component Value Date/Time    CHOL 163 01/03/2024 10:32 AM    TRIG 226 01/03/2024 10:32 AM    HDL 33 01/03/2024 10:32 AM

## 2024-10-31 RX ORDER — NIFEDIPINE 30 MG/1
30 TABLET, EXTENDED RELEASE ORAL DAILY
Qty: 90 TABLET | Refills: 0 | Status: SHIPPED | OUTPATIENT
Start: 2024-10-31

## 2024-10-31 NOTE — TELEPHONE ENCOUNTER
Medication:   Requested Prescriptions     Pending Prescriptions Disp Refills    NIFEdipine (PROCARDIA XL) 30 MG extended release tablet [Pharmacy Med Name: NIFEdipine ER Osmotic Release 30 MG Oral Tablet Extended Release 24 Hour] 90 tablet 0     Sig: Take 1 tablet by mouth once daily     Last Filled:  8/5/2024    Last appt: 8/5/2024   Next appt: 2/5/2025    Last OARRS:        No data to display

## 2024-11-03 DIAGNOSIS — I10 PRIMARY HYPERTENSION: ICD-10-CM

## 2024-11-04 RX ORDER — LISINOPRIL 40 MG/1
TABLET ORAL
Qty: 30 TABLET | Refills: 5 | Status: SHIPPED | OUTPATIENT
Start: 2024-11-04

## 2024-11-04 NOTE — TELEPHONE ENCOUNTER
Medication:   Requested Prescriptions     Pending Prescriptions Disp Refills    lisinopril (PRINIVIL;ZESTRIL) 40 MG tablet [Pharmacy Med Name: Lisinopril 40 MG Oral Tablet] 30 tablet 0     Sig: Take 1 tablet by mouth once daily     Last Filled:  1.25.24    Last appt: 8/5/2024   Next appt: 2/5/2025    Last OARRS:        No data to display

## 2024-11-18 RX ORDER — DULAGLUTIDE 0.75 MG/.5ML
INJECTION, SOLUTION SUBCUTANEOUS
Qty: 4 ML | Refills: 0 | Status: SHIPPED | OUTPATIENT
Start: 2024-11-18

## 2024-11-18 NOTE — TELEPHONE ENCOUNTER
Medication:   Requested Prescriptions     Pending Prescriptions Disp Refills    TRULICITY 0.75 MG/0.5ML SOAJ SC injection [Pharmacy Med Name: Trulicity 0.75 MG/0.5ML Subcutaneous Solution Pen-injector] 4 mL 0     Sig: INJECT 1/2 (ONE-HALF) ML SUBCUTANEOUSLY  ONCE A WEEK     Last Filled:  08/08/2024    Last appt: 8/5/2024   Next appt: 2/5/2025    Last OARRS:        No data to display

## 2025-01-27 RX ORDER — NIFEDIPINE 30 MG/1
30 TABLET, EXTENDED RELEASE ORAL DAILY
Qty: 90 TABLET | Refills: 0 | Status: SHIPPED | OUTPATIENT
Start: 2025-01-27

## 2025-01-27 NOTE — TELEPHONE ENCOUNTER
Medication:   Requested Prescriptions     Pending Prescriptions Disp Refills    NIFEdipine (PROCARDIA XL) 30 MG extended release tablet [Pharmacy Med Name: NIFEdipine ER Osmotic Release 30 MG Oral Tablet Extended Release 24 Hour] 90 tablet 0     Sig: Take 1 tablet by mouth once daily     Last Filled:  10.31.24    Last appt: 8/5/2024   Next appt: 2/5/2025    Last OARRS:        No data to display

## 2025-02-05 ENCOUNTER — OFFICE VISIT (OUTPATIENT)
Dept: PRIMARY CARE CLINIC | Age: 65
End: 2025-02-05
Payer: MEDICARE

## 2025-02-05 VITALS
HEART RATE: 88 BPM | WEIGHT: 231 LBS | DIASTOLIC BLOOD PRESSURE: 86 MMHG | SYSTOLIC BLOOD PRESSURE: 158 MMHG | OXYGEN SATURATION: 98 % | BODY MASS INDEX: 31.77 KG/M2 | TEMPERATURE: 97.6 F

## 2025-02-05 DIAGNOSIS — I10 PRIMARY HYPERTENSION: ICD-10-CM

## 2025-02-05 DIAGNOSIS — I63.541 CEREBROVASCULAR ACCIDENT (CVA) DUE TO OCCLUSION OF RIGHT CEREBELLAR ARTERY (HCC): ICD-10-CM

## 2025-02-05 DIAGNOSIS — E66.811 OBESITY (BMI 30.0-34.9): ICD-10-CM

## 2025-02-05 DIAGNOSIS — Z12.5 SCREENING FOR PROSTATE CANCER: ICD-10-CM

## 2025-02-05 DIAGNOSIS — G47.33 OBSTRUCTIVE SLEEP APNEA SYNDROME: ICD-10-CM

## 2025-02-05 DIAGNOSIS — E11.9 TYPE 2 DIABETES MELLITUS WITHOUT COMPLICATION, WITHOUT LONG-TERM CURRENT USE OF INSULIN (HCC): Primary | ICD-10-CM

## 2025-02-05 DIAGNOSIS — E78.2 MIXED HYPERLIPIDEMIA: ICD-10-CM

## 2025-02-05 DIAGNOSIS — N18.31 STAGE 3A CHRONIC KIDNEY DISEASE (HCC): ICD-10-CM

## 2025-02-05 DIAGNOSIS — E11.9 TYPE 2 DIABETES MELLITUS WITHOUT COMPLICATION, WITHOUT LONG-TERM CURRENT USE OF INSULIN (HCC): ICD-10-CM

## 2025-02-05 LAB
ALBUMIN SERPL-MCNC: 4.7 G/DL (ref 3.4–5)
ALBUMIN/GLOB SERPL: 1.6 {RATIO} (ref 1.1–2.2)
ALP SERPL-CCNC: 60 U/L (ref 40–129)
ALT SERPL-CCNC: 29 U/L (ref 10–40)
ANION GAP SERPL CALCULATED.3IONS-SCNC: 9 MMOL/L (ref 3–16)
AST SERPL-CCNC: 21 U/L (ref 15–37)
BASOPHILS # BLD: 0 K/UL (ref 0–0.2)
BASOPHILS NFR BLD: 0.9 %
BILIRUB SERPL-MCNC: 0.3 MG/DL (ref 0–1)
BUN SERPL-MCNC: 23 MG/DL (ref 7–20)
CALCIUM SERPL-MCNC: 9.9 MG/DL (ref 8.3–10.6)
CHLORIDE SERPL-SCNC: 99 MMOL/L (ref 99–110)
CHOLEST SERPL-MCNC: 302 MG/DL (ref 0–199)
CO2 SERPL-SCNC: 27 MMOL/L (ref 21–32)
CREAT SERPL-MCNC: 1.2 MG/DL (ref 0.8–1.3)
CREAT UR-MCNC: 132 MG/DL (ref 39–259)
DEPRECATED RDW RBC AUTO: 13.8 % (ref 12.4–15.4)
EOSINOPHIL # BLD: 0.2 K/UL (ref 0–0.6)
EOSINOPHIL NFR BLD: 4.7 %
EST. AVERAGE GLUCOSE BLD GHB EST-MCNC: 145.6 MG/DL
GFR SERPLBLD CREATININE-BSD FMLA CKD-EPI: 67 ML/MIN/{1.73_M2}
GLUCOSE SERPL-MCNC: 122 MG/DL (ref 70–99)
HBA1C MFR BLD: 6.7 %
HCT VFR BLD AUTO: 39.3 % (ref 40.5–52.5)
HDLC SERPL-MCNC: 35 MG/DL (ref 40–60)
HGB BLD-MCNC: 13.1 G/DL (ref 13.5–17.5)
LDLC SERPL CALC-MCNC: ABNORMAL MG/DL
LDLC SERPL-MCNC: 184 MG/DL
LYMPHOCYTES # BLD: 1.4 K/UL (ref 1–5.1)
LYMPHOCYTES NFR BLD: 26.5 %
MCH RBC QN AUTO: 30.7 PG (ref 26–34)
MCHC RBC AUTO-ENTMCNC: 33.5 G/DL (ref 31–36)
MCV RBC AUTO: 91.8 FL (ref 80–100)
MICROALBUMIN UR DL<=1MG/L-MCNC: 4.1 MG/DL
MICROALBUMIN/CREAT UR: 31.1 MG/G (ref 0–30)
MONOCYTES # BLD: 0.5 K/UL (ref 0–1.3)
MONOCYTES NFR BLD: 9.4 %
NEUTROPHILS # BLD: 3 K/UL (ref 1.7–7.7)
NEUTROPHILS NFR BLD: 58.5 %
PLATELET # BLD AUTO: 202 K/UL (ref 135–450)
PMV BLD AUTO: 9.5 FL (ref 5–10.5)
POTASSIUM SERPL-SCNC: 4.7 MMOL/L (ref 3.5–5.1)
PROT SERPL-MCNC: 7.7 G/DL (ref 6.4–8.2)
PSA SERPL DL<=0.01 NG/ML-MCNC: 1.28 NG/ML (ref 0–4)
RBC # BLD AUTO: 4.28 M/UL (ref 4.2–5.9)
SODIUM SERPL-SCNC: 135 MMOL/L (ref 136–145)
TRIGL SERPL-MCNC: 328 MG/DL (ref 0–150)
TSH SERPL DL<=0.005 MIU/L-ACNC: 2.65 UIU/ML (ref 0.27–4.2)
VLDLC SERPL CALC-MCNC: ABNORMAL MG/DL
WBC # BLD AUTO: 5.2 K/UL (ref 4–11)

## 2025-02-05 PROCEDURE — 1123F ACP DISCUSS/DSCN MKR DOCD: CPT | Performed by: INTERNAL MEDICINE

## 2025-02-05 PROCEDURE — 3079F DIAST BP 80-89 MM HG: CPT | Performed by: INTERNAL MEDICINE

## 2025-02-05 PROCEDURE — 3077F SYST BP >= 140 MM HG: CPT | Performed by: INTERNAL MEDICINE

## 2025-02-05 PROCEDURE — 99214 OFFICE O/P EST MOD 30 MIN: CPT | Performed by: INTERNAL MEDICINE

## 2025-02-05 SDOH — ECONOMIC STABILITY: FOOD INSECURITY: WITHIN THE PAST 12 MONTHS, YOU WORRIED THAT YOUR FOOD WOULD RUN OUT BEFORE YOU GOT MONEY TO BUY MORE.: NEVER TRUE

## 2025-02-05 SDOH — ECONOMIC STABILITY: FOOD INSECURITY: WITHIN THE PAST 12 MONTHS, THE FOOD YOU BOUGHT JUST DIDN'T LAST AND YOU DIDN'T HAVE MONEY TO GET MORE.: NEVER TRUE

## 2025-02-05 ASSESSMENT — PATIENT HEALTH QUESTIONNAIRE - PHQ9
1. LITTLE INTEREST OR PLEASURE IN DOING THINGS: NOT AT ALL
SUM OF ALL RESPONSES TO PHQ QUESTIONS 1-9: 0
SUM OF ALL RESPONSES TO PHQ9 QUESTIONS 1 & 2: 0
SUM OF ALL RESPONSES TO PHQ QUESTIONS 1-9: 0
2. FEELING DOWN, DEPRESSED OR HOPELESS: NOT AT ALL

## 2025-02-05 NOTE — PROGRESS NOTES
murmurs, clicks, gallops or rubs. Abdomen is soft, no tenderness, masses or organomegaly. Bowel sounds are normally heard.Pelvis: normal. Extremities are normal. Peripheral pulses are normal. Screening neurological exam is normal without focal findings. Cranial nerves are intact, reflexes are symmetrical and muscle strength eaqual. Skin is normal without suspicious lesions noted.         ASSESSMENT / PLAN-  Type 2 diabetes mellitus without complication, without long-term current use of insulin ([facility])  Diabetes Mellitus Type II:  Home blood sugar records reviewed: fasting range: 120-130 .  No significant episodes of hypoglycemia. Compliant with medications.  No polyuria, polydipsia, visual changes, foot problems, GI upset.  Diabetic diet compliance:  compliant most of the time.  Current exercise: none. Will check labs, and refill medications as appropriate.     Hypertension:  Denies CP, SOB, visual changes, dizziness, palpitations or HA.  He is adherent to a low sodium diet.  Blood pressure typically runs ok  outside of the office. Continue current medications.     Hyperlipidemia:  No new myalgias or GI upset on current medications.              Lab Results   Component Value Date     LABA1C 7.5 05/17/2024     LABA1C 8.8 01/03/2024     LABA1C 7.1 08/23/2023            Lab Results   Component Value Date     CREATININE 1.2 05/17/2024            Lab Results   Component Value Date     ALT 36 05/17/2024     AST 24 05/17/2024      No components found for: \"CHLPL\"        Lab Results   Component Value Date     TRIG 226 (H) 01/03/2024            Lab Results   Component Value Date     HDL 33 (L) 01/03/2024      No results found for: \"LDLDIRECT\"   This problem was reviewed in detail. It is under control and stable. Will check blood work.        Primary hypertension  This is a chronic problem. The problem is well controlled.  Patient monitors readings regularly. Pertinent negatives include no chest pain, focal sensory loss, focal

## 2025-03-03 RX ORDER — ATORVASTATIN CALCIUM 80 MG/1
TABLET, FILM COATED ORAL
Qty: 90 TABLET | Refills: 1 | Status: SHIPPED | OUTPATIENT
Start: 2025-03-03

## 2025-03-03 NOTE — TELEPHONE ENCOUNTER
Medication:   Requested Prescriptions     Pending Prescriptions Disp Refills    atorvastatin (LIPITOR) 80 MG tablet [Pharmacy Med Name: Atorvastatin Calcium 80 MG Oral Tablet] 90 tablet 0     Sig: Take 1 tablet by mouth once daily     Last Filled:  10/28/2024    Last appt: 2/5/2025   Next appt: Visit date not found    Last Lipid:   Lab Results   Component Value Date/Time    CHOL 302 02/05/2025 10:53 AM    TRIG 328 02/05/2025 10:53 AM    HDL 35 02/05/2025 10:53 AM

## 2025-03-31 RX ORDER — CLOPIDOGREL BISULFATE 75 MG/1
75 TABLET ORAL DAILY
Qty: 90 TABLET | Refills: 1 | Status: SHIPPED | OUTPATIENT
Start: 2025-03-31

## 2025-03-31 NOTE — TELEPHONE ENCOUNTER
Medication:   Requested Prescriptions     Pending Prescriptions Disp Refills    clopidogrel (PLAVIX) 75 MG tablet 90 tablet 1     Sig: Take 1 tablet by mouth daily     Last Filled:  9.30.24    Last appt: 2/5/2025   Next appt: Visit date not found    Last OARRS:        No data to display

## 2025-04-23 RX ORDER — NIFEDIPINE 30 MG/1
TABLET, EXTENDED RELEASE ORAL DAILY
Qty: 90 TABLET | Refills: 1 | Status: SHIPPED | OUTPATIENT
Start: 2025-04-23

## 2025-04-23 NOTE — TELEPHONE ENCOUNTER
Medication:   Requested Prescriptions     Pending Prescriptions Disp Refills    NIFEdipine (PROCARDIA XL) 30 MG extended release tablet [Pharmacy Med Name: NIFEdipine ER Osmotic Release 30 MG Oral Tablet Extended Release 24 Hour]  0     Sig: Take 1 tablet by mouth once daily     Last Filled:  1.27.25    Last appt: 2/5/2025   Next appt: Visit date not found    Last OARRS:        No data to display

## 2025-04-29 DIAGNOSIS — I10 PRIMARY HYPERTENSION: ICD-10-CM

## 2025-04-29 RX ORDER — LISINOPRIL 40 MG/1
40 TABLET ORAL DAILY
Qty: 30 TABLET | Refills: 3 | Status: SHIPPED | OUTPATIENT
Start: 2025-04-29

## 2025-04-29 NOTE — TELEPHONE ENCOUNTER
Medication:   Requested Prescriptions     Pending Prescriptions Disp Refills    lisinopril (PRINIVIL;ZESTRIL) 40 MG tablet [Pharmacy Med Name: Lisinopril 40 MG Oral Tablet] 30 tablet 0     Sig: Take 1 tablet by mouth once daily     Last Filled:  11/04/2024    Last appt: 2/5/2025   Next appt: Visit date not found    Last OARRS:        No data to display

## 2025-05-05 DIAGNOSIS — E11.69 TYPE 2 DIABETES MELLITUS WITH OTHER SPECIFIED COMPLICATION, WITHOUT LONG-TERM CURRENT USE OF INSULIN (HCC): ICD-10-CM

## 2025-05-05 RX ORDER — PIOGLITAZONE 30 MG/1
30 TABLET ORAL DAILY
Qty: 90 TABLET | Refills: 1 | Status: SHIPPED | OUTPATIENT
Start: 2025-05-05

## 2025-05-05 NOTE — TELEPHONE ENCOUNTER
Medication:   Requested Prescriptions     Pending Prescriptions Disp Refills    pioglitazone (ACTOS) 30 MG tablet [Pharmacy Med Name: PIOGLITAZONE 30MG   TAB] 90 tablet 0     Sig: Take 1 tablet by mouth once daily     Last Filled:  08/05/2024    Last appt: 2/5/2025   Next appt: Visit date not found    Last Labs DM:   Lab Results   Component Value Date/Time    LABA1C 6.7 02/05/2025 10:53 AM

## 2025-07-08 NOTE — TELEPHONE ENCOUNTER
Medication:   Requested Prescriptions     Pending Prescriptions Disp Refills    metFORMIN (GLUCOPHAGE) 1000 MG tablet [Pharmacy Med Name: metFORMIN HCl 1000 MG Oral Tablet] 180 tablet 0     Sig: TAKE 1 TABLET BY MOUTH TWICE DAILY WITH MEALS     Last Filled:  02/05/2025    Last appt: 2/5/2025   Next appt: Visit date not found    Last Labs DM:   Lab Results   Component Value Date/Time    LABA1C 6.7 02/05/2025 10:53 AM     
No

## 2025-08-15 ENCOUNTER — TELEPHONE (OUTPATIENT)
Dept: PRIMARY CARE CLINIC | Age: 65
End: 2025-08-15

## 2025-08-15 RX ORDER — ERGOCALCIFEROL 1.25 MG/1
50000 CAPSULE, LIQUID FILLED ORAL WEEKLY
Qty: 12 CAPSULE | Refills: 1 | Status: SHIPPED | OUTPATIENT
Start: 2025-08-15

## 2025-08-28 RX ORDER — ATORVASTATIN CALCIUM 80 MG/1
80 TABLET, FILM COATED ORAL DAILY
Qty: 90 TABLET | Refills: 3 | Status: SHIPPED | OUTPATIENT
Start: 2025-08-28

## (undated) DEVICE — TRAP SPEC RETRV CLR PLAS POLYP IN LN SUCT QUIK CTCH

## (undated) DEVICE — SNARE COLD DIAMOND 10MM THIN

## (undated) DEVICE — CLIP LIG L235CM RESOL 360 BX/20

## (undated) DEVICE — CANNULA SAMP CO2 AD GRN 7FT CO2 AND 7FT O2 TBNG UNIV CONN

## (undated) DEVICE — SINGLE-USE POLYPECTOMY SNARE: Brand: CAPTIVATOR

## (undated) DEVICE — ERBE NESSY®PLATE 170 SPLIT; 168CM²; CABLE 3M: Brand: ERBE